# Patient Record
Sex: MALE | Race: WHITE | Employment: OTHER | URBAN - METROPOLITAN AREA
[De-identification: names, ages, dates, MRNs, and addresses within clinical notes are randomized per-mention and may not be internally consistent; named-entity substitution may affect disease eponyms.]

---

## 2016-05-04 PROBLEM — E11.3391: Noted: 2018-05-24

## 2017-03-14 RX ORDER — TAMSULOSIN HYDROCHLORIDE 0.4 MG/1
CAPSULE ORAL
Qty: 30 CAP | Refills: 3 | Status: SHIPPED | OUTPATIENT
Start: 2017-03-14 | End: 2017-07-15 | Stop reason: SDUPTHER

## 2017-04-26 ENCOUNTER — HOSPITAL ENCOUNTER (OUTPATIENT)
Dept: LAB | Age: 67
Discharge: HOME | End: 2017-04-26
Payer: COMMERCIAL

## 2017-04-26 ENCOUNTER — OFFICE VISIT (OUTPATIENT)
Dept: UROLOGY | Age: 67
End: 2017-04-26

## 2017-04-26 VITALS — BODY MASS INDEX: 29.2 KG/M2 | HEIGHT: 70 IN | WEIGHT: 204 LBS

## 2017-04-26 DIAGNOSIS — N40.1 BPH ASSOCIATED WITH NOCTURIA: ICD-10-CM

## 2017-04-26 DIAGNOSIS — R35.1 BPH ASSOCIATED WITH NOCTURIA: ICD-10-CM

## 2017-04-26 DIAGNOSIS — N40.0 BENIGN PROSTATIC HYPERPLASIA, PRESENCE OF LOWER URINARY TRACT SYMPTOMS UNSPECIFIED, UNSPECIFIED MORPHOLOGY: Primary | ICD-10-CM

## 2017-04-26 LAB
BILIRUB UR QL STRIP: NEGATIVE
GLUCOSE UR-MCNC: NEGATIVE MG/DL
KETONES P FAST UR STRIP-MCNC: NEGATIVE MG/DL
PH UR STRIP: 5.5 [PH] (ref 4.6–8)
PROT UR QL STRIP: NEGATIVE MG/DL
PSA SERPL-MCNC: 0.8 NG/ML (ref 0–4)
SP GR UR STRIP: 1.02 (ref 1–1.03)
UA UROBILINOGEN AMB POC: NORMAL (ref 0.2–1)
URINALYSIS CLARITY POC: CLEAR
URINALYSIS COLOR POC: YELLOW
URINE BLOOD POC: NEGATIVE
URINE LEUKOCYTES POC: NORMAL
URINE NITRITES POC: NEGATIVE

## 2017-04-26 PROCEDURE — 84153 ASSAY OF PSA TOTAL: CPT | Performed by: UROLOGY

## 2017-04-26 RX ORDER — LORATADINE 10 MG/1
10 TABLET ORAL
COMMUNITY

## 2017-04-26 RX ORDER — NAPROXEN SODIUM 220 MG
220 TABLET ORAL 2 TIMES DAILY WITH MEALS
COMMUNITY
End: 2017-05-22

## 2017-04-26 NOTE — PROGRESS NOTES
RBV. Per Dr. Frances Loera lab drawn in office today for PSA for BPH. Mr. Reginal Sacks has a reminder for a \"due or due soon\" health maintenance. I have asked that he contact his primary care provider for follow-up on this health maintenance.

## 2017-04-26 NOTE — PROGRESS NOTES
Mr. Daysi Samano has a reminder for a \"due or due soon\" health maintenance. I have asked that he contact his primary care provider for follow-up on this health maintenance.

## 2017-04-26 NOTE — MR AVS SNAPSHOT
Visit Information Date & Time Provider Department Dept. Phone Encounter #  
 4/26/2017 10:45 AM Narciso Bartholomew, [address] [facility] [id number] Upcoming Health Maintenance Date Due Hepatitis C Screening 1950 DTaP/Tdap/Td series (1 - Tdap) 6/3/1972 ZOSTER VACCINE AGE 60> 6/3/2011 MEDICARE YEARLY EXAM 6/3/2016 EYE EXAM RETINAL OR DILATED Q1 5/4/2017 HEMOGLOBIN A1C Q6M 5/16/2017 MICROALBUMIN Q1 7/6/2017 Pneumococcal 65+ Low/Medium Risk (2 of 2 - PPSV23) 11/16/2017 FOOT EXAM Q1 11/16/2017 LIPID PANEL Q1 11/16/2017 GLAUCOMA SCREENING Q2Y 5/4/2018 COLONOSCOPY 7/1/2023 Allergies as of 4/26/2017  Review Complete On: 4/26/2017 By: William Jacob LPN Severity Noted Reaction Type Reactions Neurontin [Gabapentin]  08/18/2015    Other (comments)  
 confusion Topamax [Topiramate]  08/18/2015    Other (comments)  
 confusion Current Immunizations  Reviewed on 11/17/2015 Name Date Influenza High Dose Vaccine PF 10/30/2016 Influenza Vaccine PF 11/17/2015 Pneumococcal Conjugate (PCV-13) 11/16/2016 Not reviewed this visit You Were Diagnosed With   
  
 Codes Comments Benign prostatic hyperplasia, presence of lower urinary tract symptoms unspecified, unspecified morphology    -  Primary ICD-10-CM: N40.0 ICD-9-CM: 600.00   
 BPH associated with nocturia     ICD-10-CM: N40.1, R35.1 ICD-9-CM: 600.01, 788.43 Vitals Height(growth percentile) Weight(growth percentile) BMI Smoking Status 5' 10\" (1.778 m) 204 lb (92.5 kg) 29.27 kg/m2 Former Smoker Vitals History BMI and BSA Data Body Mass Index Body Surface Area  
 29.27 kg/m 2 2.14 m 2 Preferred Pharmacy Pharmacy Name Phone [id number], [address] [facility] [phone] Your Updated Medication List  
  
   
 This list is accurate as of: 4/26/17 11:52 AM.  Always use your most recent med list. ALLEGRA 180 mg tablet Generic drug:  fexofenadine Take  by mouth daily. atorvastatin 40 mg tablet Commonly known as:  LIPITOR Take 1 Tab by mouth daily. CENTRUM SILVER PO Take  by mouth.  
  
 citalopram 20 mg tablet Commonly known as:  Sol Jayden Take 1 Tab by mouth daily. esomeprazole 40 mg capsule Commonly known as:  Lorrie Cull Take 1 Cap by mouth daily. fenofibrate nanocrystallized 145 mg tablet Commonly known as:  TRICOR  
1 qd  
  
 finasteride 5 mg tablet Commonly known as:  PROSCAR  
take 1 tablet by mouth once daily FLEXERIL 10 mg tablet Generic drug:  cyclobenzaprine Take  by mouth three (3) times daily as needed for Muscle Spasm(s). gabapentin 600 mg tablet Commonly known as:  NEURONTIN Take 1 Tab by mouth three (3) times daily. HYDROcodone-acetaminophen  mg tablet Commonly known as:  Mukesh Gallon Take 1 Tab by mouth.  
  
 loratadine 10 mg tablet Commonly known as:  Manual Me Take 10 mg by mouth. naproxen sodium 220 mg tablet Commonly known as:  NAPROSYN Take 220 mg by mouth two (2) times daily (with meals). pioglitazone 30 mg tablet Commonly known as:  ACTOS  
1 qd  
  
 quinapril 20 mg tablet Commonly known as:  ACCUPRIL Take 1 Tab by mouth nightly. tamsulosin 0.4 mg capsule Commonly known as:  FLOMAX  
take 1 capsule by mouth once daily  
  
 zolpidem CR 12.5 mg tablet Commonly known as:  AMBIEN CR Take 12.5 mg by mouth nightly as needed for Sleep. We Performed the Following AMB POC URINALYSIS DIP STICK AUTO W/O MICRO [81560 CPT(R)] [place] COLLECTION VENOUS BLOOD,VENIPUNCTURE B7032574 CPT(R)] To-Do List   
 04/26/2017 Lab:  PROSTATE SPECIFIC AG (PSA) Patient Instructions Benign Prostatic Hyperplasia: Care Instructions Your Care Instructions Benign prostatic hyperplasia, or BPH, is an enlarged prostate gland. The prostate is a small gland that makes some of the fluid in semen. Prostate enlargement happens to almost all men as they age. It is usually not serious. BPH does not cause prostate cancer. As the prostate gets bigger, it may partly block the flow of urine. You may have a hard time getting a urine stream started or completely stopped. BPH can cause dribbling. You may have a weak urine stream, or you may have to urinate more often than you used to, especially at night. Most men find these problems easy to manage. You do not need treatment unless your symptoms bother you a lot or you have other problems, such as bladder infections or stones. In these cases, medicines may help. Surgery is not needed unless the urine flow is blocked or the symptoms do not get better with medicine. Follow-up care is a key part of your treatment and safety. Be sure to make and go to all appointments, and call your doctor if you are having problems. It's also a good idea to know your test results and keep a list of the medicines you take. How can you care for yourself at home? · Take plenty of time to urinate. Try to relax. · Try \"double voiding. \" Urinate as much you can, relax for a few moments, and then try to urinate again. · Sit on the toilet to urinate. · Read or think of other things while you are waiting. · Turn on a faucet, or try to picture running water. Some men find that this helps get their urine flowing. · If dribbling is a problem, wash your penis daily to avoid skin irritation and infection. · Avoid caffeine and alcohol. These drinks will increase how often you need to urinate. Spread your fluid intake throughout the day. If the urge to urinate often wakes you at night, limit your fluid intake in the evening. Urinate right before you go to bed.  
· Many over-the-counter cold and allergy medicines can make the symptoms of BPH worse. Avoid antihistamines, decongestants, and allergy pills, if you can. Read the warnings on the package. · If you take any prescription medicines, especially tranquilizers or antidepressants, ask your doctor or pharmacist whether they can cause urination problems. There may be other medicines you can use that do not cause urinary problems. · Be safe with medicines. Take your medicines exactly as prescribed. Call your doctor if you think you are having a problem with your medicine. When should you call for help? Call your doctor now or seek immediate medical care if: 
· You cannot urinate at all. · You have symptoms of a urinary infection. For example: ¨ You have blood or pus in your urine. ¨ You have pain in your back just below your rib cage. This is called flank pain. ¨ You have a fever, chills, or body aches. ¨ It hurts to urinate. ¨ You have groin or belly pain. Watch closely for changes in your health, and be sure to contact your doctor if: 
· It hurts when you ejaculate. · Your urinary problems get a lot worse or bother you a lot. Where can you learn more? Go to http://jhonatan-mayda.info/. Enter S135 in the search box to learn more about \"Benign Prostatic Hyperplasia: Care Instructions. \" Current as of: May 24, 2016 Content Version: 11.2 © 9430-9009 [employer]. Care instructions adapted under license by [employer] (which disclaims liability or warranty for this information). If you have questions about a medical condition or this instruction, always ask your healthcare professional. [place] [address] any warranty or liability for your use of this information. Introducing [facility]! Rianna Sheehan introduces [employer] patient portal. Now you can access parts of your medical record, email your doctor's office, and request medication refills online.    
 
1. In your internet browser, go to [email or web address] 2. Click on the First Time User? Click Here link in the Sign In box. You will see the New Member Sign Up page. 3. Enter your [facility] Access Code exactly as it appears below. You will not need to use this code after youve completed the sign-up process. If you do not sign up before the expiration date, you must request a new code. · [facility] Access Code: [email or web address] Expires: 7/25/2017 10:48 AM 
 
4. Enter the last four digits of your Social Security Number (xxxx) and Date of Birth (mm/dd/yyyy) as indicated and click Submit. You will be taken to the next sign-up page. 5. Create a [facility] ID. This will be your [facility] login ID and cannot be changed, so think of one that is secure and easy to remember. 6. Create a [facility] password. You can change your password at any time. 7. Enter your Password Reset Question and Answer. This can be used at a later time if you forget your password. 8. Enter your e-mail address. You will receive e-mail notification when new information is available in [address] 9. Click Sign Up. You can now view and download portions of your medical record. 10. Click the Download Summary menu link to download a portable copy of your medical information. If you have questions, please visit the Frequently Asked Questions section of the [facility] website. Remember, [facility] is NOT to be used for urgent needs. For medical emergencies, dial 911. Now available from your iPhone and Android! Please provide this summary of care documentation to your next provider. Your primary care clinician is listed as Raymond Alexis. If you have any questions after today's visit, please call [phone].

## 2017-04-26 NOTE — PATIENT INSTRUCTIONS

## 2017-04-27 NOTE — PROGRESS NOTES
Ro Zelaya  73 y.o. male     Mr. Ann-Marie Simons seen today for follow-up symptomatic BPH  Patient is responding favorably to treatment with alpha-blocker Flomax and 5 alpha reductase inhibitor Finasteride  Patient is voiding with a forceful stream good control no daytime urgency frequency and no episodes of nocturia since beginning treatment with these medications      PSA 1.1 and August 2013  PSA 1.1 in August 2014  PSA 0.9 in September 2015  PSA 0.8 in October 2016      Testosterone 245    PVR 59 cc in October 2016   cc in April 2017      Review of Systems:    CNS: No headaches seizures syncope or visual changes  Respiratory: No lesion or shortness of breath  Cardiovascular:Hypertension  Intestinal:GERD no change in bowel habits  Urinary: Symptomatic BPH  Skeletal: no bone or joint pain  Endocrine:Large joint arthritisNo diabetes  Other:       Allergies: Allergies   Allergen Reactions    Neurontin [Gabapentin] Other (comments)     confusion    Topamax [Topiramate] Other (comments)     confusion      Medications:    Current Outpatient Prescriptions   Medication Sig Dispense Refill    naproxen sodium (NAPROSYN) 220 mg tablet Take 220 mg by mouth two (2) times daily (with meals).  loratadine (CLARITIN) 10 mg tablet Take 10 mg by mouth.  tamsulosin (FLOMAX) 0.4 mg capsule take 1 capsule by mouth once daily 30 Cap 3    finasteride (PROSCAR) 5 mg tablet take 1 tablet by mouth once daily 90 Tab 3    quinapril (ACCUPRIL) 20 mg tablet Take 1 Tab by mouth nightly. 90 Tab 4    fenofibrate nanocrystallized (TRICOR) 145 mg tablet 1 qd 90 Tab 4    citalopram (CELEXA) 20 mg tablet Take 1 Tab by mouth daily. 90 Tab 3    esomeprazole (NEXIUM) 40 mg capsule Take 1 Cap by mouth daily. 90 Cap 3    FOLIC ACID/MULTIVIT-MIN/LUTEIN (CENTRUM SILVER PO) Take  by mouth.  cyclobenzaprine (FLEXERIL) 10 mg tablet Take  by mouth three (3) times daily as needed for Muscle Spasm(s).       atorvastatin (LIPITOR) 40 mg tablet Take 1 Tab by mouth daily. 90 Tab 3    zolpidem CR (AMBIEN CR) 12.5 mg tablet Take 12.5 mg by mouth nightly as needed for Sleep.  hydrocodone-acetaminophen (LORTAB)  mg per tablet Take 1 Tab by mouth.  gabapentin (NEURONTIN) 600 mg tablet Take 1 Tab by mouth three (3) times daily. 90 Tab 1    pioglitazone (ACTOS) 30 mg tablet 1 qd 90 Tab 3    fexofenadine (ALLEGRA) 180 mg tablet Take  by mouth daily.          Past Medical History:   Diagnosis Date    Bilateral shoulder pain     Chronic airway obstruction, not elsewhere classified     Depressive disorder, not elsewhere classified     Esophagitis, unspecified     Generalized osteoarthrosis, involving multiple sites     GERD (gastroesophageal reflux disease)     Headache     Hematuria     neg cysto    Hypercholesterolemia     Hypercholesterolemia     Hypertension     Mixed hyperlipidemia     Neck pain     Pancreatitis 6/2014    Rheumatic fever     w/o sequelae    Type II or unspecified type diabetes mellitus without mention of complication, not stated as uncontrolled     Unspecified hyperplasia of prostate with urinary obstruction and other lower urinary tract symptoms (LUTS)       Past Surgical History:   Procedure Laterality Date    HX APPENDECTOMY      HX COLONOSCOPY  7/26/2013    diverticulosis    HX OPEN REDUCTION INTERNAL FIXATION Right     clavicle fx     Family History   Problem Relation Age of Onset    Cancer Father     Cancer Other     Heart Disease Maternal Grandmother     Heart Disease Paternal Grandfather     Other Sister      brain hemorrhage        Physical Examination: Well-nourished mature male in no apparent distress neck:    Prostate by HERMAN is large rounded smooth benign consistency and nontender-no induration no nodularity  No rectal masses induration or tenderness      Negative dipstick/nitrite negative urinalysis:     PVR today 109 cc    Impression: Symptomatic BPH responding favorably to alpha-blocker and 5 alpha reductase inhibitor                       -Low-level urinary retention        Plan: Flomax 0.4 mg daily #90 refill ×3           Finasteride 5 mg daily #90 refill ×3    PSA today    Discussed indications for TURP today increasing PVR raises concern for obstructive prostatism not appreciated symptomatically      rtc 6 mo PVR    More than 1/2 of this 25 minute visit was spent in counselling and coordination of care, as described above. Cameron Maddox MD  -electronically signed-    PLEASE NOTE:  This document has been produced using voice recognition software. Unrecognized errors in transcription may be present.

## 2017-05-22 ENCOUNTER — HOSPITAL ENCOUNTER (OUTPATIENT)
Dept: LAB | Age: 67
Discharge: HOME | End: 2017-05-22
Payer: COMMERCIAL

## 2017-05-22 ENCOUNTER — OFFICE VISIT (OUTPATIENT)
Dept: INTERNAL MEDICINE CLINIC | Age: 67
End: 2017-05-22

## 2017-05-22 VITALS
WEIGHT: 203 LBS | BODY MASS INDEX: 29.06 KG/M2 | RESPIRATION RATE: 16 BRPM | HEART RATE: 66 BPM | SYSTOLIC BLOOD PRESSURE: 128 MMHG | OXYGEN SATURATION: 99 % | DIASTOLIC BLOOD PRESSURE: 80 MMHG | TEMPERATURE: 97.7 F | HEIGHT: 70 IN

## 2017-05-22 DIAGNOSIS — E11.9 TYPE 2 DIABETES MELLITUS WITHOUT COMPLICATION, WITHOUT LONG-TERM CURRENT USE OF INSULIN (HCC): ICD-10-CM

## 2017-05-22 DIAGNOSIS — E11.9 TYPE 2 DIABETES MELLITUS WITHOUT COMPLICATION, WITHOUT LONG-TERM CURRENT USE OF INSULIN (HCC): Primary | ICD-10-CM

## 2017-05-22 DIAGNOSIS — I10 ESSENTIAL HYPERTENSION, BENIGN: ICD-10-CM

## 2017-05-22 DIAGNOSIS — E78.2 MIXED HYPERLIPIDEMIA: ICD-10-CM

## 2017-05-22 LAB
ALT SERPL-CCNC: 41 U/L (ref 16–61)
ANION GAP BLD CALC-SCNC: 6 MMOL/L (ref 3–18)
AST SERPL W P-5'-P-CCNC: 34 U/L (ref 15–37)
BUN SERPL-MCNC: 24 MG/DL (ref 7–18)
BUN/CREAT SERPL: 16 (ref 12–20)
CALCIUM SERPL-MCNC: 9.4 MG/DL (ref 8.5–10.1)
CHLORIDE SERPL-SCNC: 108 MMOL/L (ref 100–108)
CHOLEST SERPL-MCNC: 149 MG/DL
CO2 SERPL-SCNC: 29 MMOL/L (ref 21–32)
CREAT SERPL-MCNC: 1.47 MG/DL (ref 0.6–1.3)
EST. AVERAGE GLUCOSE BLD GHB EST-MCNC: 126 MG/DL
GLUCOSE SERPL-MCNC: 106 MG/DL (ref 74–99)
HBA1C MFR BLD: 6 % (ref 4.2–5.6)
HDLC SERPL-MCNC: 27 MG/DL (ref 40–60)
HDLC SERPL: 5.5 {RATIO} (ref 0–5)
LDLC SERPL CALC-MCNC: 79.6 MG/DL (ref 0–100)
LIPID PROFILE,FLP: ABNORMAL
POTASSIUM SERPL-SCNC: 5.1 MMOL/L (ref 3.5–5.5)
SODIUM SERPL-SCNC: 143 MMOL/L (ref 136–145)
TRIGL SERPL-MCNC: 212 MG/DL (ref ?–150)
VLDLC SERPL CALC-MCNC: 42.4 MG/DL

## 2017-05-22 PROCEDURE — 84450 TRANSFERASE (AST) (SGOT): CPT | Performed by: INTERNAL MEDICINE

## 2017-05-22 PROCEDURE — 83036 HEMOGLOBIN GLYCOSYLATED A1C: CPT | Performed by: INTERNAL MEDICINE

## 2017-05-22 PROCEDURE — 80061 LIPID PANEL: CPT | Performed by: INTERNAL MEDICINE

## 2017-05-22 PROCEDURE — 84460 ALANINE AMINO (ALT) (SGPT): CPT | Performed by: INTERNAL MEDICINE

## 2017-05-22 PROCEDURE — 80048 BASIC METABOLIC PNL TOTAL CA: CPT | Performed by: INTERNAL MEDICINE

## 2017-05-22 RX ORDER — NAPROXEN SODIUM 220 MG
220 TABLET ORAL
COMMUNITY

## 2017-05-22 NOTE — MR AVS SNAPSHOT
Visit Information Date & Time Provider Department Dept. Phone Encounter #  
 5/22/2017 11:30 AM Rai Camara MD [facility] OF Whitney Lozano [phone] [id number] Follow-up Instructions Return if symptoms worsen or fail to improve. Follow-up and Disposition History Your Appointments 10/26/2017 10:45 AM  
ULTRASOUND with Perry Dempsey MD  
[facility]: P[facility]  
 [address] Rajesh A [address] [address]  
[phone] [address] [facility] [address] Upcoming Health Maintenance Date Due Hepatitis C Screening 1950 DTaP/Tdap/Td series (1 - Tdap) 6/3/1972 ZOSTER VACCINE AGE 60> 6/3/2011 MEDICARE YEARLY EXAM 6/3/2016 EYE EXAM RETINAL OR DILATED Q1 5/4/2017 HEMOGLOBIN A1C Q6M 5/16/2017 MICROALBUMIN Q1 7/6/2017 INFLUENZA AGE 9 TO ADULT 8/1/2017 Pneumococcal 65+ Low/Medium Risk (2 of 2 - PPSV23) 11/16/2017 FOOT EXAM Q1 11/16/2017 LIPID PANEL Q1 11/16/2017 GLAUCOMA SCREENING Q2Y 5/4/2018 COLONOSCOPY 7/1/2023 Allergies as of 5/22/2017  Review Complete On: 5/22/2017 By: Rai Camara MD  
  
 Severity Noted Reaction Type Reactions Neurontin [Gabapentin]  08/18/2015    Other (comments)  
 confusion Topamax [Topiramate]  08/18/2015    Other (comments)  
 confusion Current Immunizations  Reviewed on 11/17/2015 Name Date Influenza High Dose Vaccine PF 10/30/2016 Influenza Vaccine PF 11/17/2015 Pneumococcal Conjugate (PCV-13) 11/16/2016 Not reviewed this visit You Were Diagnosed With   
  
 Codes Comments Type 2 diabetes mellitus without complication, without long-term current use of insulin (HCC)    -  Primary ICD-10-CM: E11.9 ICD-9-CM: 250.00 Essential hypertension, benign     ICD-10-CM: I10 
ICD-9-CM: 401.1 Mixed hyperlipidemia     ICD-10-CM: E78.2 ICD-9-CM: 272.2 Vitals BP Pulse Temp Resp Height(growth percentile) 128/80 (BP 1 Location: Left arm, BP Patient Position: Sitting) 66 97.7 °F (36.5 °C) (Tympanic) 16 5' 10\" (1.778 m) Weight(growth percentile) SpO2 BMI Smoking Status 203 lb (92.1 kg) 99% 29.13 kg/m2 Former Smoker BMI and BSA Data Body Mass Index Body Surface Area  
 29.13 kg/m 2 2.13 m 2 Preferred Pharmacy Pharmacy Name Phone [id number], [address] [facility] [phone] Your Updated Medication List  
  
   
This list is accurate as of: 5/22/17 11:44 AM.  Always use your most recent med list.  
  
  
  
  
 ALEVE 220 mg tablet Generic drug:  naproxen sodium Take 220 mg by mouth two (2) times daily as needed. atorvastatin 40 mg tablet Commonly known as:  LIPITOR Take 1 Tab by mouth daily. CENTRUM SILVER PO Take  by mouth.  
  
 citalopram 20 mg tablet Commonly known as:  Rozena Loud Take 1 Tab by mouth daily. esomeprazole 40 mg capsule Commonly known as:  Larey Meo Take 1 Cap by mouth daily. fenofibrate nanocrystallized 145 mg tablet Commonly known as:  TRICOR  
1 qd  
  
 finasteride 5 mg tablet Commonly known as:  PROSCAR  
take 1 tablet by mouth once daily FLEXERIL 10 mg tablet Generic drug:  cyclobenzaprine Take  by mouth three (3) times daily as needed for Muscle Spasm(s). HYDROcodone-acetaminophen  mg tablet Commonly known as:  Ceil Heap Take 1 Tab by mouth.  
  
 loratadine 10 mg tablet Commonly known as:  Silverio Susan Take 10 mg by mouth daily as needed. pioglitazone 30 mg tablet Commonly known as:  ACTOS  
1 qd  
  
 quinapril 20 mg tablet Commonly known as:  ACCUPRIL Take 1 Tab by mouth nightly. tamsulosin 0.4 mg capsule Commonly known as:  FLOMAX  
take 1 capsule by mouth once daily  
  
 zolpidem CR 12.5 mg tablet Commonly known as:  AMBIEN CR  
 Take 12.5 mg by mouth nightly as needed for Sleep. Follow-up Instructions Return if symptoms worsen or fail to improve. Patient Instructions Learning About Meal Planning for Diabetes Why plan your meals? Meal planning can be a key part of managing diabetes. Planning meals and snacks with the right balance of carbohydrate, protein, and fat can help you keep your blood sugar at the target level you set with your doctor. You don't have to eat special foods. You can eat what your family eats, including sweets once in a while. But you do have to pay attention to how often you eat and how much you eat of certain foods. You may want to work with a dietitian or a certified diabetes educator. He or she can give you tips and meal ideas and can answer your questions about meal planning. This health professional can also help you reach a healthy weight if that is one of your goals. What plan is right for you? Your dietitian or diabetes educator may suggest that you start with the plate format or carbohydrate counting. The plate format The plate format is a simple way to help you manage how you eat. You plan meals by learning how much space each food should take on a plate. Using the plate format helps you spread carbohydrate throughout the day. It can make it easier to keep your blood sugar level within your target range. It also helps you see if you're eating healthy portion sizes. To use the plate format, you put non-starchy vegetables on half your plate. Add meat or meat substitutes on one-quarter of the plate. Put a grain or starchy vegetable (such as brown rice or a potato) on the final quarter of the plate. You can add a small piece of fruit and some low-fat or fat-free milk or yogurt, depending on your carbohydrate goal for each meal. 
Here are some tips for using the plate format: · Make sure that you are not using an oversized plate.  A 9-inch plate is best. Many restaurants use larger plates. · Get used to using the plate format at home. Then you can use it when you eat out. · Write down your questions about using the plate format. Talk to your doctor, a dietitian, or a diabetes educator about your concerns. Carbohydrate counting With carbohydrate counting, you plan meals based on the amount of carbohydrate in each food. Carbohydrate raises blood sugar higher and more quickly than any other nutrient. It is found in desserts, breads and cereals, and fruit. It's also found in starchy vegetables such as potatoes and corn, grains such as rice and pasta, and milk and yogurt. Spreading carbohydrate throughout the day helps keep your blood sugar levels within your target range. Your daily amount depends on several things, including your weight, how active you are, which diabetes medicines you take, and what your goals are for your blood sugar levels. A registered dietitian or diabetes educator can help you plan how much carbohydrate to include in each meal and snack. A guideline for your daily amount of carbohydrate is: · 45 to 60 grams at each meal. That's about the same as 3 to 4 carbohydrate servings. · 15 to 20 grams at each snack. That's about the same as 1 carbohydrate serving. The Nutrition Facts label on packaged foods tells you how much carbohydrate is in a serving of the food. First, look at the serving size on the food label. Is that the amount you eat in a serving? All of the nutrition information on a food label is based on that serving size. So if you eat more or less than that, you'll need to adjust the other numbers. Total carbohydrate is the next thing you need to look for on the label. If you count carbohydrate servings, one serving of carbohydrate is 15 grams. For foods that don't come with labels, such as fresh fruits and vegetables, you'll need a guide that lists carbohydrate in these foods. Ask your doctor, dietitian, or diabetes educator about books or other nutrition guides you can use. If you take insulin, you need to know how many grams of carbohydrate are in a meal. This lets you know how much rapid-acting insulin to take before you eat. If you use an insulin pump, you get a constant rate of insulin during the day. So the pump must be programmed at meals to give you extra insulin to cover the rise in blood sugar after meals. When you know how much carbohydrate you will eat, you can take the right amount of insulin. Or, if you always use the same amount of insulin, you need to make sure that you eat the same amount of carbohydrate at meals. If you need more help to understand carbohydrate counting and food labels, ask your doctor, dietitian, or diabetes educator. How do you get started with meal planning? Here are some tips to get started: 
· Plan your meals a week at a time. Don't forget to include snacks too. · Use cookbooks or online recipes to plan several main meals. Plan some quick meals for busy nights. You also can double some recipes that freeze well. Then you can save half for other busy nights when you don't have time to cook. · Make sure you have the ingredients you need for your recipes. If you're running low on basic items, put these items on your shopping list too. · List foods that you use to make breakfasts, lunches, and snacks. List plenty of fruits and vegetables. · Post this list on the refrigerator. Add to it as you think of more things you need. · Take the list to the store to do your weekly shopping. Follow-up care is a key part of your treatment and safety. Be sure to make and go to all appointments, and call your doctor if you are having problems. It's also a good idea to know your test results and keep a list of the medicines you take. Where can you learn more? Go to http://jhonatan-mayda.info/. Manolo Mayen in the search box to learn more about \"Learning About Meal Planning for Diabetes. \" Current as of: October 26, 2016 Content Version: 11.2 © 5336-5568 [employer]. Care instructions adapted under license by [employer] (which disclaims liability or warranty for this information). If you have questions about a medical condition or this instruction, always ask your healthcare professional. [address] any warranty or liability for your use of this information. Introducing [facility]! Martínez Monsalve introduces [employer] patient portal. Now you can access parts of your medical record, email your doctor's office, and request medication refills online. 1. In your internet browser, go to [email or web address] 2. Click on the First Time User? Click Here link in the Sign In box. You will see the New Member Sign Up page. 3. Enter your [employer] Access Code exactly as it appears below. You will not need to use this code after youve completed the sign-up process. If you do not sign up before the expiration date, you must request a new code. · [employer] Access Code: [email or web address] Expires: 7/25/2017 10:48 AM 
 
4. Enter the last four digits of your Social Security Number (xxxx) and Date of Birth (mm/dd/yyyy) as indicated and click Submit. You will be taken to the next sign-up page. 5. Create a [employer] ID. This will be your [employer] login ID and cannot be changed, so think of one that is secure and easy to remember. 6. Create a [employer] password. You can change your password at any time. 7. Enter your Password Reset Question and Answer. This can be used at a later time if you forget your password. 8. Enter your e-mail address. You will receive e-mail notification when new information is available in [address] 9. Click Sign Up. You can now view and download portions of your medical record. 10. Click the Download Summary menu link to download a portable copy of your medical information. If you have questions, please visit the Frequently Asked Questions section of the [employer] website. Remember, [employer] is NOT to be used for urgent needs. For medical emergencies, dial 911. Now available from your iPhone and Android! Please provide this summary of care documentation to your next provider. Your primary care clinician is listed as Teddy Rinne. If you have any questions after today's visit, please call [phone].

## 2017-05-22 NOTE — PROGRESS NOTES
HPI:   F/u visit for routine evaluation of HTN, T2DM, hyperlipidemia  A1C/chol 6/147 in Nov 2016  Pt's depressive sxs mild on rx  w/o chest pain/abd. discomfort; no dyspnea, cough or pedal edema; denies constitutional complaints of fever, night sweats or wt loss; no evidence of GI/ hemorrhage; no polyuria/polydipsia. Activity is age appropriate despite chronic pain    ROS is otherwise negative. Past Medical History:   Diagnosis Date    Bilateral shoulder pain     Chronic airway obstruction, not elsewhere classified     Depressive disorder, not elsewhere classified     Esophagitis, unspecified     Generalized osteoarthrosis, involving multiple sites     GERD (gastroesophageal reflux disease)     Headache     Hematuria     neg cysto    Hypercholesterolemia     Hypercholesterolemia     Hypertension     Mixed hyperlipidemia     Neck pain     Pancreatitis 6/2014    Rheumatic fever     w/o sequelae    Type II or unspecified type diabetes mellitus without mention of complication, not stated as uncontrolled     Unspecified hyperplasia of prostate with urinary obstruction and other lower urinary tract symptoms (LUTS)        Past Surgical History:   Procedure Laterality Date    HX APPENDECTOMY      HX COLONOSCOPY  7/26/2013    diverticulosis    HX OPEN REDUCTION INTERNAL FIXATION Right     clavicle fx       Social History     Social History    Marital status:      Spouse name: N/A    Number of children: N/A    Years of education: N/A     Occupational History    Not on file.      Social History Main Topics    Smoking status: Former Smoker     Packs/day: 1.00     Quit date: 4/10/2011    Smokeless tobacco: Former User    Alcohol use No      Comment: a few beers    Drug use: No    Sexual activity: Not on file     Other Topics Concern    Not on file     Social History Narrative       Allergies   Allergen Reactions    Neurontin [Gabapentin] Other (comments)     confusion    Topamax [Topiramate] Other (comments)     confusion       Family History   Problem Relation Age of Onset    Cancer Father     Cancer Other     Heart Disease Maternal Grandmother     Heart Disease Paternal Grandfather     Other Sister      brain hemorrhage       Current Outpatient Prescriptions   Medication Sig Dispense Refill    naproxen sodium (ALEVE) 220 mg tablet Take 220 mg by mouth two (2) times daily as needed.  loratadine (CLARITIN) 10 mg tablet Take 10 mg by mouth daily as needed.  tamsulosin (FLOMAX) 0.4 mg capsule take 1 capsule by mouth once daily 30 Cap 3    finasteride (PROSCAR) 5 mg tablet take 1 tablet by mouth once daily 90 Tab 3    quinapril (ACCUPRIL) 20 mg tablet Take 1 Tab by mouth nightly. 90 Tab 4    fenofibrate nanocrystallized (TRICOR) 145 mg tablet 1 qd 90 Tab 4    citalopram (CELEXA) 20 mg tablet Take 1 Tab by mouth daily. 90 Tab 3    esomeprazole (NEXIUM) 40 mg capsule Take 1 Cap by mouth daily. 90 Cap 3    FOLIC ACID/MULTIVIT-MIN/LUTEIN (CENTRUM SILVER PO) Take  by mouth.  cyclobenzaprine (FLEXERIL) 10 mg tablet Take  by mouth three (3) times daily as needed for Muscle Spasm(s).  pioglitazone (ACTOS) 30 mg tablet 1 qd 90 Tab 3    atorvastatin (LIPITOR) 40 mg tablet Take 1 Tab by mouth daily. 90 Tab 3    zolpidem CR (AMBIEN CR) 12.5 mg tablet Take 12.5 mg by mouth nightly as needed for Sleep.  hydrocodone-acetaminophen (LORTAB)  mg per tablet Take 1 Tab by mouth. Visit Vitals    /80 (BP 1 Location: Left arm, BP Patient Position: Sitting)    Pulse 66    Temp 97.7 °F (36.5 °C) (Tympanic)    Resp 16    Ht 5' 10\" (1.778 m)    Wt 203 lb (92.1 kg)    SpO2 99%    BMI 29.13 kg/m2       PE  Well nourished in NAD  HEENT:  OP: clear. Neck: supple w/o mass or bruits. Chest: clear. CV: RRR w/o m,r,g; pulses intact. Abd: soft, NT, w/o HSM or mass. Ext: w/o edema. Neuro: NF. Assessment and Plan    Encounter Diagnoses   Name Primary?  Type 2 diabetes mellitus without complication, without long-term current use of insulin (HCC) Yes    Essential hypertension, benign     Mixed hyperlipidemia      BP @ goal  Labs to assess metabolic parameters (C2KM, hyperlipidemia)  Up to date with eye exams  I have reviewed/discussed the above normal BMI with the patient. I have recommended the following interventions: dietary management education, guidance, and counseling . Anna Ramirez     Depression - mild/stable on rx  No change in rx  OV 4-6 mos or prn  I have explained plan to patient and the patient verbalizes understanding

## 2017-05-22 NOTE — PROGRESS NOTES
1. Have you been to the ER, urgent care clinic since your last visit? Hospitalized since your last visit? No    2. Have you seen or consulted any other health care providers outside of the [address] [place] [address] since your last visit? Include any pap smears or colon screening.  No

## 2017-05-22 NOTE — PATIENT INSTRUCTIONS

## 2017-07-17 RX ORDER — TAMSULOSIN HYDROCHLORIDE 0.4 MG/1
CAPSULE ORAL
Qty: 30 CAP | Refills: 3 | Status: SHIPPED | OUTPATIENT
Start: 2017-07-17 | End: 2017-11-18 | Stop reason: SDUPTHER

## 2017-08-01 RX ORDER — FENOFIBRATE 145 MG/1
TABLET, COATED ORAL
Qty: 90 TAB | Refills: 4 | Status: SHIPPED | OUTPATIENT
Start: 2017-08-01 | End: 2018-08-12 | Stop reason: SDUPTHER

## 2017-08-01 RX ORDER — QUINAPRIL 20 MG/1
20 TABLET ORAL
Qty: 90 TAB | Refills: 4 | Status: SHIPPED | OUTPATIENT
Start: 2017-08-01 | End: 2018-07-16 | Stop reason: SDUPTHER

## 2017-08-01 NOTE — TELEPHONE ENCOUNTER
Requested Prescriptions     Pending Prescriptions Disp Refills    quinapril (ACCUPRIL) 20 mg tablet 90 Tab 4     Sig: Take 1 Tab by mouth nightly.     fenofibrate nanocrystallized (TRICOR) 145 mg tablet 90 Tab 4     Si qd

## 2017-10-03 RX ORDER — FINASTERIDE 5 MG/1
TABLET, FILM COATED ORAL
Qty: 90 TAB | Refills: 3 | Status: SHIPPED | OUTPATIENT
Start: 2017-10-03 | End: 2018-09-30 | Stop reason: SDUPTHER

## 2017-10-19 ENCOUNTER — TELEPHONE (OUTPATIENT)
Dept: INTERNAL MEDICINE CLINIC | Age: 67
End: 2017-10-19

## 2017-10-26 ENCOUNTER — OFFICE VISIT (OUTPATIENT)
Dept: UROLOGY | Age: 67
End: 2017-10-26

## 2017-10-26 VITALS
HEIGHT: 70 IN | TEMPERATURE: 97.4 F | WEIGHT: 205 LBS | DIASTOLIC BLOOD PRESSURE: 75 MMHG | HEART RATE: 65 BPM | OXYGEN SATURATION: 98 % | BODY MASS INDEX: 29.35 KG/M2 | SYSTOLIC BLOOD PRESSURE: 138 MMHG

## 2017-10-26 DIAGNOSIS — N40.1 BPH WITH OBSTRUCTION/LOWER URINARY TRACT SYMPTOMS: Primary | ICD-10-CM

## 2017-10-26 DIAGNOSIS — N13.8 BPH WITH OBSTRUCTION/LOWER URINARY TRACT SYMPTOMS: Primary | ICD-10-CM

## 2017-10-26 LAB
BILIRUB UR QL STRIP: NEGATIVE
GLUCOSE UR-MCNC: NEGATIVE MG/DL
KETONES P FAST UR STRIP-MCNC: NEGATIVE MG/DL
PH UR STRIP: 6 [PH] (ref 4.6–8)
PROT UR QL STRIP: NEGATIVE MG/DL
SP GR UR STRIP: 1.02 (ref 1–1.03)
UA UROBILINOGEN AMB POC: NORMAL (ref 0.2–1)
URINALYSIS CLARITY POC: CLEAR
URINALYSIS COLOR POC: YELLOW
URINE BLOOD POC: NORMAL
URINE LEUKOCYTES POC: NORMAL
URINE NITRITES POC: NEGATIVE

## 2017-10-26 RX ORDER — ZOLPIDEM TARTRATE 10 MG/1
TABLET ORAL
Refills: 0 | COMMUNITY
Start: 2017-10-19

## 2017-10-26 NOTE — MR AVS SNAPSHOT
Visit Information Date & Time Provider Department Dept. Phone Encounter #  
 10/26/2017 10:45 AM Lou Novoa Mariela [facility] [phone] [id number] Your Appointments 10/31/2017 11:30 AM  
COMPLETE PHYSICAL with Khari Ramirez MD  
[facility] [place] [address]) Appt Note: [address], [address] [facility] [address]  
  
   
 [address], [address] [facility] [address]  
  
    
 4/26/2018 10:45 AM  
ULTRASOUND with Lou Novoa MD  
[facility] [address]) Appt Note: PVR/PSA  
 [address] Rajesh A [address] [address]  
[phone] [address] [facility] [address] Upcoming Health Maintenance Date Due Hepatitis C Screening 1950 DTaP/Tdap/Td series (1 - Tdap) 6/3/1972 ZOSTER VACCINE AGE 60> 4/3/2011 MEDICARE YEARLY EXAM 6/3/2016 EYE EXAM RETINAL OR DILATED Q1 5/4/2017 MICROALBUMIN Q1 7/6/2017 INFLUENZA AGE 9 TO ADULT 8/1/2017 FOOT EXAM Q1 11/16/2017 HEMOGLOBIN A1C Q6M 11/22/2017 Pneumococcal 65+ Low/Medium Risk (2 of 2 - PPSV23) 11/16/2017 GLAUCOMA SCREENING Q2Y 5/4/2018 LIPID PANEL Q1 5/22/2018 COLONOSCOPY 7/1/2023 Allergies as of 10/26/2017  Review Complete On: 10/26/2017 By: Angelina Peraza LPN Severity Noted Reaction Type Reactions Neurontin [Gabapentin]  08/18/2015    Other (comments)  
 confusion Topamax [Topiramate]  08/18/2015    Other (comments)  
 confusion Current Immunizations  Reviewed on 11/17/2015 Name Date Influenza High Dose Vaccine PF 10/30/2016 Influenza Vaccine PF 11/17/2015 Pneumococcal Conjugate (PCV-13) 11/16/2016 Not reviewed this visit You Were Diagnosed With   
  
 Codes Comments BPH with obstruction/lower urinary tract symptoms    -  Primary ICD-10-CM: N40.1, N13.8 ICD-9-CM: 600.01, 599.69 Vitals BP Pulse Temp Height(growth percentile) Weight(growth percentile) SpO2  
 138/75 (BP 1 Location: Left arm, BP Patient Position: Sitting) 65 97.4 °F (36.3 °C) 5' 10\" (1.778 m) 205 lb (93 kg) 98% BMI Smoking Status 29.41 kg/m2 Former Smoker Vitals History BMI and BSA Data Body Mass Index Body Surface Area  
 29.41 kg/m 2 2.14 m 2 Preferred Pharmacy Pharmacy Name Phone [id number] [phone] Your Updated Medication List  
  
   
This list is accurate as of: 10/26/17 11:21 AM.  Always use your most recent med list.  
  
  
  
  
 ALEVE 220 mg tablet Generic drug:  naproxen sodium Take 220 mg by mouth two (2) times daily as needed. atorvastatin 40 mg tablet Commonly known as:  LIPITOR Take 1 Tab by mouth daily. CENTRUM SILVER PO Take  by mouth.  
  
 citalopram 20 mg tablet Commonly known as:  Tarlton Barrio Take 1 Tab by mouth daily. esomeprazole 40 mg capsule Commonly known as:  Mario Calkin Take 1 Cap by mouth daily. fenofibrate nanocrystallized 145 mg tablet Commonly known as:  TRICOR  
1 qd  
  
 finasteride 5 mg tablet Commonly known as:  PROSCAR  
take 1 tablet by mouth once daily FLEXERIL 10 mg tablet Generic drug:  cyclobenzaprine Take  by mouth three (3) times daily as needed for Muscle Spasm(s). HYDROcodone-acetaminophen  mg tablet Commonly known as:  Nancy Arts Take 1 Tab by mouth.  
  
 loratadine 10 mg tablet Commonly known as:  Emmie Elicia Take 10 mg by mouth daily as needed. pioglitazone 30 mg tablet Commonly known as:  ACTOS  
1 qd  
  
 quinapril 20 mg tablet Commonly known as:  ACCUPRIL Take 1 Tab by mouth nightly. tamsulosin 0.4 mg capsule Commonly known as:  FLOMAX  
take 1 capsule by mouth once daily * zolpidem CR 12.5 mg tablet Commonly known as:  AMBIEN CR  
 Take 12.5 mg by mouth nightly as needed for Sleep. * zolpidem 10 mg tablet Commonly known as:  AMBIEN  
  
 * Notice: This list has 2 medication(s) that are the same as other medications prescribed for you. Read the directions carefully, and ask your doctor or other care provider to review them with you. We Performed the Following AMB POC URINALYSIS DIP STICK AUTO W/O MICRO [17516 CPT(R)] [place] JENNIFER,POST-VOID RES,US,NON-IMAGING [id number] CPT(R)] Patient Instructions Learning About Transurethral Resection of the Prostate (TURP) What is transurethral resection of the prostate (TURP)? Transurethral resection of the prostate (TURP) is surgery to remove some prostate tissue. It is done when an overgrown prostate gland is pressing on the urethra and making it hard for a man to urinate. The prostate gland is a small organ just below a man's bladder. It makes most of the fluid in semen. The urethra is the tube that carries urine from the bladder out of the body through the penis. It passes through the prostate. When the prostate gets too large, it can press on the urethra. TURP is done to take pressure off of the urethra. It can help you have better control over starting and stopping your urine stream. You may feel like you get more relief when you urinate. How is the surgery done? Your doctor will give you medicine to make you sleep or feel relaxed. You will be kept comfortable. If you are awake during the surgery, you will get medicine to numb you from the chest down. The doctor will put a thin, lighted tube, which is called a scope, into your urethra through the opening in your penis. Then the doctor will put small surgical tools or a tiny laser through the scope. He or she will then cut or burn away the section of the prostate that is blocking urine flow. When the surgery is finished, the doctor will take out the scope. What can you expect after the surgery? You may stay in the hospital for 1 to 2 days after the surgery. You may be able to go back to work and do many of your usual activities in 1 to 3 weeks. But it is important to avoid heavy lifting or strenuous activities for about 6 weeks. If your surgery was done with a laser, you may feel better faster. Most men go home on the day of laser surgery, then see their doctor soon after. You may be able to go back to work and your usual activities after a few days. And you may be able to return to strenuous activity and heavy lifting after about 2 weeks. But talk to your doctor first. 
Castaneda Graft may need a urinary catheter for a short time. This is a flexible plastic tube used to drain urine from your bladder when you can't urinate on your own. If it is still in place when you go home, your doctor will give you instructions for how to care for your catheter. You may still feel like you need to urinate often in the weeks after your surgery. It often takes up to 6 weeks for this to get better. After they recover from surgery, most men still can have erections (if they were able to have them before surgery). But they may not ejaculate when they have an orgasm. Semen may go into the bladder instead of out through the penis. This is called retrograde ejaculation. It does not hurt and is not harmful to your health. But it may mean that you will not be able to father a child. If this is a concern, talk to your doctor. You may be able to save your sperm before the surgery. Follow-up care is a key part of your treatment and safety. Be sure to make and go to all appointments, and call your doctor if you are having problems. It's also a good idea to know your test results and keep a list of the medicines you take. Where can you learn more? Go to http://jhonatan-mayda.info/. Enter Z462 in the search box to learn more about \"Learning About Transurethral Resection of the Prostate (TURP). \" Current as of: March 14, 2017 Content Version: 11.4 © 6438-4880 [employer]. Care instructions adapted under license by [employer] (which disclaims liability or warranty for this information). If you have questions about a medical condition or this instruction, always ask your healthcare professional. [address] any warranty or liability for your use of this information. Introducing hospitals & HEALTH SERVICES! Dennis Zhang introduces Togethera patient portal. Now you can access parts of your medical record, email your doctor's office, and request medication refills online. 1. In your internet browser, go to [email or web address] 2. Click on the First Time User? Click Here link in the Sign In box. You will see the New Member Sign Up page. 3. Enter your Togethera Access Code exactly as it appears below. You will not need to use this code after youve completed the sign-up process. If you do not sign up before the expiration date, you must request a new code. · Togethera Access Code: [email or web address] Expires: 1/24/2018 10:31 AM 
 
4. Enter the last four digits of your Social Security Number (xxxx) and Date of Birth (mm/dd/yyyy) as indicated and click Submit. You will be taken to the next sign-up page. 5. Create a Togethera ID. This will be your Togethera login ID and cannot be changed, so think of one that is secure and easy to remember. 6. Create a Togethera password. You can change your password at any time. 7. Enter your Password Reset Question and Answer. This can be used at a later time if you forget your password. 8. Enter your e-mail address. You will receive e-mail notification when new information is available in [address] 9. Click Sign Up. You can now view and download portions of your medical record. 10. Click the Download Summary menu link to download a portable copy of your medical information. If you have questions, please visit the Frequently Asked Questions section of the [employer]t website. Remember, ManyWho is NOT to be used for urgent needs. For medical emergencies, dial 911. Now available from your iPhone and Android! Please provide this summary of care documentation to your next provider. Your primary care clinician is listed as Eligio Kilgore. If you have any questions after today's visit, please call [phone].

## 2017-10-26 NOTE — PATIENT INSTRUCTIONS
Learning About Transurethral Resection of the Prostate (TURP)  What is transurethral resection of the prostate (TURP)? Transurethral resection of the prostate (TURP) is surgery to remove some prostate tissue. It is done when an overgrown prostate gland is pressing on the urethra and making it hard for a man to urinate. The prostate gland is a small organ just below a man's bladder. It makes most of the fluid in semen. The urethra is the tube that carries urine from the bladder out of the body through the penis. It passes through the prostate. When the prostate gets too large, it can press on the urethra. TURP is done to take pressure off of the urethra. It can help you have better control over starting and stopping your urine stream. You may feel like you get more relief when you urinate. How is the surgery done? Your doctor will give you medicine to make you sleep or feel relaxed. You will be kept comfortable. If you are awake during the surgery, you will get medicine to numb you from the chest down. The doctor will put a thin, lighted tube, which is called a scope, into your urethra through the opening in your penis. Then the doctor will put small surgical tools or a tiny laser through the scope. He or she will then cut or burn away the section of the prostate that is blocking urine flow. When the surgery is finished, the doctor will take out the scope. What can you expect after the surgery? You may stay in the hospital for 1 to 2 days after the surgery. You may be able to go back to work and do many of your usual activities in 1 to 3 weeks. But it is important to avoid heavy lifting or strenuous activities for about 6 weeks. If your surgery was done with a laser, you may feel better faster. Most men go home on the day of laser surgery, then see their doctor soon after. You may be able to go back to work and your usual activities after a few days.  And you may be able to return to strenuous activity and heavy lifting after about 2 weeks. But talk to your doctor first.  Ludmila Cho may need a urinary catheter for a short time. This is a flexible plastic tube used to drain urine from your bladder when you can't urinate on your own. If it is still in place when you go home, your doctor will give you instructions for how to care for your catheter. You may still feel like you need to urinate often in the weeks after your surgery. It often takes up to 6 weeks for this to get better. After they recover from surgery, most men still can have erections (if they were able to have them before surgery). But they may not ejaculate when they have an orgasm. Semen may go into the bladder instead of out through the penis. This is called retrograde ejaculation. It does not hurt and is not harmful to your health. But it may mean that you will not be able to father a child. If this is a concern, talk to your doctor. You may be able to save your sperm before the surgery. Follow-up care is a key part of your treatment and safety. Be sure to make and go to all appointments, and call your doctor if you are having problems. It's also a good idea to know your test results and keep a list of the medicines you take. Where can you learn more? Go to http://jhonatan-mayda.info/. Enter Y409 in the search box to learn more about \"Learning About Transurethral Resection of the Prostate (TURP). \"  Current as of: March 14, 2017  Content Version: 11.4  © 8185-8320 [employer]. Care instructions adapted under license by [employer] (which disclaims liability or warranty for this information). If you have questions about a medical condition or this instruction, always ask your healthcare professional. [address] any warranty or liability for your use of this information.

## 2017-10-26 NOTE — PROGRESS NOTES
Pietro Johnnie Dorsey has a reminder for a \"due or due soon\" health maintenance. I have asked that he contact his primary care provider for follow-up on this health maintenance.

## 2017-10-26 NOTE — PROGRESS NOTES
Radha Singleton  78 y.o. male     Mr. Trinity Lowe seen today for symptomatic BPH on alpha-blocker therapy with Flomax and 5 alpha reductase inhibitor Proscar patient is here today for PVR assessment of voiding efficiency watching for signs of silent prostatism with incipient urinary retention-no new voiding complaints at this time    Patient is voiding with a forceful stream good control no daytime urgency frequency and no episodes of nocturia since beginning treatment with these medications        PSA 1.1 and August 2013  PSA 1.1 in August 2014  PSA 0.9 in September 2015  PSA 0.8 in October 2016  PSA 0.8 in April 2017    Testosterone 245     PVR 59 cc in October 2016   cc in April 2017  PVR 74 cc in October 2017      Review of Systems:    CNS: No headaches seizures syncope or visual changes  Respiratory: No lesion or shortness of breath  Cardiovascular:Hypertension  Intestinal:GERD no change in bowel habits  Urinary: Symptomatic BPH  Skeletal: no bone or joint pain  Endocrine:Large joint arthritisNo diabetes  Other:      Allergies: Allergies   Allergen Reactions    Neurontin [Gabapentin] Other (comments)     confusion    Topamax [Topiramate] Other (comments)     confusion      Medications:    Current Outpatient Prescriptions   Medication Sig Dispense Refill    zolpidem (AMBIEN) 10 mg tablet   0    finasteride (PROSCAR) 5 mg tablet take 1 tablet by mouth once daily 90 Tab 3    quinapril (ACCUPRIL) 20 mg tablet Take 1 Tab by mouth nightly. 90 Tab 4    fenofibrate nanocrystallized (TRICOR) 145 mg tablet 1 qd 90 Tab 4    tamsulosin (FLOMAX) 0.4 mg capsule take 1 capsule by mouth once daily 30 Cap 3    naproxen sodium (ALEVE) 220 mg tablet Take 220 mg by mouth two (2) times daily as needed.  loratadine (CLARITIN) 10 mg tablet Take 10 mg by mouth daily as needed.  citalopram (CELEXA) 20 mg tablet Take 1 Tab by mouth daily. 90 Tab 3    esomeprazole (NEXIUM) 40 mg capsule Take 1 Cap by mouth daily. 90 Cap 3    FOLIC ACID/MULTIVIT-MIN/LUTEIN (CENTRUM SILVER PO) Take  by mouth.  cyclobenzaprine (FLEXERIL) 10 mg tablet Take  by mouth three (3) times daily as needed for Muscle Spasm(s).  pioglitazone (ACTOS) 30 mg tablet 1 qd 90 Tab 3    atorvastatin (LIPITOR) 40 mg tablet Take 1 Tab by mouth daily. 90 Tab 3    hydrocodone-acetaminophen (LORTAB)  mg per tablet Take 1 Tab by mouth.  zolpidem CR (AMBIEN CR) 12.5 mg tablet Take 12.5 mg by mouth nightly as needed for Sleep.          Past Medical History:   Diagnosis Date    Bilateral shoulder pain     Chronic airway obstruction, not elsewhere classified     Depressive disorder, not elsewhere classified     Esophagitis, unspecified     Generalized osteoarthrosis, involving multiple sites     GERD (gastroesophageal reflux disease)     Headache(784.0)     Hematuria     neg cysto    Hypercholesterolemia     Hypercholesterolemia     Hypertension     Mixed hyperlipidemia     Neck pain     Pancreatitis 6/2014    Rheumatic fever     w/o sequelae    Type II or unspecified type diabetes mellitus without mention of complication, not stated as uncontrolled     Unspecified hyperplasia of prostate with urinary obstruction and other lower urinary tract symptoms (LUTS)       Past Surgical History:   Procedure Laterality Date    HX APPENDECTOMY      HX COLONOSCOPY  7/26/2013    diverticulosis    HX OPEN REDUCTION INTERNAL FIXATION Right     clavicle fx     Family History   Problem Relation Age of Onset    Cancer Father     Cancer Other     Heart Disease Maternal Grandmother     Heart Disease Paternal Grandfather     Other Sister      brain hemorrhage        Physical Examination: Well-nourished mature male in no apparent distress  Normal prostate by HERMAN in April 2017    Urinalysis: Negative dipstick/nitrite negative/heme-negative    PVR today 74 cc    Impression: Symptomatic BPH responding favorably to alpha-blocker and 5 alpha reductase inhibitor therapy        Plan: Flomax 0.4 mg daily            Finasteride 5 mg daily    rtc 6 mo PSA HERMAN PVR      More than 1/2 of this 15 minute visit was spent in counselling and coordination of care, as described above. Alannah Lilly MD  -electronically signed-    PLEASE NOTE:  This document has been produced using voice recognition software. Unrecognized errors in transcription may be present.

## 2017-10-31 ENCOUNTER — OFFICE VISIT (OUTPATIENT)
Dept: INTERNAL MEDICINE CLINIC | Age: 67
End: 2017-10-31

## 2017-10-31 ENCOUNTER — HOSPITAL ENCOUNTER (OUTPATIENT)
Dept: LAB | Age: 67
Discharge: HOME | End: 2017-10-31
Payer: COMMERCIAL

## 2017-10-31 VITALS
DIASTOLIC BLOOD PRESSURE: 70 MMHG | SYSTOLIC BLOOD PRESSURE: 110 MMHG | HEART RATE: 63 BPM | RESPIRATION RATE: 16 BRPM | HEIGHT: 70 IN | OXYGEN SATURATION: 98 % | TEMPERATURE: 97.7 F | BODY MASS INDEX: 29.49 KG/M2 | WEIGHT: 206 LBS

## 2017-10-31 DIAGNOSIS — E11.9 TYPE 2 DIABETES MELLITUS WITHOUT COMPLICATION, WITHOUT LONG-TERM CURRENT USE OF INSULIN (HCC): ICD-10-CM

## 2017-10-31 DIAGNOSIS — Z23 ENCOUNTER FOR IMMUNIZATION: ICD-10-CM

## 2017-10-31 DIAGNOSIS — I10 ESSENTIAL HYPERTENSION, BENIGN: ICD-10-CM

## 2017-10-31 DIAGNOSIS — E78.2 MIXED HYPERLIPIDEMIA: ICD-10-CM

## 2017-10-31 DIAGNOSIS — J61 ASBESTOSIS (HCC): ICD-10-CM

## 2017-10-31 DIAGNOSIS — Z00.00 ROUTINE GENERAL MEDICAL EXAMINATION AT A HEALTH CARE FACILITY: Primary | ICD-10-CM

## 2017-10-31 LAB
ALBUMIN SERPL-MCNC: 4.2 G/DL (ref 3.4–5)
ALBUMIN/GLOB SERPL: 1.6 {RATIO} (ref 0.8–1.7)
ALP SERPL-CCNC: 54 U/L (ref 45–117)
ALT SERPL-CCNC: 54 U/L (ref 16–61)
ANION GAP SERPL CALC-SCNC: 8 MMOL/L (ref 3–18)
AST SERPL-CCNC: 38 U/L (ref 15–37)
BASOPHILS # BLD: 0 K/UL (ref 0–0.06)
BASOPHILS NFR BLD: 0 % (ref 0–2)
BILIRUB SERPL-MCNC: 0.4 MG/DL (ref 0.2–1)
BUN SERPL-MCNC: 28 MG/DL (ref 7–18)
BUN/CREAT SERPL: 21 (ref 12–20)
CALCIUM SERPL-MCNC: 9.4 MG/DL (ref 8.5–10.1)
CHLORIDE SERPL-SCNC: 105 MMOL/L (ref 100–108)
CHOLEST SERPL-MCNC: 151 MG/DL
CO2 SERPL-SCNC: 28 MMOL/L (ref 21–32)
CREAT SERPL-MCNC: 1.36 MG/DL (ref 0.6–1.3)
DIFFERENTIAL METHOD BLD: ABNORMAL
EOSINOPHIL # BLD: 0.2 K/UL (ref 0–0.4)
EOSINOPHIL NFR BLD: 4 % (ref 0–5)
ERYTHROCYTE [DISTWIDTH] IN BLOOD BY AUTOMATED COUNT: 13.1 % (ref 11.6–14.5)
EST. AVERAGE GLUCOSE BLD GHB EST-MCNC: 120 MG/DL
GLOBULIN SER CALC-MCNC: 2.7 G/DL (ref 2–4)
GLUCOSE SERPL-MCNC: 88 MG/DL (ref 74–99)
HBA1C MFR BLD: 5.8 % (ref 4.2–5.6)
HCT VFR BLD AUTO: 42.7 % (ref 36–48)
HDLC SERPL-MCNC: 26 MG/DL (ref 40–60)
HDLC SERPL: 5.8 {RATIO} (ref 0–5)
HGB BLD-MCNC: 14.1 G/DL (ref 13–16)
LDLC SERPL CALC-MCNC: 78.4 MG/DL (ref 0–100)
LIPID PROFILE,FLP: ABNORMAL
LYMPHOCYTES # BLD: 1.8 K/UL (ref 0.9–3.6)
LYMPHOCYTES NFR BLD: 33 % (ref 21–52)
MCH RBC QN AUTO: 30.1 PG (ref 24–34)
MCHC RBC AUTO-ENTMCNC: 33 G/DL (ref 31–37)
MCV RBC AUTO: 91.2 FL (ref 74–97)
MONOCYTES # BLD: 0.6 K/UL (ref 0.05–1.2)
MONOCYTES NFR BLD: 12 % (ref 3–10)
NEUTS SEG # BLD: 2.8 K/UL (ref 1.8–8)
NEUTS SEG NFR BLD: 51 % (ref 40–73)
PLATELET # BLD AUTO: 217 K/UL (ref 135–420)
PMV BLD AUTO: 11.3 FL (ref 9.2–11.8)
POTASSIUM SERPL-SCNC: 4.7 MMOL/L (ref 3.5–5.5)
PROT SERPL-MCNC: 6.9 G/DL (ref 6.4–8.2)
RBC # BLD AUTO: 4.68 M/UL (ref 4.7–5.5)
SODIUM SERPL-SCNC: 141 MMOL/L (ref 136–145)
TRIGL SERPL-MCNC: 233 MG/DL (ref ?–150)
VLDLC SERPL CALC-MCNC: 46.6 MG/DL
WBC # BLD AUTO: 5.5 K/UL (ref 4.6–13.2)

## 2017-10-31 PROCEDURE — 80061 LIPID PANEL: CPT | Performed by: INTERNAL MEDICINE

## 2017-10-31 PROCEDURE — 82043 UR ALBUMIN QUANTITATIVE: CPT | Performed by: INTERNAL MEDICINE

## 2017-10-31 PROCEDURE — 85025 COMPLETE CBC W/AUTO DIFF WBC: CPT | Performed by: INTERNAL MEDICINE

## 2017-10-31 PROCEDURE — 80053 COMPREHEN METABOLIC PANEL: CPT | Performed by: INTERNAL MEDICINE

## 2017-10-31 PROCEDURE — 83036 HEMOGLOBIN GLYCOSYLATED A1C: CPT | Performed by: INTERNAL MEDICINE

## 2017-10-31 NOTE — MR AVS SNAPSHOT
Visit Information Date & Time Provider Department Dept. Phone Encounter #  
 10/31/2017 11:30 AM Emil Gillette MD [facility]IC[facility] OF [place] [phone] [id number] Follow-up Instructions Return if symptoms worsen or fail to improve. Follow-up and Disposition History Your Appointments 4/26/2018 10:45 AM  
ULTRASOUND with Casey Dixon MD  
[facility]: PVR/PSA  
 [address] Rajesh A [address] [address]  
[phone] [address] [address] Upcoming Health Maintenance Date Due Hepatitis C Screening 1950 DTaP/Tdap/Td series (1 - Tdap) 6/3/1972 ZOSTER VACCINE AGE 60> 4/3/2011 MEDICARE YEARLY EXAM 6/3/2016 EYE EXAM RETINAL OR DILATED Q1 5/4/2017 MICROALBUMIN Q1 7/6/2017 INFLUENZA AGE 9 TO ADULT 8/1/2017 FOOT EXAM Q1 11/16/2017 HEMOGLOBIN A1C Q6M 11/22/2017 Pneumococcal 65+ Low/Medium Risk (2 of 2 - PPSV23) 11/16/2017 GLAUCOMA SCREENING Q2Y 5/4/2018 LIPID PANEL Q1 5/22/2018 COLONOSCOPY 7/1/2023 Allergies as of 10/31/2017  Review Complete On: 10/31/2017 By: Emil Gillette MD  
  
 Severity Noted Reaction Type Reactions Neurontin [Gabapentin]  08/18/2015    Other (comments)  
 confusion Topamax [Topiramate]  08/18/2015    Other (comments)  
 confusion Current Immunizations  Reviewed on 11/17/2015 Name Date Influenza High Dose Vaccine PF 10/31/2017, 10/30/2016 Influenza Vaccine PF 11/17/2015 Pneumococcal Conjugate (PCV-13) 11/16/2016 Pneumococcal Polysaccharide (PPSV-23) 10/31/2017 Not reviewed this visit You Were Diagnosed With   
  
 Codes Comments Routine general medical examination at a health care facility    -  Primary ICD-10-CM: Z00.00 ICD-9-CM: V70.0  Type 2 diabetes mellitus without complication, without long-term current use of insulin ([facility]     ICD-10-CM: E11.9 ICD-9-CM: 250.00 Essential hypertension, benign     ICD-10-CM: I10 
ICD-9-CM: 401.1 Mixed hyperlipidemia     ICD-10-CM: E78.2 ICD-9-CM: 272.2 Encounter for immunization     ICD-10-CM: W32 ICD-9-CM: V03.89 Asbestosis ([facility].)     ICD-10-CM: D41 ICD-9-CM: 701 Vitals BP Pulse Temp Resp Height(growth percentile) 110/70 (BP 1 Location: Left arm, BP Patient Position: Sitting) 63 97.7 °F (36.5 °C) (Tympanic) 16 5' 10\" (1.778 m) Weight(growth percentile) SpO2 BMI Smoking Status 206 lb (93.4 kg) 98% 29.56 kg/m2 Former Smoker Vitals History BMI and BSA Data Body Mass Index Body Surface Area  
 29.56 kg/m 2 2.15 m 2 Preferred Pharmacy Pharmacy Name Phone [id number] [phone] Your Updated Medication List  
  
   
This list is accurate as of: 10/31/17 12:37 PM.  Always use your most recent med list.  
  
  
  
  
 ALEVE 220 mg tablet Generic drug:  naproxen sodium Take 220 mg by mouth two (2) times daily as needed. atorvastatin 40 mg tablet Commonly known as:  LIPITOR Take 1 Tab by mouth daily. CENTRUM SILVER PO Take  by mouth.  
  
 citalopram 20 mg tablet Commonly known as:  Marccindya Nation Take 1 Tab by mouth daily. esomeprazole 40 mg capsule Commonly known as:  Muller Hiss Take 1 Cap by mouth daily. fenofibrate nanocrystallized 145 mg tablet Commonly known as:  TRICOR  
1 qd  
  
 finasteride 5 mg tablet Commonly known as:  PROSCAR  
take 1 tablet by mouth once daily FLEXERIL 10 mg tablet Generic drug:  cyclobenzaprine Take  by mouth three (3) times daily as needed for Muscle Spasm(s). HYDROcodone-acetaminophen  mg tablet Commonly known as:  Julaine Kava Take 1 Tab by mouth.  
  
 loratadine 10 mg tablet Commonly known as:  Irma Reed Take 10 mg by mouth daily as needed. pioglitazone 30 mg tablet Commonly known as:  ACTOS  
1 qd  
  
 quinapril 20 mg tablet Commonly known as:  ACCUPRIL Take 1 Tab by mouth nightly. tamsulosin 0.4 mg capsule Commonly known as:  FLOMAX  
take 1 capsule by mouth once daily * zolpidem CR 12.5 mg tablet Commonly known as:  AMBIEN CR Take 12.5 mg by mouth nightly as needed for Sleep. * zolpidem 10 mg tablet Commonly known as:  AMBIEN  
  
 * Notice: This list has 2 medication(s) that are the same as other medications prescribed for you. Read the directions carefully, and ask your doctor or other care provider to review them with you. We Performed the Following AMB POC EKG ROUTINE W/ 12 LEADS, INTER & REP [27348 CPT(R)] INFLUENZA VIRUS VACCINE, HIGH DOSE SEASONAL, PRESERVATIVE FREE [16156 CPT(R)] PNEUMOCOCCAL POLYSACCHARIDE VACCINE, 23-VALENT, ADULT OR IMMUNOSUPPRESSED PT DOSE, [33719 CPT(R)] REFERRAL TO PULMONARY DISEASE [ZDN26 Custom] Comments:  
 Please evaluate patient for  asbestosis Follow-up Instructions Return if symptoms worsen or fail to improve. To-Do List   
 11/03/2017 1:00 PM  
  Appointment with [facility] RM 1 at [facility] ([phone]) DIET RESTRICTIONS  Nothing to eat or drink 4 hours prior to study May have water to take meds  GENERAL INSTRUCTIONS  If you were given medications to take for a contrast allergy prior to having this study, please arrange to have someone drive you to your appointment. If you have had a creatinine level drawn within the past 30 days, please bring most recent results to your appt. This study does not require you to drink contrast prior to your study. MEDICATIONS  Bring a complete list of all medications you are currently taking to include prescriptions, over-the-counter meds, herbals, vitamins & any dietary supplements.   OUTSIDE FILMS  Bring outside films, CDs, and reports related to the study with you on the day of your exam. QUESTIONS  Notify the CT Department if you have any questions concerning your study. Elisa Slade - [phone] [facility] 109 - [phone]  
  
 11/10/2017 Imaging:  CT CHEST W WO CONT Referral Information Referral ID Referred By Referred To  
  
 [id number] Valerio Mejía MD   
   Formerly [facility] [place], [address] [address] Phone: [phone] Fax: [phone] Visits Status Start Date End Date 1 New Request 10/31/17 10/31/18 If your referral has a status of pending review or denied, additional information will be sent to support the outcome of this decision. Patient Instructions Learning About Meal Planning for Diabetes Why plan your meals? Meal planning can be a key part of managing diabetes. Planning meals and snacks with the right balance of carbohydrate, protein, and fat can help you keep your blood sugar at the target level you set with your doctor. You don't have to eat special foods. You can eat what your family eats, including sweets once in a while. But you do have to pay attention to how often you eat and how much you eat of certain foods. You may want to work with a dietitian or a certified diabetes educator. He or she can give you tips and meal ideas and can answer your questions about meal planning. This health professional can also help you reach a healthy weight if that is one of your goals. What plan is right for you? Your dietitian or diabetes educator may suggest that you start with the plate format or carbohydrate counting. The plate format The plate format is a simple way to help you manage how you eat. You plan meals by learning how much space each food should take on a plate. Using the plate format helps you spread carbohydrate throughout the day. It can make it easier to keep your blood sugar level within your target range.  It also helps you see if you're eating healthy portion sizes. To use the plate format, you put non-starchy vegetables on half your plate. Add meat or meat substitutes on one-quarter of the plate. Put a grain or starchy vegetable (such as brown rice or a potato) on the final quarter of the plate. You can add a small piece of fruit and some low-fat or fat-free milk or yogurt, depending on your carbohydrate goal for each meal. 
Here are some tips for using the plate format: · Make sure that you are not using an oversized plate. A 9-inch plate is best. Many restaurants use larger plates. · Get used to using the plate format at home. Then you can use it when you eat out. · Write down your questions about using the plate format. Talk to your doctor, a dietitian, or a diabetes educator about your concerns. Carbohydrate counting With carbohydrate counting, you plan meals based on the amount of carbohydrate in each food. Carbohydrate raises blood sugar higher and more quickly than any other nutrient. It is found in desserts, breads and cereals, and fruit. It's also found in starchy vegetables such as potatoes and corn, grains such as rice and pasta, and milk and yogurt. Spreading carbohydrate throughout the day helps keep your blood sugar levels within your target range. Your daily amount depends on several things, including your weight, how active you are, which diabetes medicines you take, and what your goals are for your blood sugar levels. A registered dietitian or diabetes educator can help you plan how much carbohydrate to include in each meal and snack. A guideline for your daily amount of carbohydrate is: · 45 to 60 grams at each meal. That's about the same as 3 to 4 carbohydrate servings. · 15 to 20 grams at each snack. That's about the same as 1 carbohydrate serving. The Nutrition Facts label on packaged foods tells you how much carbohydrate is in a serving of the food.  First, look at the serving size on the food label. Is that the amount you eat in a serving? All of the nutrition information on a food label is based on that serving size. So if you eat more or less than that, you'll need to adjust the other numbers. Total carbohydrate is the next thing you need to look for on the label. If you count carbohydrate servings, one serving of carbohydrate is 15 grams. For foods that don't come with labels, such as fresh fruits and vegetables, you'll need a guide that lists carbohydrate in these foods. Ask your doctor, dietitian, or diabetes educator about books or other nutrition guides you can use. If you take insulin, you need to know how many grams of carbohydrate are in a meal. This lets you know how much rapid-acting insulin to take before you eat. If you use an insulin pump, you get a constant rate of insulin during the day. So the pump must be programmed at meals to give you extra insulin to cover the rise in blood sugar after meals. When you know how much carbohydrate you will eat, you can take the right amount of insulin. Or, if you always use the same amount of insulin, you need to make sure that you eat the same amount of carbohydrate at meals. If you need more help to understand carbohydrate counting and food labels, ask your doctor, dietitian, or diabetes educator. How do you get started with meal planning? Here are some tips to get started: 
· Plan your meals a week at a time. Don't forget to include snacks too. · Use cookbooks or online recipes to plan several main meals. Plan some quick meals for busy nights. You also can double some recipes that freeze well. Then you can save half for other busy nights when you don't have time to cook. · Make sure you have the ingredients you need for your recipes. If you're running low on basic items, put these items on your shopping list too. · List foods that you use to make breakfasts, lunches, and snacks. List plenty of fruits and vegetables. · Post this list on the refrigerator. Add to it as you think of more things you need. · Take the list to the store to do your weekly shopping. Follow-up care is a key part of your treatment and safety. Be sure to make and go to all appointments, and call your doctor if you are having problems. It's also a good idea to know your test results and keep a list of the medicines you take. Where can you learn more? Go to http://jhonatan-mayda.info/. Socorro Mayberry in the search box to learn more about \"Learning About Meal Planning for Diabetes. \" Current as of: March 13, 2017 Content Version: 11.4 © 4454-6429 [employer] Care instructions adapted under license by [employer] (which disclaims liability or warranty for this information). If you have questions about a medical condition or this instruction, always ask your healthcare professional. [address] any warranty or liability for your use of this information. Introducing [facility]! Fito Holloway introduces [employer] patient portal. Now you can access parts of your medical record, email your doctor's office, and request medication refills online. 1. In your internet browser, go to [email or web address] 2. Click on the First Time User? Click Here link in the Sign In box. You will see the New Member Sign Up page. 3. Enter your [employer] Access Code exactly as it appears below. You will not need to use this code after youve completed the sign-up process. If you do not sign up before the expiration date, you must request a new code. · [employer] Access Code: [email or web address] Expires: 1/24/2018 10:31 AM 
 
4. Enter the last four digits of your Social Security Number (xxxx) and Date of Birth (mm/dd/yyyy) as indicated and click Submit. You will be taken to the next sign-up page. 5. Create a [employer] ID.  This will be your [employer] login ID and cannot be changed, so think of one that is secure and easy to remember. 6. Create a [employer] password. You can change your password at any time. 7. Enter your Password Reset Question and Answer. This can be used at a later time if you forget your password. 8. Enter your e-mail address. You will receive e-mail notification when new information is available in [address] 9. Click Sign Up. You can now view and download portions of your medical record. 10. Click the Download Summary menu link to download a portable copy of your medical information. If you have questions, please visit the Frequently Asked Questions section of the [employer] website. Remember, [employer] is NOT to be used for urgent needs. For medical emergencies, dial 911. Now available from your iPhone and Android! Please provide this summary of care documentation to your next provider. Your primary care clinician is listed as Josh Hidalgo. If you have any questions after today's visit, please call [phone].

## 2017-10-31 NOTE — PROGRESS NOTES
HPI:   Check up  F/u visit for routine evaluation of HTN, T2DM, hyperlipidemia  A1C/chol May 2017 - 6/149  2 weeks of resolving nonexertional (R) sided CP post sawing wood  Recently underwent asbestos eval and CXR showed asbestosis as well as prominent articulation of (R) first rib  w/o abd. discomfort; no dyspnea, cough or pedal edema; denies constitutional complaints of fever, night sweats or wt loss; no evidence of GI/ hemorrhage; chronic BPH sxs. Activity is age appropriate despite chronic pain    ROS is otherwise negative. Past Medical History:   Diagnosis Date    Bilateral shoulder pain     Chronic airway obstruction, not elsewhere classified     Depressive disorder, not elsewhere classified     Esophagitis, unspecified     Generalized osteoarthrosis, involving multiple sites     GERD (gastroesophageal reflux disease)     Headache(784.0)     Hematuria     neg cysto    Hypercholesterolemia     Hypercholesterolemia     Hypertension     Mixed hyperlipidemia     Neck pain     Pancreatitis 6/2014    Rheumatic fever     w/o sequelae    Type II or unspecified type diabetes mellitus without mention of complication, not stated as uncontrolled     Unspecified hyperplasia of prostate with urinary obstruction and other lower urinary tract symptoms (LUTS)        Past Surgical History:   Procedure Laterality Date    HX APPENDECTOMY      HX COLONOSCOPY  7/26/2013    diverticulosis    HX OPEN REDUCTION INTERNAL FIXATION Right     clavicle fx       Social History     Social History    Marital status:      Spouse name: N/A    Number of children: N/A    Years of education: N/A     Occupational History    Not on file.      Social History Main Topics    Smoking status: Former Smoker     Packs/day: 1.00     Quit date: 4/10/2011    Smokeless tobacco: Former User    Alcohol use No      Comment: a few beers    Drug use: No    Sexual activity: Not on file     Other Topics Concern    Not on file Social History Narrative       Allergies   Allergen Reactions    Neurontin [Gabapentin] Other (comments)     confusion    Topamax [Topiramate] Other (comments)     confusion       Family History   Problem Relation Age of Onset    Cancer Father     Cancer Other     Heart Disease Maternal Grandmother     Heart Disease Paternal Grandfather     Other Sister      brain hemorrhage       Current Outpatient Prescriptions   Medication Sig Dispense Refill    zolpidem (AMBIEN) 10 mg tablet   0    finasteride (PROSCAR) 5 mg tablet take 1 tablet by mouth once daily 90 Tab 3    quinapril (ACCUPRIL) 20 mg tablet Take 1 Tab by mouth nightly. 90 Tab 4    fenofibrate nanocrystallized (TRICOR) 145 mg tablet 1 qd 90 Tab 4    tamsulosin (FLOMAX) 0.4 mg capsule take 1 capsule by mouth once daily 30 Cap 3    naproxen sodium (ALEVE) 220 mg tablet Take 220 mg by mouth two (2) times daily as needed.  loratadine (CLARITIN) 10 mg tablet Take 10 mg by mouth daily as needed.  citalopram (CELEXA) 20 mg tablet Take 1 Tab by mouth daily. 90 Tab 3    esomeprazole (NEXIUM) 40 mg capsule Take 1 Cap by mouth daily. 90 Cap 3    FOLIC ACID/MULTIVIT-MIN/LUTEIN (CENTRUM SILVER PO) Take  by mouth.  cyclobenzaprine (FLEXERIL) 10 mg tablet Take  by mouth three (3) times daily as needed for Muscle Spasm(s).  pioglitazone (ACTOS) 30 mg tablet 1 qd 90 Tab 3    atorvastatin (LIPITOR) 40 mg tablet Take 1 Tab by mouth daily. 90 Tab 3    zolpidem CR (AMBIEN CR) 12.5 mg tablet Take 12.5 mg by mouth nightly as needed for Sleep.  hydrocodone-acetaminophen (LORTAB)  mg per tablet Take 1 Tab by mouth. Visit Vitals    /70 (BP 1 Location: Left arm, BP Patient Position: Sitting)    Pulse 63    Temp 97.7 °F (36.5 °C) (Tympanic)    Resp 16    Ht 5' 10\" (1.778 m)    Wt 206 lb (93.4 kg)    SpO2 98%    BMI 29.56 kg/m2       PE  Well nourished in NAD  HEENT: PERRLA, EOMI;  OP: clear.   Neck: supple w/o mass or bruits. Chest: clear. CV: RRR w/o m,r,g; pulses intact. Abd: soft, NT, w/o HSM or mass. Rectal: heme neg; prostate 3 FBS and smooth  Ext: w/o edema. Neuro: NF.  ECG: WNL    Assessment and Plan    Encounter Diagnoses   Name Primary?  Routine general medical examination at a health care facility Yes    Type 2 diabetes mellitus without complication, without long-term current use of insulin (HCC)     Essential hypertension, benign     Mixed hyperlipidemia    BP @ goal  Labs to assess metabolic parameters ([id number]/[id number])  Up to date with eye exams  Asbestosis - CT/pulmonary consult arranged  [facility] CP - call if worsens or unimproved 10 days  Flu/pneumovax given  [place] done 7/2013 (diverticula)  Continue dietary/exercise efforts  I have reviewed/discussed the above normal BMI with the patient. I have recommended the following interventions: dietary management education, guidance, and counseling . Katie Villeda     No change in rx  OV 6 mos or prn  I have explained plan to patient and the patient verbalizes understanding

## 2017-10-31 NOTE — PATIENT INSTRUCTIONS

## 2017-10-31 NOTE — PROGRESS NOTES
1. Have you been to the ER, urgent care clinic since your last visit? Hospitalized since your last visit? No    2. Have you seen or consulted any other health care providers outside of the [address] [place] [address] since your last visit? Include any pap smears or colon screening.  No

## 2017-11-01 LAB
CREAT UR-MCNC: 168.51 MG/DL (ref 30–125)
MICROALBUMIN UR-MCNC: 0.7 MG/DL (ref 0–3)
MICROALBUMIN/CREAT UR-RTO: 4 MG/G (ref 0–30)

## 2017-11-03 ENCOUNTER — HOSPITAL ENCOUNTER (OUTPATIENT)
Dept: CT IMAGING | Age: 67
Discharge: HOME | End: 2017-11-03
Attending: INTERNAL MEDICINE
Payer: COMMERCIAL

## 2017-11-03 DIAGNOSIS — J61 ASBESTOSIS (HCC): ICD-10-CM

## 2017-11-03 PROCEDURE — 71260 CT THORAX DX C+: CPT

## 2017-11-03 PROCEDURE — 74011636320 HC RX REV CODE- 636/320

## 2017-11-03 RX ADMIN — IOPAMIDOL 80 ML: 612 INJECTION, SOLUTION INTRAVENOUS at 13:00

## 2017-11-14 DIAGNOSIS — J61 ASBESTOSIS (HCC): Primary | ICD-10-CM

## 2017-11-14 NOTE — PROGRESS NOTES
Verbal Order with read back per Dr. Hosea Ashby MD  For PFT smart panel. AMB POC PFT complete w/ bronchodilator  AMB POC PFT complete w/o bronchodilator  Gas Dilute/ wash out lung vol w/wo distrib vet & vol  Diffusing capacity    Dr. Hosea Ashby MD will co-sign the orders.

## 2017-11-20 RX ORDER — TAMSULOSIN HYDROCHLORIDE 0.4 MG/1
CAPSULE ORAL
Qty: 30 CAP | Refills: 3 | Status: SHIPPED | OUTPATIENT
Start: 2017-11-20 | End: 2018-03-19 | Stop reason: SDUPTHER

## 2017-11-22 ENCOUNTER — OFFICE VISIT (OUTPATIENT)
Dept: PULMONOLOGY | Age: 67
End: 2017-11-22

## 2017-11-22 VITALS
OXYGEN SATURATION: 98 % | RESPIRATION RATE: 21 BRPM | TEMPERATURE: 98.2 F | BODY MASS INDEX: 28.98 KG/M2 | SYSTOLIC BLOOD PRESSURE: 130 MMHG | WEIGHT: 207 LBS | HEART RATE: 63 BPM | HEIGHT: 71 IN | DIASTOLIC BLOOD PRESSURE: 80 MMHG

## 2017-11-22 DIAGNOSIS — J61 ASBESTOSIS (HCC): ICD-10-CM

## 2017-11-22 DIAGNOSIS — R91.8 MULTIPLE PULMONARY NODULES: Primary | ICD-10-CM

## 2017-11-22 NOTE — PATIENT INSTRUCTIONS
Consider regular exercise in the form of walking. Walking should be without stopping and you should do this at least 4 times a week.   Begin walking about 5-10 minutes without stopping and gradually increase the time you walk over several weeks to months until you are walking approximately half an hour without stopping    Always call if there is any concern about worsening shortness of breath

## 2017-11-22 NOTE — PROGRESS NOTES
[facility]  Pulmonary, Critical Care, and Sleep Medicine    Dear Idalmis Samano,    Chief complaint:  Evaluation for pulmonary nodules    HPI:  Franklin Hernandez. is 78years old and comes to the office today accompanied by his wife at your request concerning an abnormal CT of the chest.  The patient relates he participated in a screening for asbestosis and was noted to have a chest x-ray abnormality and a CT was obtained. The patient relates she does have shortness of breath with exertion but he has to be activity like rushing and basically can do most of the daily activities of living without shortness of breath including activities like carrying groceries. He also reports chest pain which is substernal nonradiating it can last for days or even a few hours and resolved spontaneously and is not related to activity can occur with heavy activity or even sitting around watching TV. He also reports a chronic cough productive of clear and discolored mucus but no blood and denies any leg swelling or PND. He also does not seem to have any symptoms of sleep apnea with no witnessed apnea no startling reactions were daytime sleepiness. This  Allergies   Allergen Reactions    Neurontin [Gabapentin] Other (comments)     confusion    Topamax [Topiramate] Other (comments)     confusion     Current Outpatient Prescriptions   Medication Sig    tamsulosin (FLOMAX) 0.4 mg capsule take 1 capsule by mouth once daily    zolpidem (AMBIEN) 10 mg tablet     finasteride (PROSCAR) 5 mg tablet take 1 tablet by mouth once daily    quinapril (ACCUPRIL) 20 mg tablet Take 1 Tab by mouth nightly.  fenofibrate nanocrystallized (TRICOR) 145 mg tablet 1 qd    naproxen sodium (ALEVE) 220 mg tablet Take 220 mg by mouth two (2) times daily as needed.  loratadine (CLARITIN) 10 mg tablet Take 10 mg by mouth daily as needed.  citalopram (CELEXA) 20 mg tablet Take 1 Tab by mouth daily.     esomeprazole (NEXIUM) 40 mg capsule Take 1 Cap by mouth daily.  FOLIC ACID/MULTIVIT-MIN/LUTEIN (CENTRUM SILVER PO) Take  by mouth.  cyclobenzaprine (FLEXERIL) 10 mg tablet Take  by mouth three (3) times daily as needed for Muscle Spasm(s).  pioglitazone (ACTOS) 30 mg tablet 1 qd    atorvastatin (LIPITOR) 40 mg tablet Take 1 Tab by mouth daily.  hydrocodone-acetaminophen (LORTAB)  mg per tablet Take 1 Tab by mouth. No current facility-administered medications for this visit. Past Medical History:   Diagnosis Date    [facility]bestosis ([facility] [address]     Bilateral shoulder pain     Chronic airway obstruction, not elsewhere classified     Depressive disorder, not elsewhere classified     Diabetes mellitus ([facility] [address]     Esophagitis, unspecified     Generalized osteoarthrosis, involving multiple sites     GERD (gastroesophageal reflux disease)     Headache(784.0)     Hematuria     neg cysto    Hypercholesterolemia     Hypercholesterolemia     Hypertension     Mixed hyperlipidemia     Neck pain     Pancreatitis 6/2014    Rheumatic fever     w/o sequelae    Type II or unspecified type diabetes mellitus without mention of complication, not stated as uncontrolled     Unspecified hyperplasia of prostate with urinary obstruction and other lower urinary tract symptoms (LUTS)    He denies a history of coronary artery disease liver disease ulcers tuberculosis cancer asthma or emphysema  Past Surgical History:   Procedure Laterality Date    HX APPENDECTOMY      HX COLONOSCOPY  7/26/2013    diverticulosis    HX OPEN REDUCTION INTERNAL FIXATION Right     clavicle fx       Family history: Cancer       Social History: Smokes cigarettes off and on for 35 years and no smoking for 15 years    Occupational history: Has been employed in [employer] since 1972 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 After the first 6 years his asbestos exposure was much less but was noted at least 3 or 4 times    Review of systems:  He denies syncope focal muscle weakness or numbness trouble seeing chronic abdominal pain melena or blood in his stools dysuria rashes fever chills poor appetite or weight loss.   He reports chronic hearing disability chronic hematuria which is followed by urology chronic arthritis symptoms including back pain and difficulty with swallowing where he will choke on food or even saliva occasionally    Physical Exam:  Visit Vitals    /80 (BP 1 Location: Left arm, BP Patient Position: At rest)    Pulse 63    Temp 98.2 °F (36.8 °C) (Oral)    Resp 21    Ht 5' 11\" (1.803 m)    Wt 93.9 kg (207 lb)    SpO2 98%    BMI 28.87 kg/m2     Well-developed well-nourished  HEENT: pupils equal, reactive, sclera, non-icteric   Oropharynx tongue: normal markedly decreased gag reflex  Neck: Supple   Lymph Nodes: Supra clavicular and cervical nodes, negative   Chest: Equal symmetrical expansion, no dullness, no wheezes, rales or rubs   Heart: Regular, rhythm without linda or murmur no carotid bruits  Abdomen: soft, non-tender no masses or organomegaly   Extremities: no cyanosis, clubbing, no edema no calf tenderness  Musculoskeletal: No acute joint inflammation or muscle wasting  Skin: No rash   Neurological: alert, oriented, moves all extremities      LABS:  O2 sat room air at rest 98% and with walking over 800 feet O2 sat remained at 97%  CT of the chest 11/3/17 personally reviewed, normal lung parenchyma no pleural abnormalities noted and multiple calcified pulmonary nodules as well as hilar and mediastinal calcifications  Pulmonary function tests 11/22/17 mild restrictive lung defect with a diffusion capacity of 71%     Impression:   CT abnormality: Characteristic for inactive granulomatous disease with no evidence of activity at this time and no findings for malignancy. Shortness of breath and mildly reduced vital capacity and diffusion of unknown origin and not apparently related to pulmonary fibrosis from evaluation by CT imaging . Incidental findings of decreased gag reflex  Bronchitis by history of chronic purulent mucus production  Plan:  The patient was reassured concerning his CT findings  He was given a modest strategy to exercise follow-up in 1 year with chest x-ray instead of CT for surveillance concerning granulomatous disease and consideration for repeat lung volumes and diffusion if clinically indicated  Refer back to Dr. Luo Colon concerning bronchitis    Thanks for allowing me to share in this patient's evaluation    Sincerely,    Charissa Ness MD , [facility]    CC: April Lama MD    [address] [facility]  [place], [address] [facility] [address]     P: [phone]     F: [phone]

## 2017-11-22 NOTE — PROGRESS NOTES
Patient walked in hallway for over 770 ft. Lowest sat of 93% after 330 ft and increases to 98% while still walking.

## 2017-11-28 NOTE — TELEPHONE ENCOUNTER
Late Entry 10/19/2017: Prior Auth request received from Pharmacy for Nexium. PA initiated on [email or web address]. Approval received from insurance. Pharmacy faxed insurance decision.

## 2018-01-08 ENCOUNTER — TELEPHONE (OUTPATIENT)
Dept: INTERNAL MEDICINE CLINIC | Age: 68
End: 2018-01-08

## 2018-01-08 NOTE — TELEPHONE ENCOUNTER
Called and spoke with patient's wife and told her that as along as patient blood sugar stays between 140-180. Dr Toño García doesn't want to increase blood sugar medication.

## 2018-01-16 ENCOUNTER — APPOINTMENT (OUTPATIENT)
Dept: GENERAL RADIOLOGY | Age: 68
End: 2018-01-16
Attending: UROLOGY
Payer: COMMERCIAL

## 2018-01-16 ENCOUNTER — OFFICE VISIT (OUTPATIENT)
Dept: UROLOGY | Age: 68
End: 2018-01-16

## 2018-01-16 ENCOUNTER — ANESTHESIA (OUTPATIENT)
Dept: SURGERY | Age: 68
End: 2018-01-16
Payer: COMMERCIAL

## 2018-01-16 ENCOUNTER — HOSPITAL ENCOUNTER (OUTPATIENT)
Age: 68
Setting detail: OUTPATIENT SURGERY
Discharge: HOME | End: 2018-01-16
Attending: UROLOGY | Admitting: UROLOGY
Payer: COMMERCIAL

## 2018-01-16 ENCOUNTER — ANESTHESIA EVENT (OUTPATIENT)
Dept: SURGERY | Age: 68
End: 2018-01-16
Payer: COMMERCIAL

## 2018-01-16 VITALS
OXYGEN SATURATION: 96 % | WEIGHT: 202 LBS | SYSTOLIC BLOOD PRESSURE: 130 MMHG | TEMPERATURE: 98.4 F | HEIGHT: 70 IN | HEART RATE: 87 BPM | BODY MASS INDEX: 28.92 KG/M2 | DIASTOLIC BLOOD PRESSURE: 90 MMHG

## 2018-01-16 VITALS
SYSTOLIC BLOOD PRESSURE: 120 MMHG | TEMPERATURE: 98.1 F | HEART RATE: 100 BPM | BODY MASS INDEX: 29.08 KG/M2 | WEIGHT: 203.13 LBS | RESPIRATION RATE: 14 BRPM | DIASTOLIC BLOOD PRESSURE: 62 MMHG | HEIGHT: 70 IN | OXYGEN SATURATION: 95 %

## 2018-01-16 DIAGNOSIS — N20.0 KIDNEY STONE ON RIGHT SIDE: Primary | ICD-10-CM

## 2018-01-16 DIAGNOSIS — N20.0 KIDNEY STONE: Primary | ICD-10-CM

## 2018-01-16 DIAGNOSIS — N40.1 BPH WITH OBSTRUCTION/LOWER URINARY TRACT SYMPTOMS: ICD-10-CM

## 2018-01-16 DIAGNOSIS — N13.8 BPH WITH OBSTRUCTION/LOWER URINARY TRACT SYMPTOMS: ICD-10-CM

## 2018-01-16 PROBLEM — E11.21 TYPE 2 DIABETES MELLITUS WITH NEPHROPATHY (HCC): Status: ACTIVE | Noted: 2018-01-16

## 2018-01-16 PROBLEM — F33.9 RECURRENT DEPRESSION (HCC): Status: ACTIVE | Noted: 2018-01-16

## 2018-01-16 LAB
BILIRUB UR QL STRIP: NEGATIVE
GLUCOSE BLD STRIP.AUTO-MCNC: 102 MG/DL (ref 70–110)
GLUCOSE UR-MCNC: NEGATIVE MG/DL
KETONES P FAST UR STRIP-MCNC: NEGATIVE MG/DL
PH UR STRIP: 5.5 [PH] (ref 4.6–8)
PROT UR QL STRIP: NEGATIVE
SP GR UR STRIP: 1.01 (ref 1–1.03)
UA UROBILINOGEN AMB POC: NORMAL (ref 0.2–1)
URINALYSIS CLARITY POC: CLEAR
URINALYSIS COLOR POC: YELLOW
URINE BLOOD POC: NEGATIVE
URINE LEUKOCYTES POC: NORMAL
URINE NITRITES POC: NEGATIVE

## 2018-01-16 PROCEDURE — C1894 INTRO/SHEATH, NON-LASER: HCPCS | Performed by: UROLOGY

## 2018-01-16 PROCEDURE — C1769 GUIDE WIRE: HCPCS | Performed by: UROLOGY

## 2018-01-16 PROCEDURE — 74420 UROGRAPHY RTRGR +-KUB: CPT

## 2018-01-16 PROCEDURE — 74011250636 HC RX REV CODE- 250/636: Performed by: ANESTHESIOLOGY

## 2018-01-16 PROCEDURE — 74011250636 HC RX REV CODE- 250/636

## 2018-01-16 PROCEDURE — 74011250637 HC RX REV CODE- 250/637: Performed by: UROLOGY

## 2018-01-16 PROCEDURE — 74011000250 HC RX REV CODE- 250

## 2018-01-16 PROCEDURE — 74011000258 HC RX REV CODE- 258

## 2018-01-16 PROCEDURE — 77030005520 HC CATH URETH FOL38 BARD -A: Performed by: UROLOGY

## 2018-01-16 PROCEDURE — 77030020782 HC GWN BAIR PAWS FLX 3M -B: Performed by: UROLOGY

## 2018-01-16 PROCEDURE — 76060000033 HC ANESTHESIA 1 TO 1.5 HR: Performed by: UROLOGY

## 2018-01-16 PROCEDURE — 77030012961 HC IRR KT CYSTO/TUR ICUM -A: Performed by: UROLOGY

## 2018-01-16 PROCEDURE — 77030006974 HC BSKT URET RTVR BSC -C: Performed by: UROLOGY

## 2018-01-16 PROCEDURE — 74011250636 HC RX REV CODE- 250/636: Performed by: NURSE ANESTHETIST, CERTIFIED REGISTERED

## 2018-01-16 PROCEDURE — 77030018836 HC SOL IRR NACL ICUM -A: Performed by: UROLOGY

## 2018-01-16 PROCEDURE — 76010000149 HC OR TIME 1 TO 1.5 HR: Performed by: UROLOGY

## 2018-01-16 PROCEDURE — C2617 STENT, NON-COR, TEM W/O DEL: HCPCS | Performed by: UROLOGY

## 2018-01-16 PROCEDURE — 76210000017 HC OR PH I REC 1.5 TO 2 HR: Performed by: UROLOGY

## 2018-01-16 PROCEDURE — 74011000250 HC RX REV CODE- 250: Performed by: ANESTHESIOLOGY

## 2018-01-16 PROCEDURE — C1726 CATH, BAL DIL, NON-VASCULAR: HCPCS | Performed by: UROLOGY

## 2018-01-16 PROCEDURE — 76210000021 HC REC RM PH II 0.5 TO 1 HR: Performed by: UROLOGY

## 2018-01-16 PROCEDURE — 82962 GLUCOSE BLOOD TEST: CPT

## 2018-01-16 PROCEDURE — 77030012863 HC BG URIN LEG HOLL -A: Performed by: UROLOGY

## 2018-01-16 PROCEDURE — 93005 ELECTROCARDIOGRAM TRACING: CPT

## 2018-01-16 DEVICE — URETERAL STENT
Type: IMPLANTABLE DEVICE | Status: FUNCTIONAL
Brand: POLARIS™ ULTRA

## 2018-01-16 RX ORDER — SODIUM CHLORIDE, SODIUM LACTATE, POTASSIUM CHLORIDE, CALCIUM CHLORIDE 600; 310; 30; 20 MG/100ML; MG/100ML; MG/100ML; MG/100ML
125 INJECTION, SOLUTION INTRAVENOUS CONTINUOUS
Status: DISCONTINUED | OUTPATIENT
Start: 2018-01-16 | End: 2018-01-16 | Stop reason: HOSPADM

## 2018-01-16 RX ORDER — HYDROMORPHONE HYDROCHLORIDE 2 MG/ML
0.5 INJECTION, SOLUTION INTRAMUSCULAR; INTRAVENOUS; SUBCUTANEOUS ONCE
Status: DISCONTINUED | OUTPATIENT
Start: 2018-01-16 | End: 2018-01-16 | Stop reason: HOSPADM

## 2018-01-16 RX ORDER — HYDROMORPHONE HYDROCHLORIDE 1 MG/ML
0.5 INJECTION, SOLUTION INTRAMUSCULAR; INTRAVENOUS; SUBCUTANEOUS
Status: COMPLETED | OUTPATIENT
Start: 2018-01-16 | End: 2018-01-16

## 2018-01-16 RX ORDER — OXYCODONE HYDROCHLORIDE 5 MG/1
5 TABLET ORAL
Qty: 15 TAB | Refills: 0 | Status: SHIPPED | OUTPATIENT
Start: 2018-01-16 | End: 2018-05-16

## 2018-01-16 RX ORDER — ONDANSETRON 2 MG/ML
INJECTION INTRAMUSCULAR; INTRAVENOUS AS NEEDED
Status: DISCONTINUED | OUTPATIENT
Start: 2018-01-16 | End: 2018-01-16 | Stop reason: HOSPADM

## 2018-01-16 RX ORDER — DEXTROSE 50 % IN WATER (D50W) INTRAVENOUS SYRINGE
25-50 AS NEEDED
Status: DISCONTINUED | OUTPATIENT
Start: 2018-01-16 | End: 2018-01-17 | Stop reason: HOSPADM

## 2018-01-16 RX ORDER — CIPROFLOXACIN 250 MG/1
250 TABLET, FILM COATED ORAL EVERY 12 HOURS
Qty: 10 TAB | Refills: 0 | Status: SHIPPED | OUTPATIENT
Start: 2018-01-16 | End: 2018-01-21

## 2018-01-16 RX ORDER — MIDAZOLAM HYDROCHLORIDE 1 MG/ML
INJECTION, SOLUTION INTRAMUSCULAR; INTRAVENOUS AS NEEDED
Status: DISCONTINUED | OUTPATIENT
Start: 2018-01-16 | End: 2018-01-16 | Stop reason: HOSPADM

## 2018-01-16 RX ORDER — PROPOFOL 10 MG/ML
INJECTION, EMULSION INTRAVENOUS AS NEEDED
Status: DISCONTINUED | OUTPATIENT
Start: 2018-01-16 | End: 2018-01-16 | Stop reason: HOSPADM

## 2018-01-16 RX ORDER — DEXTROSE MONOHYDRATE AND SODIUM CHLORIDE 5; .225 G/100ML; G/100ML
50 INJECTION, SOLUTION INTRAVENOUS CONTINUOUS
Status: DISCONTINUED | OUTPATIENT
Start: 2018-01-16 | End: 2018-01-16

## 2018-01-16 RX ORDER — METOPROLOL TARTRATE 5 MG/5ML
INJECTION INTRAVENOUS
Status: COMPLETED
Start: 2018-01-16 | End: 2018-01-16

## 2018-01-16 RX ORDER — SODIUM CHLORIDE 0.9 % (FLUSH) 0.9 %
5-10 SYRINGE (ML) INJECTION AS NEEDED
Status: DISCONTINUED | OUTPATIENT
Start: 2018-01-16 | End: 2018-01-17 | Stop reason: HOSPADM

## 2018-01-16 RX ORDER — FENTANYL CITRATE 50 UG/ML
INJECTION, SOLUTION INTRAMUSCULAR; INTRAVENOUS AS NEEDED
Status: DISCONTINUED | OUTPATIENT
Start: 2018-01-16 | End: 2018-01-16 | Stop reason: HOSPADM

## 2018-01-16 RX ORDER — EPHEDRINE SULFATE 50 MG/ML
INJECTION, SOLUTION INTRAVENOUS AS NEEDED
Status: DISCONTINUED | OUTPATIENT
Start: 2018-01-16 | End: 2018-01-16 | Stop reason: HOSPADM

## 2018-01-16 RX ORDER — FENTANYL CITRATE 50 UG/ML
INJECTION, SOLUTION INTRAMUSCULAR; INTRAVENOUS AS NEEDED
Status: DISCONTINUED | OUTPATIENT
Start: 2018-01-16 | End: 2018-01-16

## 2018-01-16 RX ORDER — LIDOCAINE HYDROCHLORIDE 20 MG/ML
INJECTION, SOLUTION EPIDURAL; INFILTRATION; INTRACAUDAL; PERINEURAL AS NEEDED
Status: DISCONTINUED | OUTPATIENT
Start: 2018-01-16 | End: 2018-01-16 | Stop reason: HOSPADM

## 2018-01-16 RX ORDER — MAGNESIUM SULFATE 100 %
4 CRYSTALS MISCELLANEOUS AS NEEDED
Status: DISCONTINUED | OUTPATIENT
Start: 2018-01-16 | End: 2018-01-17 | Stop reason: HOSPADM

## 2018-01-16 RX ORDER — METOPROLOL TARTRATE 5 MG/5ML
2.5 INJECTION INTRAVENOUS ONCE
Status: COMPLETED | OUTPATIENT
Start: 2018-01-16 | End: 2018-01-16

## 2018-01-16 RX ORDER — CEFAZOLIN SODIUM 2 G/50ML
2 SOLUTION INTRAVENOUS
Status: DISCONTINUED | OUTPATIENT
Start: 2018-01-16 | End: 2018-01-17 | Stop reason: HOSPADM

## 2018-01-16 RX ORDER — HYDROMORPHONE HYDROCHLORIDE 2 MG/ML
INJECTION, SOLUTION INTRAMUSCULAR; INTRAVENOUS; SUBCUTANEOUS
Status: DISCONTINUED
Start: 2018-01-16 | End: 2018-01-17 | Stop reason: HOSPADM

## 2018-01-16 RX ORDER — PHENAZOPYRIDINE HYDROCHLORIDE 100 MG/1
200 TABLET, FILM COATED ORAL ONCE
Status: COMPLETED | OUTPATIENT
Start: 2018-01-16 | End: 2018-01-16

## 2018-01-16 RX ORDER — PHENAZOPYRIDINE HYDROCHLORIDE 200 MG/1
200 TABLET, FILM COATED ORAL
Qty: 30 TAB | Refills: 3 | Status: SHIPPED | OUTPATIENT
Start: 2018-01-16 | End: 2018-01-19

## 2018-01-16 RX ORDER — CEFAZOLIN SODIUM IN 0.9 % NACL 2 G/100 ML
PLASTIC BAG, INJECTION (ML) INTRAVENOUS AS NEEDED
Status: DISCONTINUED | OUTPATIENT
Start: 2018-01-16 | End: 2018-01-16 | Stop reason: HOSPADM

## 2018-01-16 RX ORDER — FENTANYL CITRATE 50 UG/ML
25 INJECTION, SOLUTION INTRAMUSCULAR; INTRAVENOUS AS NEEDED
Status: DISCONTINUED | OUTPATIENT
Start: 2018-01-16 | End: 2018-01-17 | Stop reason: HOSPADM

## 2018-01-16 RX ORDER — INSULIN LISPRO 100 [IU]/ML
INJECTION, SOLUTION INTRAVENOUS; SUBCUTANEOUS ONCE
Status: DISCONTINUED | OUTPATIENT
Start: 2018-01-16 | End: 2018-01-17 | Stop reason: HOSPADM

## 2018-01-16 RX ORDER — DOCUSATE SODIUM 100 MG/1
100 CAPSULE, LIQUID FILLED ORAL 2 TIMES DAILY
Qty: 10 CAP | Refills: 0 | Status: SHIPPED | OUTPATIENT
Start: 2018-01-16 | End: 2018-01-21

## 2018-01-16 RX ORDER — SODIUM CHLORIDE, SODIUM LACTATE, POTASSIUM CHLORIDE, CALCIUM CHLORIDE 600; 310; 30; 20 MG/100ML; MG/100ML; MG/100ML; MG/100ML
INJECTION, SOLUTION INTRAVENOUS
Status: DISCONTINUED | OUTPATIENT
Start: 2018-01-16 | End: 2018-01-16 | Stop reason: HOSPADM

## 2018-01-16 RX ORDER — SODIUM CHLORIDE 9 MG/ML
100 INJECTION, SOLUTION INTRAVENOUS CONTINUOUS
Status: CANCELLED | OUTPATIENT
Start: 2018-01-16

## 2018-01-16 RX ORDER — PHENAZOPYRIDINE HYDROCHLORIDE 100 MG/1
200 TABLET, FILM COATED ORAL ONCE
Status: DISCONTINUED | OUTPATIENT
Start: 2018-01-16 | End: 2018-01-16

## 2018-01-16 RX ADMIN — HYDROMORPHONE HYDROCHLORIDE 0.5 MG: 1 INJECTION, SOLUTION INTRAMUSCULAR; INTRAVENOUS; SUBCUTANEOUS at 20:00

## 2018-01-16 RX ADMIN — METOPROLOL TARTRATE 2.5 MG: 5 INJECTION INTRAVENOUS at 20:55

## 2018-01-16 RX ADMIN — LIDOCAINE HYDROCHLORIDE 40 MG: 20 INJECTION, SOLUTION EPIDURAL; INFILTRATION; INTRACAUDAL; PERINEURAL at 18:19

## 2018-01-16 RX ADMIN — FENTANYL CITRATE 50 MCG: 50 INJECTION, SOLUTION INTRAMUSCULAR; INTRAVENOUS at 19:19

## 2018-01-16 RX ADMIN — FENTANYL CITRATE 50 MCG: 50 INJECTION, SOLUTION INTRAMUSCULAR; INTRAVENOUS at 19:08

## 2018-01-16 RX ADMIN — Medication 2 G: at 18:20

## 2018-01-16 RX ADMIN — FENTANYL CITRATE 50 MCG: 50 INJECTION, SOLUTION INTRAMUSCULAR; INTRAVENOUS at 18:38

## 2018-01-16 RX ADMIN — EPHEDRINE SULFATE 10 MG: 50 INJECTION, SOLUTION INTRAVENOUS at 18:33

## 2018-01-16 RX ADMIN — MIDAZOLAM HYDROCHLORIDE 2 MG: 1 INJECTION, SOLUTION INTRAMUSCULAR; INTRAVENOUS at 18:14

## 2018-01-16 RX ADMIN — FENTANYL CITRATE 50 MCG: 50 INJECTION, SOLUTION INTRAMUSCULAR; INTRAVENOUS at 18:14

## 2018-01-16 RX ADMIN — PHENAZOPYRIDINE HYDROCHLORIDE 200 MG: 100 TABLET ORAL at 20:20

## 2018-01-16 RX ADMIN — FENTANYL CITRATE 25 MCG: 50 INJECTION INTRAMUSCULAR; INTRAVENOUS at 19:55

## 2018-01-16 RX ADMIN — METOPROLOL TARTRATE 2.5 MG: 5 INJECTION, SOLUTION INTRAVENOUS at 20:55

## 2018-01-16 RX ADMIN — FENTANYL CITRATE 25 MCG: 50 INJECTION INTRAMUSCULAR; INTRAVENOUS at 19:40

## 2018-01-16 RX ADMIN — FAMOTIDINE 20 MG: 10 INJECTION, SOLUTION INTRAVENOUS at 17:45

## 2018-01-16 RX ADMIN — SODIUM CHLORIDE, SODIUM LACTATE, POTASSIUM CHLORIDE, AND CALCIUM CHLORIDE 125 ML/HR: 600; 310; 30; 20 INJECTION, SOLUTION INTRAVENOUS at 17:45

## 2018-01-16 RX ADMIN — FENTANYL CITRATE 25 MCG: 50 INJECTION INTRAMUSCULAR; INTRAVENOUS at 19:45

## 2018-01-16 RX ADMIN — PROPOFOL 200 MG: 10 INJECTION, EMULSION INTRAVENOUS at 18:19

## 2018-01-16 RX ADMIN — HYDROMORPHONE HYDROCHLORIDE 0.5 MG: 1 INJECTION, SOLUTION INTRAMUSCULAR; INTRAVENOUS; SUBCUTANEOUS at 20:10

## 2018-01-16 RX ADMIN — ONDANSETRON 4 MG: 2 INJECTION INTRAMUSCULAR; INTRAVENOUS at 18:42

## 2018-01-16 RX ADMIN — SODIUM CHLORIDE, SODIUM LACTATE, POTASSIUM CHLORIDE, CALCIUM CHLORIDE: 600; 310; 30; 20 INJECTION, SOLUTION INTRAVENOUS at 18:14

## 2018-01-16 NOTE — MR AVS SNAPSHOT
[address] [facility] [address] [address] 
[phone] Patient: Anjel Lozada. MRN: [id number]:[id number] Visit Information Date & Time Provider Department Dept. Phone Encounter #  
 1/16/2018 10:30 AM [facility]iela [facility] [id number] Your Appointments 4/26/2018 10:45 AM  
ULTRASOUND with Manolo Castro MD  
[facility]: PVR/PSA  
 [address] [address]  
[phone] [address] [facility] [address] Upcoming Health Maintenance Date Due Hepatitis C Screening 1950 DTaP/Tdap/Td series (1 - Tdap) 6/3/1972 ZOSTER VACCINE AGE 60> 4/3/2011 MEDICARE YEARLY EXAM 6/3/2016 EYE EXAM RETINAL OR DILATED Q1 5/4/2017 FOOT EXAM Q1 11/16/2017 HEMOGLOBIN A1C Q6M 4/30/2018 GLAUCOMA SCREENING Q2Y 5/4/2018 MICROALBUMIN Q1 10/31/2018 LIPID PANEL Q1 10/31/2018 COLONOSCOPY 7/1/2023 Allergies as of 1/16/2018  Review Complete On: 1/16/2018 By: Odilia Quach Severity Noted Reaction Type Reactions Neurontin [Gabapentin]  08/18/2015    Other (comments)  
 confusion Topamax [Topiramate]  08/18/2015    Other (comments)  
 confusion Current Immunizations  Reviewed on 11/17/2015 Name Date Influenza High Dose Vaccine PF 10/31/2017, 10/30/2016 Influenza Vaccine PF 11/17/2015 Pneumococcal Conjugate (PCV-13) 11/16/2016 Pneumococcal Polysaccharide (PPSV-23) 10/31/2017 Not reviewed this visit You Were Diagnosed With   
  
 Codes Comments Kidney stone    -  Primary ICD-10-CM: N20.0 ICD-9-CM: 592.0 Vitals BP Pulse Temp Height(growth percentile) Weight(growth percentile) SpO2  
 130/90 (BP 1 Location: Left arm, BP Patient Position: Sitting) 87 98.4 °F (36.9 °C) (Oral) 5' 10\" (1.778 m) 202 lb (91.6 kg) 96% BMI Smoking Status 28.98 kg/m2 Former Smoker Vitals History BMI and BSA Data Body Mass Index Body Surface Area  
 28.98 kg/m 2 2.13 m 2 Preferred Pharmacy Pharmacy Name Phone [id number] Julia Rushing [phone] Your Updated Medication List  
  
   
This list is accurate as of: 1/16/18 10:39 AM.  Always use your most recent med list.  
  
  
  
  
 ALEVE 220 mg tablet Generic drug:  naproxen sodium Take 220 mg by mouth two (2) times daily as needed. atorvastatin 40 mg tablet Commonly known as:  LIPITOR Take 1 Tab by mouth daily. CENTRUM SILVER PO Take  by mouth.  
  
 citalopram 20 mg tablet Commonly known as:  Garrick Bon Take 1 Tab by mouth daily. esomeprazole 40 mg capsule Commonly known as:  Garth Earnest Take 1 Cap by mouth daily. fenofibrate nanocrystallized 145 mg tablet Commonly known as:  TRICOR  
1 qd  
  
 finasteride 5 mg tablet Commonly known as:  PROSCAR  
take 1 tablet by mouth once daily FLEXERIL 10 mg tablet Generic drug:  cyclobenzaprine Take  by mouth three (3) times daily as needed for Muscle Spasm(s). HYDROcodone-acetaminophen  mg tablet Commonly known as:  Guadlupe Elms Take 1 Tab by mouth.  
  
 loratadine 10 mg tablet Commonly known as:  [employer] Take 10 mg by mouth daily as needed. pioglitazone 30 mg tablet Commonly known as:  ACTOS  
1 qd  
  
 quinapril 20 mg tablet Commonly known as:  ACCUPRIL Take 1 Tab by mouth nightly. tamsulosin 0.4 mg capsule Commonly known as:  FLOMAX  
take 1 capsule by mouth once daily  
  
 zolpidem 10 mg tablet Commonly known as:  AMBIEN We Performed the Following AMB POC URINALYSIS DIP STICK AUTO W/O MICRO [82455 CPT(R)] Patient Instructions Learning About Diet for Kidney Stone Prevention What are kidney stones? Kidney stones are made of salts and minerals in the urine that form small \"lalo. \" Stones can form in the kidneys and the ureters (the tubes that lead from the kidneys to the bladder). They can also form in the bladder. Stones may not cause a problem as long as they stay in the kidneys. But they can cause sudden, severe pain. Pain is most likely when the stones travel from the kidneys to the bladder. Kidney stones can cause bloody urine. Kidney stones often run in families. You are more likely to get them if you don't drink enough fluids, mainly water. Certain foods and drinks and some dietary supplements may also increase your risk for kidney stones if you consume too much of them. What can you do to prevent kidney stones? Changing what you eat may not prevent all types of kidney stones. But for people who have a history of certain kinds of kidney stones, some changes in diet may help. A dietitian can help you set up a meal plan that includes healthy, low-oxalate choices. Here are some general guidelines to get you started. Plan your meals and snacks around foods that are low in oxalate. These foods include: · Corn, kale, parsnips, and squash,. · Beef, chicken, pork, turkey, and fish. · Milk, butter, cheese, and yogurt. You can eat certain foods that are medium-high in oxalate, but eat them only once in a while. These foods include: · Bread. · Brown rice. · English muffins. · Figs. · Popcorn. · String beans. · Tomatoes. Limit very high-oxalate foods, including: · Black tea. · Coffee. · Chocolate. · Dark green vegetables. · Nuts. Here are some other things you can do to help prevent kidney stones. · Drink plenty of fluids. If you have kidney, heart, or liver disease and have to limit fluids, talk with your doctor before you increase the amount of fluids you drink. · Do not take more than the recommended daily dose of vitamins C and D. 
· Limit the salt in your diet. · Eat a balanced diet that is not too high in protein. Follow-up care is a key part of your treatment and safety. Be sure to make and go to all appointments, and call your doctor if you are having problems. It's also a good idea to know your test results and keep a list of the medicines you take. Where can you learn more? Go to http://jhonatan-mayda.info/. Enter C138 in the search box to learn more about \"Learning About Diet for Kidney Stone Prevention. \" Current as of: May 12, 2017 Content Version: 11.4 © 0912-3938 [employer]. Care instructions adapted under license by [employer] (which disclaims liability or warranty for this information). If you have questions about a medical condition or this instruction, always ask your healthcare professional. [address] any warranty or liability for your use of this information. Introducing [facility]! Lex Gutierrez introduces [employer] patient portal. Now you can access parts of your medical record, email your doctor's office, and request medication refills online. 1. In your internet browser, go to [email or web address] 2. Click on the First Time User? Click Here link in the Sign In box. You will see the New Member Sign Up page. 3. Enter your [employer] Access Code exactly as it appears below. You will not need to use this code after youve completed the sign-up process. If you do not sign up before the expiration date, you must request a new code. · [employer] Access Code: [email or web address] Expires: 1/24/2018  9:31 AM 
 
4. Enter the last four digits of your Social Security Number (xxxx) and Date of Birth (mm/dd/yyyy) as indicated and click Submit. You will be taken to the next sign-up page. 5. Create a [employer] ID. This will be your [employer] login ID and cannot be changed, so think of one that is secure and easy to remember. 6. Create a Websand password. You can change your password at any time. 7. Enter your Password Reset Question and Answer. This can be used at a later time if you forget your password. 8. Enter your e-mail address. You will receive e-mail notification when new information is available in [address] 9. Click Sign Up. You can now view and download portions of your medical record. 10. Click the Download Summary menu link to download a portable copy of your medical information. If you have questions, please visit the Frequently Asked Questions section of the Websand website. Remember, Websand is NOT to be used for urgent needs. For medical emergencies, dial 911. Now available from your iPhone and Android! Please provide this summary of care documentation to your next provider. Your primary care clinician is listed as Sherin Dumont. If you have any questions after today's visit, please call [phone].

## 2018-01-16 NOTE — ANESTHESIA PREPROCEDURE EVALUATION
Anesthetic History   No history of anesthetic complications            Review of Systems / Medical History  Patient summary reviewed, nursing notes reviewed and pertinent labs reviewed    Pulmonary    COPD: moderate               Neuro/Psych   Within defined limits           Cardiovascular    Hypertension: well controlled              Exercise tolerance: >4 METS     GI/Hepatic/Renal     GERD: well controlled           Endo/Other    Diabetes: well controlled, type 2    Arthritis     Other Findings   Comments: Risk Factors for Postoperative nausea/vomiting:       History of postoperative nausea/vomiting? NO       Female? NO       Motion sickness? NO       Intended opioid administration for postoperative analgesia? NO      Smoking Abstinence  Current Smoker? NO  Elective Surgery? NO  Seen preoperatively by anesthesiologist or proxy prior to day of surgery? YES  Pt abstained from smoking 24 hours prior to anesthesia?  N/A           Physical Exam    Airway  Mallampati: III  TM Distance: 4 - 6 cm  Neck ROM: decreased range of motion   Mouth opening: Diminished (comment)     Cardiovascular  Regular rate and rhythm,  S1 and S2 normal,  no murmur, click, rub, or gallop             Dental    Dentition: Poor dentition     Pulmonary  Breath sounds clear to auscultation               Abdominal  GI exam deferred       Other Findings            Anesthetic Plan    ASA: 3, emergent  Anesthesia type: general          Induction: Intravenous  Anesthetic plan and risks discussed with: Patient and Family

## 2018-01-16 NOTE — PATIENT INSTRUCTIONS
Learning About Diet for Kidney Stone Prevention  What are kidney stones? Kidney stones are made of salts and minerals in the urine that form small \"lalo. \" Stones can form in the kidneys and the ureters (the tubes that lead from the kidneys to the bladder). They can also form in the bladder. Stones may not cause a problem as long as they stay in the kidneys. But they can cause sudden, severe pain. Pain is most likely when the stones travel from the kidneys to the bladder. Kidney stones can cause bloody urine. Kidney stones often run in families. You are more likely to get them if you don't drink enough fluids, mainly water. Certain foods and drinks and some dietary supplements may also increase your risk for kidney stones if you consume too much of them. What can you do to prevent kidney stones? Changing what you eat may not prevent all types of kidney stones. But for people who have a history of certain kinds of kidney stones, some changes in diet may help. A dietitian can help you set up a meal plan that includes healthy, low-oxalate choices. Here are some general guidelines to get you started. Plan your meals and snacks around foods that are low in oxalate. These foods include:  · Corn, kale, parsnips, and squash,. · Beef, chicken, pork, turkey, and fish. · Milk, butter, cheese, and yogurt. You can eat certain foods that are medium-high in oxalate, but eat them only once in a while. These foods include:  · Bread. · Brown rice. · English muffins. · Figs. · Popcorn. · String beans. · Tomatoes. Limit very high-oxalate foods, including:  · Black tea. · Coffee. · Chocolate. · Dark green vegetables. · Nuts. Here are some other things you can do to help prevent kidney stones. · Drink plenty of fluids. If you have kidney, heart, or liver disease and have to limit fluids, talk with your doctor before you increase the amount of fluids you drink.   · Do not take more than the recommended daily dose of vitamins C and D.  · Limit the salt in your diet. · Eat a balanced diet that is not too high in protein. Follow-up care is a key part of your treatment and safety. Be sure to make and go to all appointments, and call your doctor if you are having problems. It's also a good idea to know your test results and keep a list of the medicines you take. Where can you learn more? Go to http://jhonatan-mayda.info/. Enter C138 in the search box to learn more about \"Learning About Diet for Kidney Stone Prevention. \"  Current as of: May 12, 2017  Content Version: 11.4  © 6935-7986 [employer]. Care instructions adapted under license by [employer] (which disclaims liability or warranty for this information). If you have questions about a medical condition or this instruction, always ask your healthcare professional. [address] any warranty or liability for your use of this information.

## 2018-01-16 NOTE — IP AVS SNAPSHOT
[address] [address] 
[phone] Patient: Yumiko Ivan. MRN: [id number] About your hospitalization You were admitted on:  January 16, 2018 You last received care in the:  [facility] PACU You were discharged on:  January 16, 2018 Why you were hospitalized Your primary diagnosis was:  Not on File Follow-up Information Follow up With Details Comments Contact Info Follow up in 10 day(s) MD Dougie Quiroga [facility] [address] [facility] [address] 
[phone] Leoncio Jones MD Follow up in 10 day(s)  [place] [address] [place] [address] 
[phone] Your Scheduled Appointments Thursday January 25, 2018 10:30 AM EST Office Visit with Leoncio Jones MD  
[facility] [address] [address]  
[phone] Discharge Orders None A check meron indicates which time of day the medication should be taken. My Medications START taking these medications Instructions Each Dose to Equal  
 Morning Noon Evening Bedtime  
 ciprofloxacin HCl 250 mg tablet Commonly known as:  CIPRO Your last dose was: Your next dose is: Take 1 Tab by mouth every twelve (12) hours for 5 days. 250 mg  
    
   
   
   
  
 docusate sodium 100 mg capsule Commonly known as:  Eleonore Suraj Your last dose was: Your next dose is: Take 1 Cap by mouth two (2) times a day for 5 days. 100 mg  
    
   
   
   
  
 oxyCODONE IR 5 mg immediate release tablet Commonly known as:  [place] Your last dose was: Your next dose is: Take 1 Tab by mouth every four (4) hours as needed for Pain. Max Daily Amount: 30 mg.  
 5 mg  
    
   
   
   
  
 phenazopyridine 200 mg tablet Commonly known as:  PYRIDIUM  
   
 Your last dose was: Your next dose is: Take 1 Tab by mouth three (3) times daily as needed for Pain for up to 3 days. 200 mg CHANGE how you take these medications Instructions Each Dose to Equal  
 Morning Noon Evening Bedtime  
 quinapril 20 mg tablet Commonly known as:  ACCUPRIL What changed:   
- how much to take - when to take this Your last dose was: Your next dose is: Take 1 Tab by mouth nightly. 20 mg CONTINUE taking these medications Instructions Each Dose to Equal  
 Morning Noon Evening Bedtime ALEVE 220 mg tablet Generic drug:  naproxen sodium Your last dose was: Your next dose is: Take 220 mg by mouth two (2) times daily as needed. 220 mg  
    
   
   
   
  
 atorvastatin 40 mg tablet Commonly known as:  LIPITOR Your last dose was: Your next dose is: Take 1 Tab by mouth daily. 40 mg CENTRUM SILVER PO Your last dose was: Your next dose is: Take  by mouth.  
     
   
   
   
  
 citalopram 20 mg tablet Commonly known as:  Fer Contreras Your last dose was: Your next dose is: Take 1 Tab by mouth daily. 20 mg  
    
   
   
   
  
 esomeprazole 40 mg capsule Commonly known as:  Caryn Sequin Your last dose was: Your next dose is: Take 1 Cap by mouth daily. 40 mg  
    
   
   
   
  
 fenofibrate nanocrystallized 145 mg tablet Commonly known as:  [employer] Your last dose was: Your next dose is:    
   
   
 1 qd  
     
   
   
   
  
 finasteride 5 mg tablet Commonly known as:  PROSCAR Your last dose was: Your next dose is:    
   
   
 take 1 tablet by mouth once daily FLEXERIL 10 mg tablet Generic drug:  cyclobenzaprine Your last dose was: Your next dose is: Take  by mouth three (3) times daily as needed for Muscle Spasm(s). HYDROcodone-acetaminophen  mg tablet Commonly known as:  Connie Jacobo Your last dose was: Your next dose is: Take 1 Tab by mouth. 1 Tab  
    
   
   
   
  
 loratadine 10 mg tablet Commonly known as:  [employer] Your last dose was: Your next dose is: Take 10 mg by mouth daily as needed. 10 mg  
    
   
   
   
  
 pioglitazone 30 mg tablet Commonly known as:  ACTOS Your last dose was: Your next dose is:    
   
   
 1 qd  
     
   
   
   
  
 tamsulosin 0.4 mg capsule Commonly known as:  FLOMAX Your last dose was: Your next dose is:    
   
   
 take 1 capsule by mouth once daily  
     
   
   
   
  
 zolpidem 10 mg tablet Commonly known as:  AMBIEN Your last dose was: Your next dose is:    
   
   
      
   
   
   
  
  
  
Where to Get Your Medications Information on where to get these meds will be given to you by the nurse or doctor. ! Ask your nurse or doctor about these medications  
  ciprofloxacin HCl 250 mg tablet  
 docusate sodium 100 mg capsule  
 oxyCODONE IR 5 mg immediate release tablet  
 phenazopyridine 200 mg tablet Discharge Instructions Light activity Reg diet Gregory to leg bag- d/c tomorrow Rx Oxycodone, Colace, Cipro  
 
rtc 10 days Zaid Paige MD 
 
DISCHARGE SUMMARY from Nurse PATIENT INSTRUCTIONS: 
 
After general anesthesia or intravenous sedation, for 24 hours or while taking prescription Narcotics: · Limit your activities · Do not drive and operate hazardous machinery · Do not make important personal or business decisions · Do  not drink alcoholic beverages · If you have not urinated within 8 hours after discharge, please contact your surgeon on call. Report the following to your surgeon: · Excessive pain, swelling, redness or odor of or around the surgical area · Temperature over 100.5 · Nausea and vomiting lasting longer than 4 hours or if unable to take medications · Any signs of decreased circulation or nerve impairment to extremity: change in color, persistent  numbness, tingling, coldness or increase pain · Any questions What to do at Home: *  Please give a list of your current medications to your Primary Care Provider. *  Please update this list whenever your medications are discontinued, doses are 
    changed, or new medications (including over-the-counter products) are added. *  Please carry medication information at all times in case of emergency situations. These are general instructions for a healthy lifestyle: No smoking/ No tobacco products/ Avoid exposure to second hand smoke Surgeon General's Warning:  Quitting smoking now greatly reduces serious risk to your health. Obesity, smoking, and sedentary lifestyle greatly increases your risk for illness A healthy diet, regular physical exercise & weight monitoring are important for maintaining a healthy lifestyle You may be retaining fluid if you have a history of heart failure or if you experience any of the following symptoms:  Weight gain of 3 pounds or more overnight or 5 pounds in a week, increased swelling in our hands or feet or shortness of breath while lying flat in bed. Please call your doctor as soon as you notice any of these symptoms; do not wait until your next office visit. Recognize signs and symptoms of STROKE: 
 
F-face looks uneven A-arms unable to move or move unevenly S-speech slurred or non-existent T-time-call 911 as soon as signs and symptoms begin-DO NOT go Back to bed or wait to see if you get better-TIME IS BRAIN. Warning Signs of HEART ATTACK Call 911 if you have these symptoms: 
? Chest discomfort.  Most heart attacks involve discomfort in the center of the chest that lasts more than a few minutes, or that goes away and comes back. It can feel like uncomfortable pressure, squeezing, fullness, or pain. ? Discomfort in other areas of the upper body. Symptoms can include pain or discomfort in one or both arms, the back, neck, jaw, or stomach. ? Shortness of breath with or without chest discomfort. ? Other signs may include breaking out in a cold sweat, nausea, or lightheadedness. Don't wait more than five minutes to call [address]! Fast action can save your life. Calling 911 is almost always the fastest way to get lifesaving treatment. Emergency Medical Services staff can begin treatment when they arrive  up to an hour sooner than if someone gets to the hospital by car. The discharge information has been reviewed with the patient and spouse. The patient and spouse verbalized understanding. Discharge medications reviewed with the patient and spouse and appropriate educational materials and side effects teaching were provided. ___________________________________________________________________________________________________________________________________ Ureteral Stent Placement: What to Expect at Home Your Recovery A ureteral (say \"you-REE-ter-\") stent is a thin, hollow tube that is placed in the ureter to help urine pass from the kidney into the bladder. Ureters are the tubes that connect the kidneys to the bladder. You may have a small amount of blood in your urine for 1 to 3 days after the procedure. While the stent is in place, you may have to urinate more often, feel a sudden need to urinate, or feel like you cannot completely empty your bladder. You may feel some pain when you urinate or do strenuous activity. You also may notice a small amount of blood in your urine after strenuous activities. These side effects usually do not prevent people from doing their normal daily activities. You may have a thin string coming out of your urethra. Your urethra is the tube that carries urine from your bladder to outside your body. This string is attached to the stent. Try not to pull on the string. The doctor will use the string to pull out the stent when you no longer need it. After the procedure, urine may flow better from your kidneys to your bladder. A ureteral stent may be left in place for several days or for as long as several months. Your doctor will take it out when you no longer need it. This care sheet gives you a general idea about how long it will take for you to recover. But each person recovers at a different pace. Follow the steps below to get better as quickly as possible. How can you care for yourself at home? Activity ? · Rest when you feel tired. Getting enough sleep will help you recover. ? · Avoid strenuous activities, such as bicycle riding, jogging, weight lifting, or aerobic exercise, until your doctor says it is okay. ? · Ask your doctor when you can drive again. ? · Most people are able to return to work the day after the procedure. If your work requires intense activity, you may feel pain in your kidney area or get tired easily. If this happens, you may need to do less strenuous activities while the stent is in. ? · Ask your doctor when it is okay for you to have sex. Diet ? · You can eat your normal diet. If your stomach is upset, try bland, low-fat foods like plain rice, broiled chicken, toast, and yogurt. ? · Drink plenty of fluids (unless your doctor tells you not to). Medicines ? · Your doctor will tell you if and when you can restart your medicines. He or she will also give you instructions about taking any new medicines. ? · If you take blood thinners, such as warfarin (Coumadin), clopidogrel (Plavix), or aspirin, be sure to talk to your doctor. He or she will tell you if and when to start taking those medicines again.  Make sure that you understand exactly what your doctor wants you to do. ? · Be safe with medicines. Take pain medicines exactly as directed. ¨ If the doctor gave you a prescription medicine for pain, take it as prescribed. ¨ If you are not taking a prescription pain medicine, ask your doctor if you can take an over-the-counter medicine. ? · If you think your pain medicine is making you sick to your stomach: 
¨ Take your medicine after meals (unless your doctor has told you not to). ¨ Ask your doctor for a different pain medicine. ? · If your doctor prescribed antibiotics, take them as directed. Do not stop taking them just because you feel better. You need to take the full course of antibiotics. Follow-up care is a key part of your treatment and safety. Be sure to make and go to all appointments, and call your doctor if you are having problems. It's also a good idea to know your test results and keep a list of the medicines you take. When should you call for help? Call 911 anytime you think you may need emergency care. For example, call if: 
? · You passed out (lost consciousness). ? · You have severe trouble breathing. ? · You have sudden chest pain and shortness of breath, or you cough up blood. ? · You have severe belly pain. ?Call your doctor now or seek immediate medical care if: 
? · Part or all of the stent comes out of your urethra. ? · You have pain that does not get better after you take pain medicine. ? · You have symptoms of a urinary infection. For example: ¨ You have blood or pus in your urine. ¨ You have pain in your back just below your rib cage. This is called flank pain. ¨ You have a fever, chills, or body aches. ¨ It hurts to urinate. ¨ You have groin or belly pain. ? · You cannot control when you urinate, or you leak urine. ? Watch closely for changes in your health, and be sure to contact your doctor if you have any problems. Where can you learn more? Go to http://jhonatan-mayda.info/. Enter Q045 in the search box to learn more about \"Ureteral Stent Placement: What to Expect at Home. \" Current as of: May 12, 2017 Content Version: 11.4 © 7597-8759 [employer]. Care instructions adapted under license by [employer] (which disclaims liability or warranty for this information). If you have questions about a medical condition or this instruction, always ask your healthcare professional. [address] any warranty or liability for your use of this information. Learning About Urinary Catheter Care to Prevent Infection What is a urinary catheter? A urinary catheter is a flexible plastic tube used to drain urine from your bladder when you can't urinate on your own. The catheter allows urine to drain from the bladder into a bag. Two types of drainage bags may be used with a urinary catheter. · A bedside bag is a large bag that you can hang on the side of your bed or on a chair. You can use it overnight or anytime you will be sitting or lying down for a long time. · A leg bag is a small bag that you can use during the day. It is usually attached to your thigh or calf and hidden under your clothes. Having a urinary catheter increases your risk of getting a urinary tract infection. Germs may get on the catheter and cause an infection in your bladder or kidneys. The longer you have a catheter, the more likely it is that you will get an infection. You can help prevent this problem with good hygiene and careful handling of your catheter and drainage bags. How can you help prevent infection? Take care to be clean · Always wash your hands well before and after you handle your catheter. · Clean the skin around the catheter twice a day using soap and water. Dry with a clean towel afterward. You can shower with your catheter and drainage bag in place unless your doctor told you not to. · When you clean around the catheter, check the surrounding skin for signs of infection. Look for things like pus or irritated, swollen, red, or tender skin around the catheter. Be careful with your drainage bag · Always keep the drainage bag below the level of your bladder. This will help keep urine from flowing back into your bladder. · Check often to see that urine is flowing through the catheter into the drainage bag. · Empty the drainage bag when it is half full. This will keep it from overflowing or backing up. · When you empty the drainage bag, do not let the tubing or drain spout touch anything. Be careful with your catheter · Do not unhook the catheter from the drain tube. That could let germs get into the tube. · Make sure that the catheter tubing does not get twisted or kinked. · Do not tug or pull on the catheter. And make sure that the drainage bag does not drag or pull on the catheter. · Do not put powder or lotion on the skin around the catheter. · Talk with your doctor about your options for sexual intercourse while wearing a catheter. How do you empty a urine drainage bag? If your doctor has asked you to keep a record, write down the amount of urine in the bag before you empty it. Wash your hands before and after you touch the bag. 1. Remove the drain spout from its sleeve at the bottom of the drainage bag. 
2. Open the valve on the drain spout. Let the urine flow out into the toilet or a container. Be careful not to let the tubing or drain spout touch anything. 3. After you empty the bag, wipe off any liquid on the end of the drain spout. Close the valve. Then put the drain spout back into its sleeve at the bottom of the collection bag. How do you add a bedside bag to a leg bag? Wash your hands before and after you handle the bags. 1. Empty the leg bag attached to the catheter. 2. Put a clean towel under the leg bag. 3. Use an alcohol wipe to clean the tip of the bedside bag. Then connect the bedside bag to the leg bag. How can you clean a bedside drainage bag? Many people clean their bedside bag in the morning if they switch to a leg bag. To clean a bedside drainage ba. Remove the bedside bag from the leg bag. 
2. Fill the bag with 2 parts vinegar and 3 parts water. Let it stand for 20 minutes. 3. Empty the bag, and let it air dry. When should you call for help? Call your doctor now or seek immediate medical care if: 
· You have symptoms of a urinary infection. These may include: 
¨ Pain or burning when you urinate. ¨ A frequent need to urinate without being able to pass much urine. ¨ Pain in the flank, which is just below the rib cage and above the waist on either side of the back. ¨ Blood in your urine. ¨ A fever. · Your urine smells bad. · You see large blood clots in your urine. · No urine or very little urine is flowing into the bag for 4 or more hours. Watch closely for changes in your health, and be sure to contact your doctor if: · The area around the catheter becomes irritated, swollen, red, or tender, or there is pus draining from it. · Urine is leaking from the place where the catheter enters your body. Follow-up care is a key part of your treatment and safety. Be sure to make and go to all appointments, and call your doctor if you are having problems. It's also a good idea to know your test results and keep a list of the medicines you take. Where can you learn more? Go to http://jhonatan-mayda.info/. Enter C910 in the search box to learn more about \"Learning About Urinary Catheter Care to Prevent Infection. \" Current as of: May 12, 2017 Content Version: 11.4 © 1420-1728 [employer]. Care instructions adapted under license by [employer] (which disclaims liability or warranty for this information).  If you have questions about a medical condition or this instruction, always ask your healthcare professional. [place] [address] any warranty or liability for your use of this information. Laxative, Stimulant Combination (By mouth) Treats constipation by helping you have a bowel movement. Brand Name(s): Colace, Doc-Q-Lax, Dok Plus, [facility] Stool Softener & Laxative, Good Sense Stimulant Laxative Plus, Laxacin, Medi-Laxx, Allyssa-Colace, [facility] Laxative and Stool Softener, [facility] P Col-Rite, Senexon-S, Senna-S, Senna-Time S, SennaLax-S, SennaPrompt There may be other brand names for this medicine. When This Medicine Should Not Be Used: You should not use this medicine if you have had an allergic reaction to senna, sennosides, docusate, casanthranol, or psyllium. Some brand names for these medicines are Correctol® stool softener, Ex-Lax®, Colace®, or Metamucil®. Make sure your doctor knows if you are allergic to any other laxative medicines. How to Use This Medicine:  
Tablet, Liquid Filled Capsule, Liquid, Granule, Capsule, Powder for Suspension · Your doctor will tell you how much medicine to use. Do not use more than directed. · If you have had a sudden change in your bowel movements in the past two weeks, ask your doctor before using this medicine. · Follow the instructions on the medicine label if you are using this medicine without a prescription. · Drink a full glass of water when you take this medicine. One full glass of water is about 8 ounces or 1 cup. Drinking 6 to 8 full glasses of water every day will help keep your bowel movements soft. · You might need to mix the granules or powder with water before you take each dose. Drink this mixture right away. Do not swallow the granules or powder dry unless the directions say you can. · Measure the oral liquid medicine with a marked measuring spoon, oral syringe, or medicine cup. · Use this medicine at bedtime, unless your doctor tells you otherwise. If a dose is missed: · Take a dose as soon as you remember. If it is almost time for your next dose, wait until then and take a regular dose. Do not take extra medicine to make up for a missed dose. How to Store and Dispose of This Medicine: · Store the medicine in a closed container at room temperature, away from heat, moisture, and direct light. · Ask your pharmacist, doctor, or health caregiver about the best way to dispose of any outdated medicine or medicine no longer needed. · Keep all medicine out of the reach of children. Never share your medicine with anyone. Drugs and Foods to Avoid: Ask your doctor or pharmacist before using any other medicine, including over-the-counter medicines, vitamins, and herbal products. · Make sure your doctor knows if you are also using mineral oil. · Some laxatives need to be taken 2 hours before or 2 hours after other medicines. If you need to take any other medicine, ask your health caregiver if you need to follow a special schedule. Warnings While Using This Medicine: · Make sure your doctor knows if you are pregnant or breast feeding. · Do not use this medicine if you have stomach pain, nausea, or vomiting, unless your health caregiver tells you to use it. · If you do not have a bowel movement after using this medicine, talk to your doctor. Most people will have a bowel movement within 6 to 12 hours after using this laxative. · You should not use this laxative for more than 1 week unless your doctor says it is okay. Laxatives may be habit-forming and can harm your bowels if you use them too long. Possible Side Effects While Using This Medicine:  
Call your doctor right away if you notice any of these side effects: · Bleeding from your rectum. · Skin rash. · Urine turns a different color. If you notice these less serious side effects, talk with your doctor: · Diarrhea, cramps, nausea, burping. If you notice other side effects that you think are caused by this medicine, tell your doctor. Call your doctor for medical advice about side effects. You may report side effects to FDA at 1-687-GKG-7300 © 2017 [address] Information is for End User's use only and may not be sold, redistributed or otherwise used for commercial purposes. The above information is an  only. It is not intended as medical advice for individual conditions or treatments. Talk to your doctor, nurse or pharmacist before following any medical regimen to see if it is safe and effective for you. Ciprofloxacin (By mouth) Ciprofloxacin (plu-[id number]-LZWP-a-sin) Treats infections and plague. This medicine is a quinolone antibiotic. Brand Name(s): Cipro There may be other brand names for this medicine. When This Medicine Should Not Be Used: This medicine is not right for everyone. Do not use it if you had an allergic reaction to ciprofloxacin or to similar medicines. How to Use This Medicine:  
Liquid, Tablet, Long Acting Tablet · Your doctor will tell you how much medicine to use. Do not use more than directed. Take this medicine at the same time each day. · You may take this medicine with or without food. Do not take this medicine with only a source of calcium, including milk, yogurt, or juice that contains added calcium. You may have foods or drinks that contain calcium as part of a larger meal. 
· Swallow the extended-release tablet whole. Do not crush, break, or chew it. · Oral liquid: Shake for at least 15 seconds just before each use. The liquid has small beads floating in it. Do not chew the beads when you drink the liquid. Measure the oral liquid medicine with a marked measuring spoon, oral syringe, or medicine cup. · Tablet: Swallow whole. Do not break, crush, or chew it. · Drink extra fluids so you will urinate more often and help prevent kidney problems. · Take all of the medicine in your prescription to clear up your infection, even if you feel better after the first few doses. · This medicine should come with a Medication Guide. Ask your pharmacist for a copy if you do not have one. · Missed dose: Take a dose as soon as you remember. If it is almost time for your next dose, wait until then and take a regular dose. Do not take extra medicine to make up for a missed dose. · Store the medicine in a closed container at room temperature, away from heat, moisture, and direct light. Throw away any leftover liquid medicine after 14 days. Drugs and Foods to Avoid: Ask your doctor or pharmacist before using any other medicine, including over-the-counter medicines, vitamins, and herbal products. · Do not use this medicine together with tizanidine. · Some foods and medicines can affect how ciprofloxacin works. Tell your doctor if you are using any of the following: ¨ Clozapine, cyclosporine, duloxetine, lidocaine, methotrexate, olanzapine, pentoxifylline, phenytoin, probenecid, ropinirole, sildenafil, theophylline, zolpidem ¨ Antibiotic (including azithromycin, clarithromycin, erythromycin) ¨ Blood thinner (including warfarin) ¨ Diabetes medicine (including glimepiride, glyburide) ¨ Medicine for depression or mental illness ¨ Medicine for heart rhythm problems (including amiodarone, procainamide, quinidine, sotalol) ¨ NSAID pain medicine (including aspirin, celecoxib, diclofenac, ibuprofen, naproxen) ¨ Steroid medicine (including hydrocortisone, methylprednisolone, prednisone) · Take ciprofloxacin at least 2 hours before or 6 hours after you take didanosine buffered tablets for oral suspension or the pediatric powder for oral suspension, sucralfate, or antacids, multivitamins, or other products containing aluminum, magnesium, lanthanum, sevelamer, iron, or zinc. 
· This medicine slows the digestion of caffeine, so it might affect you for longer than normal. 
 Warnings While Using This Medicine: · Tell your doctor if you are pregnant or breastfeeding, or if you have kidney disease, liver disease, diabetes, heart disease, myasthenia gravis, or a history of heart rhythm problems (including prolonged QT interval), nerve problems, or seizures. Tell your doctor if you have ever had tendon or joint problems, including rheumatoid arthritis, or if you have received a transplant. · This medicine may cause the following problems: 
¨ Tendinitis and tendon rupture (which may happen after treatment ends) ¨ Liver damage ¨ Nerve damage in the arms or legs ¨ Heart rhythm changes ¨ Changes in blood sugar levels · This medicine may make you dizzy, drowsy, or lightheaded. Do not drive or do anything else that could be dangerous until you know how this medicine affects you. · This medicine can cause diarrhea. Call your doctor if the diarrhea becomes severe, does not stop, or is bloody. Do not take any medicine to stop diarrhea until you have talked to your doctor. Diarrhea can occur 2 months or more after you stop taking this medicine. · This medicine may make your skin more sensitive to sunlight. Wear sunscreen. Do not use sunlamps or tanning beds. · Call your doctor if your symptoms do not improve or if they get worse. · Keep all medicine out of the reach of children. Never share your medicine with anyone. Possible Side Effects While Using This Medicine:  
Call your doctor right away if you notice any of these side effects: · Allergic reaction: Itching or hives, swelling in your face or hands, swelling or tingling in your mouth or throat, chest tightness, trouble breathing · Blistering, peeling, red skin rash · Dark urine, pale stools, nausea, vomiting, loss of appetite, stomach pain, yellow skin or eyes · Diarrhea which may contain blood · Fainting, dizziness, or lightheadedness · Fast, slow, or uneven heartbeat · Numbness, tingling, weakness, or burning pain in your hands, arms, legs, or feet · Pain, stiffness, swelling, or bruises around your ankle, leg, shoulder, or other joints · Seizures, severe headache, unusual thoughts or behaviors, trouble sleeping, feeling anxious, confused, or depressed, seeing, hearing, or feeling things that are not there · Unusual bleeding, bruising, or weakness If you notice other side effects that you think are caused by this medicine, tell your doctor. Call your doctor for medical advice about side effects. You may report side effects to FDA at 9-825-FDA-8102 © 2017 [address] Information is for End User's use only and may not be sold, redistributed or otherwise used for commercial purposes. The above information is an  only. It is not intended as medical advice for individual conditions or treatments. Talk to your doctor, nurse or pharmacist before following any medical regimen to see if it is safe and effective for you. Phenazopyridine (By mouth) Phenazopyridine (yvb-ls-ius-PIR-i-pamela) Relieves symptoms caused by urinary tract infections and other urinary problems. Brand Name(s): Azo Standard, Azo Urinary Pain Relief, Azo Urinary Tract Health, Baridium, Leader Urinary Pain Relief, Preferred Urinary Pain Relief, Pyridium, [employer] Urinary Pain Relief, Urinary Pain Relief, [facility] Wellbeing UTI Relief There may be other brand names for this medicine. When This Medicine Should Not Be Used: You should not use this medicine if you have had an allergic reaction to phenazopyridine. How to Use This Medicine:  
Tablet · Your doctor will tell you how much to take and how often. · Swallow the tablets whole, do not crush or chew. · You may take the medicine with food to avoid an upset stomach. If a dose is missed: · Take the missed dose as soon as possible. · Skip the missed dose if it is almost time for your next regular dose. · You should not use two doses at the same time. How to Store and Dispose of This Medicine: · Store at room temperature, away from moisture and direct light. · Ask your pharmacist, doctor, or health caregiver about the best way to dispose of any outdated medicine or medicine no longer needed. · Keep all medicine out of the reach of children. Drugs and Foods to Avoid: Ask your doctor or pharmacist before using any other medicine, including over-the-counter medicines, vitamins, and herbal products. Warnings While Using This Medicine: · Check with your doctor before taking phenazopyridine if you have kidney or liver problems or are pregnant or breastfeeding. · This medicine can turn urine a red or orange color. This is normal. 
· This medicine may stain soft contact lenses. You may not want to wear your contacts while taking this medicine. Possible Side Effects While Using This Medicine:  
Call your doctor right away if you notice any of these side effects: 
· Skin rash or hives, trouble breathing · Yellowing of the skin or eyes If you notice these less serious side effects, talk with your doctor: · Indigestion or stomach upset · Dizziness · Headache If you notice other side effects that you think are caused by this medicine, tell your doctor. Call your doctor for medical advice about side effects. You may report side effects to FDA at 3-691-FDA-1610 © 2017 [address] Information is for End User's use only and may not be sold, redistributed or otherwise used for commercial purposes. The above information is an  only. It is not intended as medical advice for individual conditions or treatments. Talk to your doctor, nurse or pharmacist before following any medical regimen to see if it is safe and effective for you. Oxycodone, Rapid Release (By mouth) Oxycodone Hydrochloride (br-s-COS-done ashley-droe-KLOR-abhishek) Treats moderate to severe pain. This medicine is a narcotic pain reliever. Brand Name(s): Oxaydo, Oxy IR, Roxicodone There may be other brand names for this medicine. When This Medicine Should Not Be Used: This medicine is not right for everyone. Do not use it if you had an allergic reaction to oxycodone, codeine, hydrocodone, dihydrocodeine, or morphine, or you have a stomach or bowel blockage. How to Use This Medicine:  
Capsule, Liquid, Tablet · Take your medicine as directed. Your dose may need to be changed several times to find what works best for you. · An overdose can be dangerous. Follow directions carefully so you do not get too much medicine at one time. · Oral liquid: Measure the oral liquid medicine with a marked measuring spoon, oral syringe, or medicine cup. · Oxaydo® tablet: Swallow it whole with enough water to swallow it completely. Do not break, crush, chew, or dissolve it. Do not wet the tablet before you put it in your mouth. · This medicine should come with a Medication Guide. Ask your pharmacist for a copy if you do not have one. · Missed dose: Take a dose as soon as you remember. If it is almost time for your next dose, wait until then and take a regular dose. Do not take extra medicine to make up for a missed dose. · Store the medicine in a closed container at room temperature, away from heat, moisture, and direct light. Store the medicine in a secure place to prevent others from getting it. Ask your pharmacist about the best way to dispose of medicine you do not use. Drugs and Foods to Avoid: Ask your doctor or pharmacist before using any other medicine, including over-the-counter medicines, vitamins, and herbal products. · Do not use this medicine if you are using or have used an MAO inhibitor within the past 14 days. · Some medicines can affect how oxycodone works. Tell your doctor if you are using any of the following: ¨ Amiodarone, carbamazepine, erythromycin, ketoconazole, phenytoin, quinidine, rifampin, ritonavir ¨ Diuretic (water pill) ¨ Medicine to treat depression or anxiety ¨ Medicine to treat migraine headaches ¨ Phenothiazine medicine · Tell your doctor if you use anything else that makes you sleepy. Some examples are allergy medicine, narcotic pain medicine, and alcohol. Tell your doctor if you are using buprenorphine, butorphanol, nalbuphine, pentazocine, or a muscle relaxer. · Do not drink alcohol while you are using this medicine. Warnings While Using This Medicine: · Tell your doctor if you are pregnant or breastfeeding, or if you have kidney disease, liver disease, heart disease, low blood pressure, lung disease or breathing problems (such as asthma, COPD), scoliosis, an enlarged prostate or trouble urinating, an underactive thyroid, Hessmer disease, gallbladder or pancreas problems, or digestion problems. Tell your doctor if you have a history of head injury, brain tumor, mental health problems, seizures, or alcohol or drug addiction. · This medicine may cause the following problems: 
¨ High risk of overdose, which can lead to death ¨ Respiratory depression (serious breathing problem that can be life-threatening) ¨ Serotonin syndrome, when used with certain medicines · This medicine may make you dizzy, drowsy, or faint. Do not drive or do anything else that could be dangerous until you know how this medicine affects you. Sit or lie down if you feel dizzy. Stand up carefully. · This medicine can be habit-forming. Do not use more than your prescribed dose. Call your doctor if you think your medicine is not working. · Do not stop using this medicine suddenly. Your doctor will need to slowly decrease your dose before you stop it completely. · This medicine may cause constipation, especially with long-term use.  Ask your doctor if you should use a laxative to prevent and treat constipation. Drink plenty of liquids to help avoid constipation. · This medicine could cause infertility. Talk with your doctor before using this medicine if you plan to have children. · Keep all medicine out of the reach of children. Never share your medicine with anyone. Possible Side Effects While Using This Medicine:  
Call your doctor right away if you notice any of these side effects: · Allergic reaction: Itching or hives, swelling in your face or hands, swelling or tingling in your mouth or throat, chest tightness, trouble breathing · Anxiety, restlessness, fast heartbeat, fever, sweating, muscle spasms, twitching, nausea, vomiting, diarrhea, seeing or hearing things that are not there · Blue lips, fingernails, or skin, trouble breathing · Extreme dizziness or weakness, shallow breathing, slow heartbeat, sweating, cold or clammy skin, seizures · Lightheadedness, dizziness, fainting · Severe constipation, stomach pain If you notice these less serious side effects, talk with your doctor: · Mild constipation · Sleepiness, tiredness If you notice other side effects that you think are caused by this medicine, tell your doctor. Call your doctor for medical advice about side effects. You may report side effects to FDA at 4-884-FDA-0820 © 2017 [address] Information is for End User's use only and may not be sold, redistributed or otherwise used for commercial purposes. The above information is an  only. It is not intended as medical advice for individual conditions or treatments. Talk to your doctor, nurse or pharmacist before following any medical regimen to see if it is safe and effective for you. The [employer] Announcement We are excited to announce that we are making your provider's discharge notes available to you in [employer].   You will see these notes when they are completed and signed by the physician that discharged you from your recent hospital stay. If you have any questions or concerns about any information you see in [employer], please call the Health Information Department where you were seen or reach out to your Primary Care Provider for more information about your plan of care. Introducing [facility]! [facility] introduces [employer] patient portal. Now you can access parts of your medical record, email your doctor's office, and request medication refills online. 1. In your internet browser, go to [email or web address] 2. Click on the First Time User? Click Here link in the Sign In box. You will see the New Member Sign Up page. 3. Enter your [employer] Access Code exactly as it appears below. You will not need to use this code after youve completed the sign-up process. If you do not sign up before the expiration date, you must request a new code. · [employer] Access Code: [email or web address] Expires: 1/24/2018  9:31 AM 
 
4. Enter the last four digits of your Social Security Number (xxxx) and Date of Birth (mm/dd/yyyy) as indicated and click Submit. You will be taken to the next sign-up page. 5. Create a [employer] ID. This will be your [employer] login ID and cannot be changed, so think of one that is secure and easy to remember. 6. Create a [employer] password. You can change your password at any time. 7. Enter your Password Reset Question and Answer. This can be used at a later time if you forget your password. 8. Enter your e-mail address. You will receive e-mail notification when new information is available in [address] 9. Click Sign Up. You can now view and download portions of your medical record. 10. Click the Download Summary menu link to download a portable copy of your medical information. If you have questions, please visit the Frequently Asked Questions section of the [employer] website. Remember, [employer] is NOT to be used for urgent needs. For medical emergencies, dial 911. Now available from your iPhone and Android! Unresulted Labs-Please follow up with your PCP about these lab tests Order Current Status XR RETROGRADE PYELOGRAM In process Providers Seen During Your Hospitalization Provider Specialty Primary office phone Leoncio Jones MD Urology [phone] Your Primary Care Physician (PCP) Primary Care Physician Office Phone Office Fax Miguelito Elena [phone] You are allergic to the following Allergen Reactions Neurontin (Gabapentin) Other (comments)  
 confusion Topamax (Topiramate) Other (comments)  
 confusion Recent Documentation Height Weight BMI Smoking Status 1.778 m 92.1 kg 29.15 kg/m2 Former Smoker Emergency Contacts Name Discharge Info Relation Home Work Mobile VDUN,[employer] DISCHARGE CAREGIVER [3] Spouse [3] [phone] Patient Belongings The following personal items are in your possession at time of discharge: 
  Dental Appliances: None         Home Medications: None   Jewelry: None  Clothing: Undergarments, Jacket/Coat, Socks, Footwear, Shirt, Pants    Other Valuables: Cell Phone Please provide this summary of care documentation to your next provider. Signatures-by signing, you are acknowledging that this After Visit Summary has been reviewed with you and you have received a copy. Patient Signature:  ____________________________________________________________ Date:  ____________________________________________________________  
  
Aliya Mealing Provider Signature:  ____________________________________________________________ Date:  ____________________________________________________________

## 2018-01-16 NOTE — PROGRESS NOTES
Mr. Veto Cotter has a reminder for a \"due or due soon\" health maintenance. I have asked that he contact his primary care provider for follow-up on this health maintenance.

## 2018-01-16 NOTE — H&P
Anayeli Engle 1 78 y.o. male      Mr. Baudilio Morales seen today for kidney stone disease follow-up-patient is a PICC is experiencing right-sided flank pain for the past 6 days presenting to the emergency room at [facility] on 10 January 2018 complaining of right flank pain finding a 1.3 mm stone obstructing the distal right ureter associated with right-sided hydronephrosis and perinephric stranding. There are no stone densities evident in the left kidney nor signs of obstruction of the left urinary tract. A 5 mm stone in the midpole region of the right kidney is noted incidentally. Patient has experienced ongoing colicky pain for the past week but has not experienced dysuria nor fever-no history of kidney stone disease prior to current episode     History of symptomatic BPH responding favorably to alpha-blocker and 5 alpha reductase inhibitor therapy     Patient is voiding with a forceful stream good control no daytime urgency frequency and no episodes of nocturia since beginning treatment with these medications          PSA 1.1 and August 2013  PSA 1.1 in August 2014  PSA 0.9 in September 2015  PSA 0.8 in October 2016  PSA 0.8 in April 2017      Testosterone 245      PVR 59 cc in October 2016   cc in April 2017  PVR 74 cc in October 2017      Review of Systems:    CNS: No headaches seizures syncope or visual changes  Respiratory: No lesion or shortness of breath  Cardiovascular:Hypertension  Intestinal:GERD no change in bowel habits  Urinary: Symptomatic BPH  Skeletal: no bone or joint pain  Endocrine:Large joint arthritisNo diabetes  Other:        Allergies:          Allergies   Allergen Reactions    Neurontin [Gabapentin] Other (comments)       confusion    Topamax [Topiramate] Other (comments)       confusion      Medications:           Current Outpatient Prescriptions   Medication Sig Dispense Refill    tamsulosin (FLOMAX) 0.4 mg capsule take 1 capsule by mouth once daily 30 Cap 3    zolpidem (AMBIEN) 10 mg tablet     0    finasteride (PROSCAR) 5 mg tablet take 1 tablet by mouth once daily 90 Tab 3    quinapril (ACCUPRIL) 20 mg tablet Take 1 Tab by mouth nightly. (Patient taking differently: Take 40 mg by mouth daily.) 90 Tab 4    fenofibrate nanocrystallized (TRICOR) 145 mg tablet 1 qd 90 Tab 4    naproxen sodium (ALEVE) 220 mg tablet Take 220 mg by mouth two (2) times daily as needed.        loratadine (CLARITIN) 10 mg tablet Take 10 mg by mouth daily as needed.        citalopram (CELEXA) 20 mg tablet Take 1 Tab by mouth daily. 90 Tab 3    esomeprazole (NEXIUM) 40 mg capsule Take 1 Cap by mouth daily. 90 Cap 3    FOLIC ACID/MULTIVIT-MIN/LUTEIN (CENTRUM SILVER PO) Take  by mouth.        cyclobenzaprine (FLEXERIL) 10 mg tablet Take  by mouth three (3) times daily as needed for Muscle Spasm(s).        pioglitazone (ACTOS) 30 mg tablet 1 qd 90 Tab 3    atorvastatin (LIPITOR) 40 mg tablet Take 1 Tab by mouth daily.  90 Tab 3    hydrocodone-acetaminophen (LORTAB)  mg per tablet Take 1 Tab by mouth.                  Past Medical History:   Diagnosis Date    Asbestosis ([address]      Bilateral shoulder pain      Chronic airway obstruction, not elsewhere classified      Depressive disorder, not elsewhere classified      Diabetes mellitus (HCC)      Esophagitis, unspecified      Generalized osteoarthrosis, involving multiple sites      GERD (gastroesophageal reflux disease)      Headache(784.0)      Hematuria       neg cysto    Hypercholesterolemia      Hypercholesterolemia      Hypertension      Mixed hyperlipidemia      Neck pain      Pancreatitis 6/2014    Rheumatic fever       w/o sequelae    Type II or unspecified type diabetes mellitus without mention of complication, not stated as uncontrolled      Unspecified hyperplasia of prostate with urinary obstruction and other lower urinary tract symptoms (LUTS)              Past Surgical History:   Procedure Laterality Date    HX APPENDECTOMY        HX COLONOSCOPY   7/26/2013     diverticulosis    HX OPEN REDUCTION INTERNAL FIXATION Right       clavicle fx             Family History   Problem Relation Age of Onset    Cancer Father      Cancer Other      Heart Disease Maternal Grandmother      Heart Disease Paternal Grandfather      Other Sister         brain hemorrhage         Physical Examination: Mature male distressed with colicky right flank pain  Neck: No masses no nodes no bruits  Lungs:  No wheezes no rales no rhonchi  Heart: No murmurs no gallops no rubs  Abdomen: Nontender no palpable masses no organomegaly no bruits  Back: Mild right percussion CVA tenderness  Skin: Warm and dry no rash no lesions  Neurologic: No motor or sensory deficits in arms or legs        Urinalysis: Heme/negative nitrite     CT scan imaging of the abdomen and pelvis on 10 January 2018-images reviewed on CD of CT scan performed at [facility]-1.3 mm distal right ureteral stone associated with right-sided hydronephrosis and perinephric stranding-5 mm midpole stone in the right kidney-no stone evident in the left urinary tract-no signs of left urinary tract obstruction-images have been downloaded and scanned into the EMR also laser printed for the patient today     Ultrasound imaging of the kidneys today shows marked right-sided hydronephrosis           Impression: Symptomatic 1.3 mm distal right ureteral stone with no signs of progress in spontaneous passage after 6 days           Plan: Ureteroscopic laser lithotripsy with stent placement-planned removal of both distal right ureteral stone and right kidney stone     Counseling Note:     Indications for an technique of ureteroscopic laser lithotripsy have been described discussed with patient and spouse today-risks of bleeding, infection, obstruction, perforation, and irritation from postoperative indwelling ureteral stent explained,  understood, and accepted.     Sonja Mccall Logan Munoz MD

## 2018-01-16 NOTE — PROGRESS NOTES
Laila Arizmendi. 78 y.o. male     Mr. Kiko De La Vega seen today for kidney stone disease follow-up-patient is a PICC is experiencing right-sided flank pain for the past 6 days presenting to the emergency room at [facility] on 10 January 2018 complaining of right flank pain finding a 1.3 mm stone obstructing the distal right ureter associated with right-sided hydronephrosis and perinephric stranding. There are no stone densities evident in the left kidney nor signs of obstruction of the left urinary tract. A 5 mm stone in the midpole region of the right kidney is noted incidentally. Patient has experienced ongoing colicky pain for the past week but has not experienced dysuria nor fever-no history of kidney stone disease prior to current episode    History of symptomatic BPH responding favorably to alpha-blocker and 5 alpha reductase inhibitor therapy    Patient is voiding with a forceful stream good control no daytime urgency frequency and no episodes of nocturia since beginning treatment with these medications          PSA 1.1 and August 2013  PSA 1.1 in August 2014  PSA 0.9 in September 2015  PSA 0.8 in October 2016  PSA 0.8 in April 2017     Testosterone 245      PVR 59 cc in October 2016   cc in April 2017  PVR 74 cc in October 2017      Review of Systems:    CNS: No headaches seizures syncope or visual changes  Respiratory: No lesion or shortness of breath  Cardiovascular:Hypertension  Intestinal:GERD no change in bowel habits  Urinary: Symptomatic BPH  Skeletal: no bone or joint pain  Endocrine:Large joint arthritisNo diabetes  Other:      Allergies:    Allergies   Allergen Reactions    Neurontin [Gabapentin] Other (comments)     confusion    Topamax [Topiramate] Other (comments)     confusion      Medications:    Current Outpatient Prescriptions   Medication Sig Dispense Refill    tamsulosin (FLOMAX) 0.4 mg capsule take 1 capsule by mouth once daily 30 Cap 3    zolpidem (AMBIEN) 10 mg tablet   0    finasteride (PROSCAR) 5 mg tablet take 1 tablet by mouth once daily 90 Tab 3    quinapril (ACCUPRIL) 20 mg tablet Take 1 Tab by mouth nightly. (Patient taking differently: Take 40 mg by mouth daily.) 90 Tab 4    fenofibrate nanocrystallized (TRICOR) 145 mg tablet 1 qd 90 Tab 4    naproxen sodium (ALEVE) 220 mg tablet Take 220 mg by mouth two (2) times daily as needed.  loratadine (CLARITIN) 10 mg tablet Take 10 mg by mouth daily as needed.  citalopram (CELEXA) 20 mg tablet Take 1 Tab by mouth daily. 90 Tab 3    esomeprazole (NEXIUM) 40 mg capsule Take 1 Cap by mouth daily. 90 Cap 3    FOLIC ACID/MULTIVIT-MIN/LUTEIN (CENTRUM SILVER PO) Take  by mouth.  cyclobenzaprine (FLEXERIL) 10 mg tablet Take  by mouth three (3) times daily as needed for Muscle Spasm(s).  pioglitazone (ACTOS) 30 mg tablet 1 qd 90 Tab 3    atorvastatin (LIPITOR) 40 mg tablet Take 1 Tab by mouth daily. 90 Tab 3    hydrocodone-acetaminophen (LORTAB)  mg per tablet Take 1 Tab by mouth.          Past Medical History:   Diagnosis Date    Asbestosis ([address]     Bilateral shoulder pain     Chronic airway obstruction, not elsewhere classified     Depressive disorder, not elsewhere classified     Diabetes mellitus ([facility] [address]     Esophagitis, unspecified     Generalized osteoarthrosis, involving multiple sites     GERD (gastroesophageal reflux disease)     Headache(784.0)     Hematuria     neg cysto    Hypercholesterolemia     Hypercholesterolemia     Hypertension     Mixed hyperlipidemia     Neck pain     Pancreatitis 6/2014    Rheumatic fever     w/o sequelae    Type II or unspecified type diabetes mellitus without mention of complication, not stated as uncontrolled     Unspecified hyperplasia of prostate with urinary obstruction and other lower urinary tract symptoms (LUTS)       Past Surgical History:   Procedure Laterality Date    HX APPENDECTOMY      HX COLONOSCOPY  7/26/2013 diverticulosis    HX OPEN REDUCTION INTERNAL FIXATION Right     clavicle fx     Family History   Problem Relation Age of Onset    Cancer Father     Cancer Other     Heart Disease Maternal Grandmother     Heart Disease Paternal Grandfather     Other Sister      brain hemorrhage        Physical Examination: Mature male distressed with colicky right flank pain  Neck: No masses no nodes no bruits  Lungs: No wheezes no rales no rhonchi  Heart: No murmurs no gallops no rubs  Abdomen: Nontender no palpable masses no organomegaly no bruits  Back: Mild right percussion CVA tenderness  Skin: Warm and dry no rash no lesions  Neurologic: No motor or sensory deficits in arms or legs      Urinalysis: Heme/negative nitrite    CT scan imaging of the abdomen and pelvis on 10 January 2018-images reviewed on CD of CT scan performed at [facility]-1.3 mm distal right ureteral stone associated with right-sided hydronephrosis and perinephric stranding-5 mm midpole stone in the right kidney-no stone evident in the left urinary tract-no signs of left urinary tract obstruction-images have been downloaded and scanned into the EMR also laser printed for the patient today    Ultrasound imaging of the kidneys today shows marked right-sided hydronephrosis        Impression: Symptomatic 1.3 mm distal right ureteral stone with no signs of progress in spontaneous passage after 6 days        Plan: Ureteroscopic laser lithotripsy with stent placement-planned removal of both distal right ureteral stone and right kidney stone    Counseling Note:    Indications for an technique of ureteroscopic laser lithotripsy have been described discussed with patient and spouse today-risks of bleeding, infection, obstruction, perforation, and irritation from postoperative indwelling ureteral stent explained,  understood, and accepted.     RTC 10 days for stent removal      More than 1/2 of this 40minute visit was spent in counselling and coordination of care, as described above. Deyanira De Oliveira MD  -electronically signed-    PLEASE NOTE:  This document has been produced using voice recognition software. Unrecognized errors in transcription may be present.

## 2018-01-17 LAB — GLUCOSE BLD STRIP.AUTO-MCNC: 99 MG/DL (ref 70–110)

## 2018-01-17 NOTE — DISCHARGE INSTRUCTIONS
Light activity  Reg diet  Gregory to leg bag- d/c tomorrow    Rx Oxycodone, Colace, Cipro     rtc 10 days    Yosef Granados MD    DISCHARGE SUMMARY from Nurse    PATIENT INSTRUCTIONS:    After general anesthesia or intravenous sedation, for 24 hours or while taking prescription Narcotics:  · Limit your activities  · Do not drive and operate hazardous machinery  · Do not make important personal or business decisions  · Do  not drink alcoholic beverages  · If you have not urinated within 8 hours after discharge, please contact your surgeon on call. Report the following to your surgeon:  · Excessive pain, swelling, redness or odor of or around the surgical area  · Temperature over 100.5  · Nausea and vomiting lasting longer than 4 hours or if unable to take medications  · Any signs of decreased circulation or nerve impairment to extremity: change in color, persistent  numbness, tingling, coldness or increase pain  · Any questions    What to do at Home:      *  Please give a list of your current medications to your Primary Care Provider. *  Please update this list whenever your medications are discontinued, doses are      changed, or new medications (including over-the-counter products) are added. *  Please carry medication information at all times in case of emergency situations. These are general instructions for a healthy lifestyle:    No smoking/ No tobacco products/ Avoid exposure to second hand smoke  Surgeon General's Warning:  Quitting smoking now greatly reduces serious risk to your health.     Obesity, smoking, and sedentary lifestyle greatly increases your risk for illness    A healthy diet, regular physical exercise & weight monitoring are important for maintaining a healthy lifestyle    You may be retaining fluid if you have a history of heart failure or if you experience any of the following symptoms:  Weight gain of 3 pounds or more overnight or 5 pounds in a week, increased swelling in our hands or feet or shortness of breath while lying flat in bed. Please call your doctor as soon as you notice any of these symptoms; do not wait until your next office visit. Recognize signs and symptoms of STROKE:    F-face looks uneven    A-arms unable to move or move unevenly    S-speech slurred or non-existent    T-time-call 911 as soon as signs and symptoms begin-DO NOT go       Back to bed or wait to see if you get better-TIME IS BRAIN. Warning Signs of HEART ATTACK     Call 911 if you have these symptoms:   Chest discomfort. Most heart attacks involve discomfort in the center of the chest that lasts more than a few minutes, or that goes away and comes back. It can feel like uncomfortable pressure, squeezing, fullness, or pain.  Discomfort in other areas of the upper body. Symptoms can include pain or discomfort in one or both arms, the back, neck, jaw, or stomach.  Shortness of breath with or without chest discomfort.  Other signs may include breaking out in a cold sweat, nausea, or lightheadedness. Don't wait more than five minutes to call 911 - MINUTES MATTER! Fast action can save your life. Calling 911 is almost always the fastest way to get lifesaving treatment. Emergency Medical Services staff can begin treatment when they arrive -- up to an hour sooner than if someone gets to the hospital by car. The discharge information has been reviewed with the patient and spouse. The patient and spouse verbalized understanding. Discharge medications reviewed with the patient and spouse and appropriate educational materials and side effects teaching were provided.   ___________________________________________________________________________________________________________________________________       Ureteral Stent Placement: What to Expect at [address] [place]  A ureteral (say \"you-REE-ter-ul\") stent is a thin, hollow tube that is placed in the ureter to help urine pass from the kidney into the bladder. Ureters are the tubes that connect the kidneys to the bladder. You may have a small amount of blood in your urine for 1 to 3 days after the procedure. While the stent is in place, you may have to urinate more often, feel a sudden need to urinate, or feel like you cannot completely empty your bladder. You may feel some pain when you urinate or do strenuous activity. You also may notice a small amount of blood in your urine after strenuous activities. These side effects usually do not prevent people from doing their normal daily activities. You may have a thin string coming out of your urethra. Your urethra is the tube that carries urine from your bladder to outside your body. This string is attached to the stent. Try not to pull on the string. The doctor will use the string to pull out the stent when you no longer need it. After the procedure, urine may flow better from your kidneys to your bladder. A ureteral stent may be left in place for several days or for as long as several months. Your doctor will take it out when you no longer need it. This care sheet gives you a general idea about how long it will take for you to recover. But each person recovers at a different pace. Follow the steps below to get better as quickly as possible. How can you care for yourself at home? Activity  ? · Rest when you feel tired. Getting enough sleep will help you recover. ? · Avoid strenuous activities, such as bicycle riding, jogging, weight lifting, or aerobic exercise, until your doctor says it is okay. ? · Ask your doctor when you can drive again. ? · Most people are able to return to work the day after the procedure. If your work requires intense activity, you may feel pain in your kidney area or get tired easily. If this happens, you may need to do less strenuous activities while the stent is in. ? · Ask your doctor when it is okay for you to have sex. Diet  ? · You can eat your normal diet.  If your stomach is upset, try bland, low-fat foods like plain rice, broiled chicken, toast, and yogurt. ? · Drink plenty of fluids (unless your doctor tells you not to). Medicines  ? · Your doctor will tell you if and when you can restart your medicines. He or she will also give you instructions about taking any new medicines. ? · If you take blood thinners, such as warfarin (Coumadin), clopidogrel (Plavix), or aspirin, be sure to talk to your doctor. He or she will tell you if and when to start taking those medicines again. Make sure that you understand exactly what your doctor wants you to do. ? · Be safe with medicines. Take pain medicines exactly as directed. ¨ If the doctor gave you a prescription medicine for pain, take it as prescribed. ¨ If you are not taking a prescription pain medicine, ask your doctor if you can take an over-the-counter medicine. ? · If you think your pain medicine is making you sick to your stomach:  ¨ Take your medicine after meals (unless your doctor has told you not to). ¨ Ask your doctor for a different pain medicine. ? · If your doctor prescribed antibiotics, take them as directed. Do not stop taking them just because you feel better. You need to take the full course of antibiotics. Follow-up care is a key part of your treatment and safety. Be sure to make and go to all appointments, and call your doctor if you are having problems. It's also a good idea to know your test results and keep a list of the medicines you take. When should you call for help? Call 911 anytime you think you may need emergency care. For example, call if:  ? · You passed out (lost consciousness). ? · You have severe trouble breathing. ? · You have sudden chest pain and shortness of breath, or you cough up blood. ? · You have severe belly pain. ?Call your doctor now or seek immediate medical care if:  ? · Part or all of the stent comes out of your urethra.    ? · You have pain that does not get better after you take pain medicine. ? · You have symptoms of a urinary infection. For example:  ¨ You have blood or pus in your urine. ¨ You have pain in your back just below your rib cage. This is called flank pain. ¨ You have a fever, chills, or body aches. ¨ It hurts to urinate. ¨ You have groin or belly pain. ? · You cannot control when you urinate, or you leak urine. ? Watch closely for changes in your health, and be sure to contact your doctor if you have any problems. Where can you learn more? Go to http://jhonatan-mayda.info/. Enter U511 in the search box to learn more about \"Ureteral Stent Placement: What to Expect at Home. \"  Current as of: May 12, 2017  Content Version: 11.4  © 0180-9946 [employer]. Care instructions adapted under license by [employer] (which disclaims liability or warranty for this information). If you have questions about a medical condition or this instruction, always ask your healthcare professional. [place] [address] any warranty or liability for your use of this information. Learning About Urinary Catheter Care to Prevent Infection  What is a urinary catheter? A urinary catheter is a flexible plastic tube used to drain urine from your bladder when you can't urinate on your own. The catheter allows urine to drain from the bladder into a bag. Two types of drainage bags may be used with a urinary catheter. · A bedside bag is a large bag that you can hang on the side of your bed or on a chair. You can use it overnight or anytime you will be sitting or lying down for a long time. · A leg bag is a small bag that you can use during the day. It is usually attached to your thigh or calf and hidden under your clothes. Having a urinary catheter increases your risk of getting a urinary tract infection. Germs may get on the catheter and cause an infection in your bladder or kidneys.  The longer you have a catheter, the more likely it is that you will get an infection. You can help prevent this problem with good hygiene and careful handling of your catheter and drainage bags. How can you help prevent infection? Take care to be clean  · Always wash your hands well before and after you handle your catheter. · Clean the skin around the catheter twice a day using soap and water. Dry with a clean towel afterward. You can shower with your catheter and drainage bag in place unless your doctor told you not to. · When you clean around the catheter, check the surrounding skin for signs of infection. Look for things like pus or irritated, swollen, red, or tender skin around the catheter. Be careful with your drainage bag  · Always keep the drainage bag below the level of your bladder. This will help keep urine from flowing back into your bladder. · Check often to see that urine is flowing through the catheter into the drainage bag. · Empty the drainage bag when it is half full. This will keep it from overflowing or backing up. · When you empty the drainage bag, do not let the tubing or drain spout touch anything. Be careful with your catheter  · Do not unhook the catheter from the drain tube. That could let germs get into the tube. · Make sure that the catheter tubing does not get twisted or kinked. · Do not tug or pull on the catheter. And make sure that the drainage bag does not drag or pull on the catheter. · Do not put powder or lotion on the skin around the catheter. · Talk with your doctor about your options for sexual intercourse while wearing a catheter. How do you empty a urine drainage bag? If your doctor has asked you to keep a record, write down the amount of urine in the bag before you empty it. Wash your hands before and after you touch the bag. 1. Remove the drain spout from its sleeve at the bottom of the drainage bag.  2. Open the valve on the drain spout.  Let the urine flow out into the toilet or a container. Be careful not to let the tubing or drain spout touch anything. 3. After you empty the bag, wipe off any liquid on the end of the drain spout. Close the valve. Then put the drain spout back into its sleeve at the bottom of the collection bag. How do you add a bedside bag to a leg bag? Wash your hands before and after you handle the bags. 1. Empty the leg bag attached to the catheter. 2. Put a clean towel under the leg bag.  3. Use an alcohol wipe to clean the tip of the bedside bag. Then connect the bedside bag to the leg bag. How can you clean a bedside drainage bag? Many people clean their bedside bag in the morning if they switch to a leg bag. To clean a bedside drainage ba. Remove the bedside bag from the leg bag.  2. Fill the bag with 2 parts vinegar and 3 parts water. Let it stand for 20 minutes. 3. Empty the bag, and let it air dry. When should you call for help? Call your doctor now or seek immediate medical care if:  · You have symptoms of a urinary infection. These may include:  ¨ Pain or burning when you urinate. ¨ A frequent need to urinate without being able to pass much urine. ¨ Pain in the flank, which is just below the rib cage and above the waist on either side of the back. ¨ Blood in your urine. ¨ A fever. · Your urine smells bad. · You see large blood clots in your urine. · No urine or very little urine is flowing into the bag for 4 or more hours. Watch closely for changes in your health, and be sure to contact your doctor if:  · The area around the catheter becomes irritated, swollen, red, or tender, or there is pus draining from it. · Urine is leaking from the place where the catheter enters your body. Follow-up care is a key part of your treatment and safety. Be sure to make and go to all appointments, and call your doctor if you are having problems. It's also a good idea to know your test results and keep a list of the medicines you take.   Where can you learn more? Go to http://jhonatan-mayda.info/. Enter C910 in the search box to learn more about \"Learning About Urinary Catheter Care to Prevent Infection. \"  Current as of: May 12, 2017  Content Version: 11.4  © 1289-6376 [employer]. Care instructions adapted under license by [employer] (which disclaims liability or warranty for this information). If you have questions about a medical condition or this instruction, always ask your healthcare professional. [address] any warranty or liability for your use of this information. Laxative, Stimulant Combination (By mouth)   Treats constipation by helping you have a bowel movement. Brand Name(s): Colace, Doc-Q-Lax, Dok Plus, [facility] Stool Softener & Laxative, Good Sense Stimulant Laxative Plus, Laxacin, Medi-Laxx, Allyssa-Colace, [facility] Laxative and Stool Softener, [facility] P Col-Rite, Senexon-S, Senna-S, Senna-Time S, SennaLax-S, SennaPrompt   There may be other brand names for this medicine. When This Medicine Should Not Be Used: You should not use this medicine if you have had an allergic reaction to senna, sennosides, docusate, casanthranol, or psyllium. Some brand names for these medicines are Correctol® stool softener, Ex-Lax®, Colace®, or Metamucil®. Make sure your doctor knows if you are allergic to any other laxative medicines. How to Use This Medicine:   Tablet, Liquid Filled Capsule, Liquid, Granule, Capsule, Powder for Suspension  · Your doctor will tell you how much medicine to use. Do not use more than directed. · If you have had a sudden change in your bowel movements in the past two weeks, ask your doctor before using this medicine. · Follow the instructions on the medicine label if you are using this medicine without a prescription. · Drink a full glass of water when you take this medicine. One full glass of water is about 8 ounces or 1 cup.  Drinking 6 to 8 full glasses of water every day will help keep your bowel movements soft. · You might need to mix the granules or powder with water before you take each dose. Drink this mixture right away. Do not swallow the granules or powder dry unless the directions say you can. · Measure the oral liquid medicine with a marked measuring spoon, oral syringe, or medicine cup. · Use this medicine at bedtime, unless your doctor tells you otherwise. If a dose is missed:   · Take a dose as soon as you remember. If it is almost time for your next dose, wait until then and take a regular dose. Do not take extra medicine to make up for a missed dose. How to Store and Dispose of This Medicine:   · Store the medicine in a closed container at room temperature, away from heat, moisture, and direct light. · Ask your pharmacist, doctor, or health caregiver about the best way to dispose of any outdated medicine or medicine no longer needed. · Keep all medicine out of the reach of children. Never share your medicine with anyone. Drugs and Foods to Avoid:   Ask your doctor or pharmacist before using any other medicine, including over-the-counter medicines, vitamins, and herbal products. · Make sure your doctor knows if you are also using mineral oil. · Some laxatives need to be taken 2 hours before or 2 hours after other medicines. If you need to take any other medicine, ask your health caregiver if you need to follow a special schedule. Warnings While Using This Medicine:   · Make sure your doctor knows if you are pregnant or breast feeding. · Do not use this medicine if you have stomach pain, nausea, or vomiting, unless your health caregiver tells you to use it. · If you do not have a bowel movement after using this medicine, talk to your doctor. Most people will have a bowel movement within 6 to 12 hours after using this laxative. · You should not use this laxative for more than 1 week unless your doctor says it is okay. Laxatives may be habit-forming and can harm your bowels if you use them too long. Possible Side Effects While Using This Medicine:   Call your doctor right away if you notice any of these side effects:  · Bleeding from your rectum. · Skin rash. · Urine turns a different color. If you notice these less serious side effects, talk with your doctor:   · Diarrhea, cramps, nausea, burping. If you notice other side effects that you think are caused by this medicine, tell your doctor. Call your doctor for medical advice about side effects. You may report side effects to FDA at 3-141-FDA-4177  © 2017 [address] Information is for End User's use only and may not be sold, redistributed or otherwise used for commercial purposes. The above information is an  only. It is not intended as medical advice for individual conditions or treatments. Talk to your doctor, nurse or pharmacist before following any medical regimen to see if it is safe and effective for you. Ciprofloxacin (By mouth)   Ciprofloxacin ([id number]-[id number]-a-sin)  Treats infections and plague. This medicine is a quinolone antibiotic. Brand Name(s): Cipro   There may be other brand names for this medicine. When This Medicine Should Not Be Used: This medicine is not right for everyone. Do not use it if you had an allergic reaction to ciprofloxacin or to similar medicines. How to Use This Medicine:   Liquid, Tablet, Long Acting Tablet  · Your doctor will tell you how much medicine to use. Do not use more than directed. Take this medicine at the same time each day. · You may take this medicine with or without food. Do not take this medicine with only a source of calcium, including milk, yogurt, or juice that contains added calcium. You may have foods or drinks that contain calcium as part of a larger meal.  · Swallow the extended-release tablet whole. Do not crush, break, or chew it.   · Oral liquid: Shake for at least 15 seconds just before each use. The liquid has small beads floating in it. Do not chew the beads when you drink the liquid. Measure the oral liquid medicine with a marked measuring spoon, oral syringe, or medicine cup. · Tablet: Swallow whole. Do not break, crush, or chew it. · Drink extra fluids so you will urinate more often and help prevent kidney problems. · Take all of the medicine in your prescription to clear up your infection, even if you feel better after the first few doses. · This medicine should come with a Medication Guide. Ask your pharmacist for a copy if you do not have one. · Missed dose: Take a dose as soon as you remember. If it is almost time for your next dose, wait until then and take a regular dose. Do not take extra medicine to make up for a missed dose. · Store the medicine in a closed container at room temperature, away from heat, moisture, and direct light. Throw away any leftover liquid medicine after 14 days. Drugs and Foods to Avoid:   Ask your doctor or pharmacist before using any other medicine, including over-the-counter medicines, vitamins, and herbal products. · Do not use this medicine together with tizanidine. · Some foods and medicines can affect how ciprofloxacin works.  Tell your doctor if you are using any of the following:  ¨ Clozapine, cyclosporine, duloxetine, lidocaine, methotrexate, olanzapine, pentoxifylline, phenytoin, probenecid, ropinirole, sildenafil, theophylline, zolpidem  ¨ Antibiotic (including azithromycin, clarithromycin, erythromycin)  ¨ Blood thinner (including warfarin)  ¨ Diabetes medicine (including glimepiride, glyburide)  ¨ Medicine for depression or mental illness  ¨ Medicine for heart rhythm problems (including amiodarone, procainamide, quinidine, sotalol)  ¨ NSAID pain medicine (including aspirin, celecoxib, diclofenac, ibuprofen, naproxen)  ¨ Steroid medicine (including hydrocortisone, methylprednisolone, prednisone)  · Take ciprofloxacin at least 2 hours before or 6 hours after you take didanosine buffered tablets for oral suspension or the pediatric powder for oral suspension, sucralfate, or antacids, multivitamins, or other products containing aluminum, magnesium, lanthanum, sevelamer, iron, or zinc.  · This medicine slows the digestion of caffeine, so it might affect you for longer than normal.  Warnings While Using This Medicine:   · Tell your doctor if you are pregnant or breastfeeding, or if you have kidney disease, liver disease, diabetes, heart disease, myasthenia gravis, or a history of heart rhythm problems (including prolonged QT interval), nerve problems, or seizures. Tell your doctor if you have ever had tendon or joint problems, including rheumatoid arthritis, or if you have received a transplant. · This medicine may cause the following problems:  ¨ Tendinitis and tendon rupture (which may happen after treatment ends)  ¨ Liver damage  ¨ Nerve damage in the arms or legs  ¨ Heart rhythm changes  ¨ Changes in blood sugar levels  · This medicine may make you dizzy, drowsy, or lightheaded. Do not drive or do anything else that could be dangerous until you know how this medicine affects you. · This medicine can cause diarrhea. Call your doctor if the diarrhea becomes severe, does not stop, or is bloody. Do not take any medicine to stop diarrhea until you have talked to your doctor. Diarrhea can occur 2 months or more after you stop taking this medicine. · This medicine may make your skin more sensitive to sunlight. Wear sunscreen. Do not use sunlamps or tanning beds. · Call your doctor if your symptoms do not improve or if they get worse. · Keep all medicine out of the reach of children. Never share your medicine with anyone.   Possible Side Effects While Using This Medicine:   Call your doctor right away if you notice any of these side effects:  · Allergic reaction: Itching or hives, swelling in your face or hands, swelling or tingling in your mouth or throat, chest tightness, trouble breathing  · Blistering, peeling, red skin rash  · Dark urine, pale stools, nausea, vomiting, loss of appetite, stomach pain, yellow skin or eyes  · Diarrhea which may contain blood  · Fainting, dizziness, or lightheadedness  · Fast, slow, or uneven heartbeat  · Numbness, tingling, weakness, or burning pain in your hands, arms, legs, or feet  · Pain, stiffness, swelling, or bruises around your ankle, leg, shoulder, or other joints  · Seizures, severe headache, unusual thoughts or behaviors, trouble sleeping, feeling anxious, confused, or depressed, seeing, hearing, or feeling things that are not there  · Unusual bleeding, bruising, or weakness  If you notice other side effects that you think are caused by this medicine, tell your doctor. Call your doctor for medical advice about side effects. You may report side effects to FDA at [phone]  © 2017 [facility] Information is for End User's use only and may not be sold, redistributed or otherwise used for commercial purposes. The above information is an  only. It is not intended as medical advice for individual conditions or treatments. Talk to your doctor, nurse or pharmacist before following any medical regimen to see if it is safe and effective for you. Phenazopyridine (By mouth)   Phenazopyridine (jaf-cp-hhf-PIR-i-pamela)  Relieves symptoms caused by urinary tract infections and other urinary problems. Brand Name(s): Azo Standard, Azo Urinary Pain Relief, Azo Urinary Tract Health, Baridium, Leader Urinary Pain Relief, Preferred Urinary Pain Relief, Pyridium, [facility] [facility] Urinary Pain Relief, Urinary Pain Relief, Woman's Wellbeing UTI Relief   There may be other brand names for this medicine. When This Medicine Should Not Be Used: You should not use this medicine if you have had an allergic reaction to phenazopyridine.   How to Use This Medicine:   Tablet  · Your doctor will tell you how much to take and how often. · Swallow the tablets whole, do not crush or chew. · You may take the medicine with food to avoid an upset stomach. If a dose is missed:   · Take the missed dose as soon as possible. · Skip the missed dose if it is almost time for your next regular dose. · You should not use two doses at the same time. How to Store and Dispose of This Medicine:   · Store at room temperature, away from moisture and direct light. · Ask your pharmacist, doctor, or health caregiver about the best way to dispose of any outdated medicine or medicine no longer needed. · Keep all medicine out of the reach of children. Drugs and Foods to Avoid:      Ask your doctor or pharmacist before using any other medicine, including over-the-counter medicines, vitamins, and herbal products. Warnings While Using This Medicine:   · Check with your doctor before taking phenazopyridine if you have kidney or liver problems or are pregnant or breastfeeding. · This medicine can turn urine a red or orange color. This is normal.  · This medicine may stain soft contact lenses. You may not want to wear your contacts while taking this medicine. Possible Side Effects While Using This Medicine:   Call your doctor right away if you notice any of these side effects:  · Skin rash or hives, trouble breathing  · Yellowing of the skin or eyes  If you notice these less serious side effects, talk with your doctor:   · Indigestion or stomach upset  · Dizziness  · Headache  If you notice other side effects that you think are caused by this medicine, tell your doctor. Call your doctor for medical advice about side effects. You may report side effects to FDA at 7-606-OGL-8071  © 2017 [facility] Information is for End User's use only and may not be sold, redistributed or otherwise used for commercial purposes. The above information is an  only.  It is not intended as medical advice for individual conditions or treatments. Talk to your doctor, nurse or pharmacist before following any medical regimen to see if it is safe and effective for you. Oxycodone, Rapid Release (By mouth)   Oxycodone Hydrochloride (xh-y-VJT-done ashley-droe-KLOR-abhishek)  Treats moderate to severe pain. This medicine is a narcotic pain reliever. Brand Name(s): Oxaydo, Oxy IR, Roxicodone   There may be other brand names for this medicine. When This Medicine Should Not Be Used: This medicine is not right for everyone. Do not use it if you had an allergic reaction to oxycodone, codeine, hydrocodone, dihydrocodeine, or morphine, or you have a stomach or bowel blockage. How to Use This Medicine:   Capsule, Liquid, Tablet  · Take your medicine as directed. Your dose may need to be changed several times to find what works best for you. · An overdose can be dangerous. Follow directions carefully so you do not get too much medicine at one time. · Oral liquid: Measure the oral liquid medicine with a marked measuring spoon, oral syringe, or medicine cup. · Oxaydo® tablet: Swallow it whole with enough water to swallow it completely. Do not break, crush, chew, or dissolve it. Do not wet the tablet before you put it in your mouth. · This medicine should come with a Medication Guide. Ask your pharmacist for a copy if you do not have one. · Missed dose: Take a dose as soon as you remember. If it is almost time for your next dose, wait until then and take a regular dose. Do not take extra medicine to make up for a missed dose. · Store the medicine in a closed container at room temperature, away from heat, moisture, and direct light. Store the medicine in a secure place to prevent others from getting it. Ask your pharmacist about the best way to dispose of medicine you do not use.   Drugs and Foods to Avoid:   Ask your doctor or pharmacist before using any other medicine, including over-the-counter medicines, vitamins, and herbal products. · Do not use this medicine if you are using or have used an MAO inhibitor within the past 14 days. · Some medicines can affect how oxycodone works. Tell your doctor if you are using any of the following:  ¨ Amiodarone, carbamazepine, erythromycin, ketoconazole, phenytoin, quinidine, rifampin, ritonavir  ¨ Diuretic (water pill)  ¨ Medicine to treat depression or anxiety  ¨ Medicine to treat migraine headaches  ¨ Phenothiazine medicine  · Tell your doctor if you use anything else that makes you sleepy. Some examples are allergy medicine, narcotic pain medicine, and alcohol. Tell your doctor if you are using buprenorphine, butorphanol, nalbuphine, pentazocine, or a muscle relaxer. · Do not drink alcohol while you are using this medicine. Warnings While Using This Medicine:   · Tell your doctor if you are pregnant or breastfeeding, or if you have kidney disease, liver disease, heart disease, low blood pressure, lung disease or breathing problems (such as asthma, COPD), scoliosis, an enlarged prostate or trouble urinating, an underactive thyroid, Lud[place] disease, gallbladder or pancreas problems, or digestion problems. Tell your doctor if you have a history of head injury, brain tumor, mental health problems, seizures, or alcohol or drug addiction. · This medicine may cause the following problems:  ¨ High risk of overdose, which can lead to death  ¨ Respiratory depression (serious breathing problem that can be life-threatening)  ¨ Serotonin syndrome, when used with certain medicines  · This medicine may make you dizzy, drowsy, or faint. Do not drive or do anything else that could be dangerous until you know how this medicine affects you. Sit or lie down if you feel dizzy. Stand up carefully. · This medicine can be habit-forming. Do not use more than your prescribed dose. Call your doctor if you think your medicine is not working. · Do not stop using this medicine suddenly.  Your doctor will need to slowly decrease your dose before you stop it completely. · This medicine may cause constipation, especially with long-term use. Ask your doctor if you should use a laxative to prevent and treat constipation. Drink plenty of liquids to help avoid constipation. · This medicine could cause infertility. Talk with your doctor before using this medicine if you plan to have children. · Keep all medicine out of the reach of children. Never share your medicine with anyone. Possible Side Effects While Using This Medicine:   Call your doctor right away if you notice any of these side effects:  · Allergic reaction: Itching or hives, swelling in your face or hands, swelling or tingling in your mouth or throat, chest tightness, trouble breathing  · Anxiety, restlessness, fast heartbeat, fever, sweating, muscle spasms, twitching, nausea, vomiting, diarrhea, seeing or hearing things that are not there  · Blue lips, fingernails, or skin, trouble breathing  · Extreme dizziness or weakness, shallow breathing, slow heartbeat, sweating, cold or clammy skin, seizures  · Lightheadedness, dizziness, fainting  · Severe constipation, stomach pain  If you notice these less serious side effects, talk with your doctor:   · Mild constipation  · Sleepiness, tiredness  If you notice other side effects that you think are caused by this medicine, tell your doctor. Call your doctor for medical advice about side effects. You may report side effects to FDA at 0-928-FDA-7693  © 2017 [address] Information is for End User's use only and may not be sold, redistributed or otherwise used for commercial purposes. The above information is an  only. It is not intended as medical advice for individual conditions or treatments. Talk to your doctor, nurse or pharmacist before following any medical regimen to see if it is safe and effective for you.

## 2018-01-17 NOTE — OP NOTES
[facility] [facility] REPORT    Name:RYLEE DEUTSCH SR.  MR#: [id number]  : 1950  ACCOUNT #: [de-identified]   DATE OF SERVICE: 2018    PREOPERATIVE DIAGNOSIS:  Right urinary tract stones. POSTOPERATIVE DIAGNOSIS:  Right urinary tract stones. PROCEDURE PERFORMED:  Rigid and flexible ureteroscopic laser lithotripsy with stent placement. SURGEON: Marcia Benavidez Junior, MD    ASSISTANT:  none     ANESTHESIA:  General by LMA. ESTIMATED BLOOD LOSS:  None. SURGICAL SPECIMEN:  Intact distal right ureteral stone, fragmented right kidney stone. These were collected in 2 separate specimen containers and given to the patient's family in the PACU waiting room. TUBES AND DRAINS:  A 6-Kuwaiti x 24 cm double pigtail stent, 20-Kuwaiti Gregory catheter to leg bag. COMPLICATIONS:  None. IMPLANTS:  none     INDICATIONS FOR PROCEDURE:  A 78year-old gentleman with colicky right flank pain of 6 days' duration initially presenting to the emergency room at [facility] on 01/10/2018 where CT scan imaging showed a 3 mm stone obstructing the distal right ureter associated with marked right-sided hydronephrosis and perinephric stranding. There is a 7 mm stone evident in the mid portion of the right kidney. There were no stone densities or signs of urinary tract obstruction in the left urinary tract. The patient has been experiencing daily colicky pain and ultrasound imaging on day of admission shows a marked right-sided hydronephrosis indicating persistent obstruction in the right urinary tract. The patient is, therefore, brought to the operating room for endoscopic removal of the obstructing stone and opportunistic removal of the nonsymptomatic stone in the right kidney. DESCRIPTION OF OPERATION.   After establishing adequate general anesthesia by LMA, the patient was placed in the dorsal lithotomy position with legs suspended in Janes stirrups cushioned with soft foam rubber padding. The external genitalia were then prepped with antibiotic soap and solution and draped in a sterile manner. A 20-Beninese cystoscope was passed under direct vision. Inspection of the urethra and bladder showed normal urothelium throughout. There were no strictures, stones, tumors, or diverticula evident. The right ureteral orifice was normal in appearance, not edematous and no ecchymosis was evident. There were no signs of an efflux from the right orifice, however. A Glidewire was manipulated into the right ureter through the right ureteral orifice meeting obstruction approximately 1 inch into the ureter. With the stone identified at that position on the preoperative imaging studies, the wire was manipulated several times before a gush of brownish mucoid debris burst into the bladder lumen allowing the stone trapped in the ureter to move and permit passage of the Glidewire beyond the stone up to and coiling within the right renal pelvis. With the wire securely in place, the cystoscope was removed leaving the wire in place. A dilating balloon was then passed over the wire and the distal ureter was dilated to 18 Western Val using a handheld piston syringe, encountering no significant resistance with the expansion of the balloon within the ureter. Leaving the Glidewire in place and removing the dilating balloon, a cystoscope sheath was passed over the Glidewire and the Glidewire was passed into the working channel of a rigid ureteroscope and the rigid ureteroscope was then passed into the ureter through the cystoscope sheath, which was serving as a rigid conduit facilitating easy passage of the ureteroscope. The ureteroscope entered the ureter easily and the stone identified preoperatively was now visualized directly successfully trapped within the confines of a 4-wire 1.9 Western Val nitinol basket and removed as an intact specimen.   This was passed off the field later given to the family in the waiting room. With the obstructing stone removed from the distal ureter, the Glidewire was left and placed the ureteroscope and cystoscope sheath was removed and a 13-15 Sammarinese ureteral access sheath was passed over the Glidewire, positioned so that the tip rested within the renal pelvis on the right side. The obturator and Glidewire were removed, leaving an open channel through which a 7-Sammarinese flexible ureteroscope was passed. The 7 mm stone identified in the mid portion of the right kidney was identified visually successfully trapped within the confines of the same 1.9 Western Val nitinol basket and moved out of the mid pole and positioned into the upper most marilee of the kidney. The patient was then placed in the Trendelenburg position and the stone was fragmented into 4 pieces with a holmium laser passing the energy through a 200 micron fiber. The stone fragments were then removed individually with sequential passages of the ureteroscope in and out of the right kidney. Contrast solution was then injected into the collecting system, which showed no extravasation. A Glidewire was put back in place and the ureteroscope and access sheath were removed. A 6-Sammarinese x 24 cm double pigtail stent was passed over the Glidewire and positioned so that the proximal coil formed within the renal pelvis and distal coil formed within the lumen of the bladder. A nylon tether attached to the bladder curl was brought out the urethra and fixed to the shaft skin of the penis with Tegaderm tape. A 20-Sammarinese Gregory catheter was then passed in the bladder, balloon inflated to 7 mL volume with saline solution, placed to leg bag gravity drainage. The procedure overall was uncomplicated, well tolerated by the patient, who was taken from the operating room to the recovery room in good condition.       MD BRENT Chavez / CHRISTOPHER  D: 01/16/2018 20:04     T: 01/16/2018 21:24  JOB #: [id number]

## 2018-01-17 NOTE — ANESTHESIA POSTPROCEDURE EVALUATION
Post-Anesthesia Evaluation and Assessment    Patient: Rodney Honeycutt Sr. MRN: [id number]  SSN: [id number]    YOB: 1950  Age: 78 y.o. Sex: male       Cardiovascular Function/Vital Signs  Visit Vitals    /88    Pulse (!) 123    Temp 36.8 °C (98.3 °F)    Resp 15    Ht 5' 10\" (1.778 m)    Wt 92.1 kg (203 lb 2 oz)    SpO2 94%    BMI 29.15 kg/m2       Patient is status post general anesthesia for Procedure(s):  CYSTOSCOPY URETEROSCOPY/LASER LITHOTRYPSY/STENT PLACEMENT. Nausea/Vomiting: None    Postoperative hydration reviewed and adequate. Pain:  Pain Scale 1: Numeric (0 - 10) (01/16/18 1936)  Pain Intensity 1: 10 (01/16/18 1936)   Managed    Neurological Status:   Neuro (WDL): Within Defined Limits (01/16/18 1936)   At baseline    Mental Status and Level of Consciousness: Alert and oriented     Pulmonary Status:   O2 Device: Nasal cannula (01/16/18 1937)   Adequate oxygenation and airway patent    Complications related to anesthesia: None    Post-anesthesia assessment completed.  No concerns    Signed By: Sue Ibarra CRNA     January 16, 2018

## 2018-01-17 NOTE — PERIOP NOTES
Discharge instructions given to patient and wife verbally and written with understanding verbalized. Included instructions and demonstration for care of indwelling urinary catheter along with how to remove.

## 2018-01-18 LAB
ATRIAL RATE: 83 BPM
CALCULATED P AXIS, ECG09: 38 DEGREES
CALCULATED R AXIS, ECG10: -12 DEGREES
CALCULATED T AXIS, ECG11: 4 DEGREES
DIAGNOSIS, 93000: NORMAL
P-R INTERVAL, ECG05: 144 MS
Q-T INTERVAL, ECG07: 362 MS
QRS DURATION, ECG06: 82 MS
QTC CALCULATION (BEZET), ECG08: 425 MS
VENTRICULAR RATE, ECG03: 83 BPM

## 2018-01-25 ENCOUNTER — OFFICE VISIT (OUTPATIENT)
Dept: UROLOGY | Age: 68
End: 2018-01-25

## 2018-01-25 VITALS
OXYGEN SATURATION: 99 % | HEART RATE: 64 BPM | TEMPERATURE: 97.2 F | WEIGHT: 194 LBS | DIASTOLIC BLOOD PRESSURE: 75 MMHG | SYSTOLIC BLOOD PRESSURE: 141 MMHG | HEIGHT: 70 IN | BODY MASS INDEX: 27.77 KG/M2

## 2018-01-25 DIAGNOSIS — N20.0 KIDNEY STONES: Primary | ICD-10-CM

## 2018-01-25 NOTE — PROGRESS NOTES
Mr. Jaya Hussein has a reminder for a \"due or due soon\" health maintenance. I have asked that he contact his primary care provider for follow-up on this health maintenance.

## 2018-01-25 NOTE — MR AVS SNAPSHOT
[address] [facility] Rajesh A [address] [address] 
[phone] Patient: Laureano Dinh. MRN: [id number] NID:[id number] Visit Information Date & Time Provider Department Dept. Phone Encounter #  
 1/25/2018 10:30 AM Mariela Gonsalez [facility] [phone] [id number] Your Appointments 2/22/2018 10:45 AM  
ULTRASOUND with Sandra Gómez MD  
[facility] [address] [place] [address]) Appt Note: renal us [address] Rajesh A [address] [address]  
[phone] [phone]  
  
    
 4/26/2018 10:45 AM  
ULTRASOUND with Sandra Gómez MD  
[facility] [address] [place] [address]) Appt Note: PVR/PSA  
 [address] Rajesh A [address] [address]  
[phone] Upcoming Health Maintenance Date Due Hepatitis C Screening 1950 DTaP/Tdap/Td series (1 - Tdap) 6/3/1972 ZOSTER VACCINE AGE 60> 4/3/2011 MEDICARE YEARLY EXAM 6/3/2016 EYE EXAM RETINAL OR DILATED Q1 5/4/2017 FOOT EXAM Q1 11/16/2017 HEMOGLOBIN A1C Q6M 4/30/2018 GLAUCOMA SCREENING Q2Y 5/4/2018 MICROALBUMIN Q1 10/31/2018 LIPID PANEL Q1 10/31/2018 COLONOSCOPY 7/1/2023 Allergies as of 1/25/2018  Review Complete On: 1/25/2018 By: Victoria Hernandez LPN Severity Noted Reaction Type Reactions Neurontin [Gabapentin]  08/18/2015    Other (comments)  
 confusion Topamax [Topiramate]  08/18/2015    Other (comments)  
 confusion Current Immunizations  Reviewed on 11/17/2015 Name Date Influenza High Dose Vaccine PF 10/31/2017, 10/30/2016 Influenza Vaccine PF 11/17/2015 Pneumococcal Conjugate (PCV-13) 11/16/2016 Pneumococcal Polysaccharide (PPSV-23) 10/31/2017 Not reviewed this visit You Were Diagnosed With   
  
 Codes Comments Kidney stones    -  Primary ICD-10-CM: N20.0 ICD-9-CM: 592.0 Vitals BP Pulse Temp Height(growth percentile) Weight(growth percentile) SpO2  
 141/75 (BP 1 Location: Left arm, BP Patient Position: Sitting) 64 97.2 °F (36.2 °C) 5' 10\" (1.778 m) 194 lb (88 kg) 99% BMI Smoking Status 27.84 kg/m2 Former Smoker Vitals History BMI and BSA Data Body Mass Index Body Surface Area  
 27.84 kg/m 2 2.08 m 2 Preferred Pharmacy Pharmacy Name Phone [id number] [phone] Your Updated Medication List  
  
   
This list is accurate as of: 1/25/18 11:53 AM.  Always use your most recent med list.  
  
  
  
  
 ALEVE 220 mg tablet Generic drug:  naproxen sodium Take 220 mg by mouth two (2) times daily as needed. atorvastatin 40 mg tablet Commonly known as:  LIPITOR Take 1 Tab by mouth daily. CENTRUM SILVER PO Take  by mouth.  
  
 citalopram 20 mg tablet Commonly known as:  Rosauraduran Blanchard Take 1 Tab by mouth daily. esomeprazole 40 mg capsule Commonly known as:  Rogue Jagjit Take 1 Cap by mouth daily. fenofibrate nanocrystallized 145 mg tablet Commonly known as:  TRICOR  
1 qd  
  
 finasteride 5 mg tablet Commonly known as:  PROSCAR  
take 1 tablet by mouth once daily FLEXERIL 10 mg tablet Generic drug:  cyclobenzaprine Take  by mouth three (3) times daily as needed for Muscle Spasm(s). HYDROcodone-acetaminophen  mg tablet Commonly known as:  Zilphia Deck Take 1 Tab by mouth.  
  
 loratadine 10 mg tablet Commonly known as:  Lopez Cousin Take 10 mg by mouth daily as needed. oxyCODONE IR 5 mg immediate release tablet Commonly known as:  Sharin Res Take 1 Tab by mouth every four (4) hours as needed for Pain. Max Daily Amount: 30 mg.  
  
 pioglitazone 30 mg tablet Commonly known as:  ACTOS  
1 qd  
  
 quinapril 20 mg tablet Commonly known as:  ACCUPRIL Take 1 Tab by mouth nightly. tamsulosin 0.4 mg capsule Commonly known as:  FLOMAX  
take 1 capsule by mouth once daily  
  
 zolpidem 10 mg tablet Commonly known as:  AMBIEN We Performed the Following AMB POC URINALYSIS DIP STICK AUTO W/O MICRO [05998 CPT(R)] Patient Instructions Kidney Stone: Care Instructions Your Care Instructions Kidney stones are formed when salts, minerals, and other substances normally found in the urine clump together. They can be as small as grains of sand or, rarely, as large as golf balls. While the stone is traveling through the ureter, which is the tube that carries urine from the kidney to the bladder, you will probably feel pain. The pain may be mild or very severe. You may also have some blood in your urine. As soon as the stone reaches the bladder, any intense pain should go away. If a stone is too large to pass on its own, you may need a medical procedure to help you pass the stone. The doctor has checked you carefully, but problems can develop later. If you notice any problems or new symptoms, get medical treatment right away. Follow-up care is a key part of your treatment and safety. Be sure to make and go to all appointments, and call your doctor if you are having problems. It's also a good idea to know your test results and keep a list of the medicines you take. How can you care for yourself at home? · Drink plenty of fluids, enough so that your urine is light yellow or clear like water. If you have kidney, heart, or liver disease and have to limit fluids, talk with your doctor before you increase the amount of fluids you drink. · Take pain medicines exactly as directed. Call your doctor if you think you are having a problem with your medicine. ¨ If the doctor gave you a prescription medicine for pain, take it as prescribed.  
¨ If you are not taking a prescription pain medicine, ask your doctor if you can take an over-the-counter medicine. Read and follow all instructions on the label. · Your doctor may ask you to strain your urine so that you can collect your kidney stone when it passes. You can use a kitchen strainer or a tea strainer to catch the stone. Store it in a plastic bag until you see your doctor again. Preventing future kidney stones Some changes in your diet may help prevent kidney stones. Depending on the cause of your stones, your doctor may recommend that you: · Drink plenty of fluids, enough so that your urine is light yellow or clear like water. If you have kidney, heart, or liver disease and have to limit fluids, talk with your doctor before you increase the amount of fluids you drink. · Limit coffee, tea, and alcohol. Also avoid grapefruit juice. · Do not take more than the recommended daily dose of vitamins C and D. 
· Avoid antacids such as Gaviscon, Maalox, Mylanta, or Tums. · Limit the amount of salt (sodium) in your diet. · Eat a balanced diet that is not too high in protein. · Limit foods that are high in a substance called oxalate, which can cause kidney stones. These foods include dark green vegetables, rhubarb, chocolate, wheat bran, nuts, cranberries, and beans. When should you call for help? Call your doctor now or seek immediate medical care if: 
? · You cannot keep down fluids. ? · Your pain gets worse. ? · You have a fever or chills. ? · You have new or worse pain in your back just below your rib cage (the flank area). ? · You have new or more blood in your urine. ? Watch closely for changes in your health, and be sure to contact your doctor if: 
? · You do not get better as expected. Where can you learn more? Go to http://jhonatan-mayda.info/. Enter X471 in the search box to learn more about \"Kidney Stone: Care Instructions. \" Current as of: May 12, 2017 Content Version: 11.4 © 3590-1124 [employer]. Care instructions adapted under license by [employer] (which disclaims liability or warranty for this information). If you have questions about a medical condition or this instruction, always ask your healthcare professional. [address] any warranty or liability for your use of this information. Introducing [facility]! Mark Wang introduces [employer] patient portal. Now you can access parts of your medical record, email your doctor's office, and request medication refills online. 1. In your internet browser, go to [email or web address] 2. Click on the First Time User? Click Here link in the Sign In box. You will see the New Member Sign Up page. 3. Enter your [employer] Access Code exactly as it appears below. You will not need to use this code after youve completed the sign-up process. If you do not sign up before the expiration date, you must request a new code. · [employer] Access Code: [email or web address] Expires: 4/25/2018 10:53 AM 
 
4. Enter the last four digits of your Social Security Number (xxxx) and Date of Birth (mm/dd/yyyy) as indicated and click Submit. You will be taken to the next sign-up page. 5. Create a [employer] ID. This will be your [employer] login ID and cannot be changed, so think of one that is secure and easy to remember. 6. Create a [employer] password. You can change your password at any time. 7. Enter your Password Reset Question and Answer. This can be used at a later time if you forget your password. 8. Enter your e-mail address. You will receive e-mail notification when new information is available in [address] 9. Click Sign Up. You can now view and download portions of your medical record. 10. Click the Download Summary menu link to download a portable copy of your medical information.  
 
If you have questions, please visit the Frequently Asked Questions section of the [employer]. Remember, [employer] is NOT to be used for urgent needs. For medical emergencies, dial 911. Now available from your iPhone and Android! Please provide this summary of care documentation to your next provider. Your primary care clinician is listed as Katherine Alvares. If you have any questions after today's visit, please call [phone].

## 2018-01-25 NOTE — PATIENT INSTRUCTIONS

## 2018-01-26 NOTE — PROGRESS NOTES
Sara Gunderson. 78 y.o. male     Mr. Liz Salas seen today for kidney stone disease follow-up here today for removal of stent from the right ureter put in place on 16 January 2018 at time of removal of a proximal right ureteral stone and right intrarenal stone     right-sided flank pain for the past 6 days presenting to the emergency room at [facility] on 10 January 2018 complaining of right flank pain finding a 1.3 mm stone obstructing the distal right ureter associated with right-sided hydronephrosis and perinephric stranding.  There are no stone densities evident in the left kidney nor signs of obstruction of the left urinary tract.  A 5 mm stone in the midpole region of the right kidney is noted incidentally. Patient has experienced ongoing colicky pain for the past week but has not experienced dysuria nor fever-no history of kidney stone disease prior to current episode      History of symptomatic BPH responding favorably to alpha-blocker and 5 alpha reductase inhibitor therapy      Patient is voiding with a forceful stream good control no daytime urgency frequency and no episodes of nocturia since beginning treatment with these medications          PSA 1.1 and August 2013  PSA 1.1 in August 2014  PSA 0.9 in September 2015  PSA 0.8 in October 2016  PSA 0.8 in April 2017      Testosterone 245      PVR 59 cc in October 2016   cc in April 2017  PVR 74 cc in October 2017      Review of Systems:    CNS: No headaches seizures syncope or visual changes  Respiratory: No lesion or shortness of breath  Cardiovascular:Hypertension  Intestinal:GERD no change in bowel habits  Urinary: Symptomatic BPH  Skeletal: no bone or joint pain  Endocrine:Large joint arthritisNo diabetes  Other:        Allergies:    Allergies   Allergen Reactions    Neurontin [Gabapentin] Other (comments)     confusion    Topamax [Topiramate] Other (comments)     confusion      Medications:    Current Outpatient Prescriptions Medication Sig Dispense Refill    oxyCODONE IR (ROXICODONE) 5 mg immediate release tablet Take 1 Tab by mouth every four (4) hours as needed for Pain. Max Daily Amount: 30 mg. 15 Tab 0    tamsulosin (FLOMAX) 0.4 mg capsule take 1 capsule by mouth once daily 30 Cap 3    zolpidem (AMBIEN) 10 mg tablet   0    finasteride (PROSCAR) 5 mg tablet take 1 tablet by mouth once daily 90 Tab 3    quinapril (ACCUPRIL) 20 mg tablet Take 1 Tab by mouth nightly. (Patient taking differently: Take 40 mg by mouth daily.) 90 Tab 4    fenofibrate nanocrystallized (TRICOR) 145 mg tablet 1 qd 90 Tab 4    naproxen sodium (ALEVE) 220 mg tablet Take 220 mg by mouth two (2) times daily as needed.  loratadine (CLARITIN) 10 mg tablet Take 10 mg by mouth daily as needed.  citalopram (CELEXA) 20 mg tablet Take 1 Tab by mouth daily. 90 Tab 3    esomeprazole (NEXIUM) 40 mg capsule Take 1 Cap by mouth daily. 90 Cap 3    FOLIC ACID/MULTIVIT-MIN/LUTEIN (CENTRUM SILVER PO) Take  by mouth.  cyclobenzaprine (FLEXERIL) 10 mg tablet Take  by mouth three (3) times daily as needed for Muscle Spasm(s).  pioglitazone (ACTOS) 30 mg tablet 1 qd 90 Tab 3    atorvastatin (LIPITOR) 40 mg tablet Take 1 Tab by mouth daily. 90 Tab 3    hydrocodone-acetaminophen (LORTAB)  mg per tablet Take 1 Tab by mouth.          Past Medical History:   Diagnosis Date    Asbestosis ([facility] [address]     Bilateral shoulder pain     Chronic airway obstruction, not elsewhere classified     Depressive disorder, not elsewhere classified     Diabetes mellitus ([facility] [address]     Esophagitis, unspecified     Generalized osteoarthrosis, involving multiple sites     GERD (gastroesophageal reflux disease)     Headache(784.0)     Hematuria     neg cysto    Hypercholesterolemia     Hypercholesterolemia     Hypertension     Mixed hyperlipidemia     Neck pain     Pancreatitis 6/2014    Rheumatic fever     w/o sequelae    Type II or unspecified type diabetes mellitus without mention of complication, not stated as uncontrolled     Unspecified hyperplasia of prostate with urinary obstruction and other lower urinary tract symptoms (LUTS)       Past Surgical History:   Procedure Laterality Date    HX APPENDECTOMY      HX COLONOSCOPY  7/26/2013    diverticulosis    HX OPEN REDUCTION INTERNAL FIXATION Right     clavicle fx     Family History   Problem Relation Age of Onset    Cancer Father     Cancer Other     Heart Disease Maternal Grandmother     Heart Disease Paternal Grandfather     Other Sister      brain hemorrhage        Physical Examination: Well-nourished mature male in no apparent distress      Tethered right ureteral stent removed today intact and unencrusted        Impression: Tone and stent free status post right ureteroscopic laser lithotripsy    Plan: Stent removal precautions            Maintain good oral hydration and avoid episodes of dehydration    rtc 4 weeks renal ultrasound imaging      More than 1/2 of this 15 minute visit was spent in counselling and coordination of care, as described above.   Alana Teran MD

## 2018-02-07 NOTE — BRIEF OP NOTE
BRIEF OPERATIVE NOTE    Date of Procedure: 1/16/2018   Preoperative Diagnosis: 3 mm distal right ureteral stone/ 7 mm right kidney stone  Postoperative Diagnosis: igor   Procedure(s):  CYSTOSCOPY, URETEROSCOPY/LASER LITHOTRYPSY/STENT PLACEMENT  Surgeon(s) and Role:      Valdez Beyer MD - Primary         Assistant Staff:       Surgical Staff:  Erin Krishna: Miguel Ángel Sanabria RN  : Anahi Hicks  Radiology Technician: Mercedes Phillip  Scrub Tech-1: Stefanie Hodge  Event Time In   Incision Start 1831   Incision Close 1930     Anesthesia: General   Estimated Blood Loss: none  Specimens: 3mm distal right ureteral stone/ right kidney stone fragments - given to wife in wt room  Findings: igor   Complications: none  Implants: none    6Fr x 24 cm stent  20 Fr Gregory catheter to leg bag    Valdez Beyer MD Verbalized Understanding/Simple: Patient demonstrates quick and easy understanding

## 2018-02-22 ENCOUNTER — HOSPITAL ENCOUNTER (OUTPATIENT)
Dept: LAB | Age: 68
Discharge: HOME | End: 2018-02-22
Payer: COMMERCIAL

## 2018-02-22 ENCOUNTER — OFFICE VISIT (OUTPATIENT)
Dept: UROLOGY | Age: 68
End: 2018-02-22

## 2018-02-22 VITALS
WEIGHT: 196 LBS | HEIGHT: 70 IN | HEART RATE: 74 BPM | SYSTOLIC BLOOD PRESSURE: 130 MMHG | DIASTOLIC BLOOD PRESSURE: 80 MMHG | OXYGEN SATURATION: 98 % | TEMPERATURE: 97.7 F | BODY MASS INDEX: 28.06 KG/M2

## 2018-02-22 DIAGNOSIS — N20.0 KIDNEY STONE: ICD-10-CM

## 2018-02-22 DIAGNOSIS — N20.0 KIDNEY STONE: Primary | ICD-10-CM

## 2018-02-22 DIAGNOSIS — N40.0 BENIGN PROSTATIC HYPERPLASIA, UNSPECIFIED WHETHER LOWER URINARY TRACT SYMPTOMS PRESENT: ICD-10-CM

## 2018-02-22 LAB
BILIRUB UR QL STRIP: NEGATIVE
CREAT SERPL-MCNC: 1.42 MG/DL (ref 0.6–1.3)
GLUCOSE UR-MCNC: NEGATIVE MG/DL
KETONES P FAST UR STRIP-MCNC: NEGATIVE MG/DL
PH UR STRIP: 5.5 [PH] (ref 4.6–8)
PROT UR QL STRIP: NEGATIVE
PSA SERPL-MCNC: 1.5 NG/ML (ref 0–4)
SP GR UR STRIP: 1.02 (ref 1–1.03)
UA UROBILINOGEN AMB POC: NORMAL (ref 0.2–1)
URINALYSIS CLARITY POC: CLEAR
URINALYSIS COLOR POC: YELLOW
URINE BLOOD POC: NEGATIVE
URINE LEUKOCYTES POC: NORMAL
URINE NITRITES POC: NEGATIVE

## 2018-02-22 PROCEDURE — 84153 ASSAY OF PSA TOTAL: CPT | Performed by: UROLOGY

## 2018-02-22 PROCEDURE — 82565 ASSAY OF CREATININE: CPT | Performed by: UROLOGY

## 2018-02-22 NOTE — PROGRESS NOTES
Mr. Shasta Gomez has a reminder for a \"due or due soon\" health maintenance. I have asked that he contact his primary care provider for follow-up on this health maintenance.

## 2018-02-22 NOTE — MR AVS SNAPSHOT
[address] [facility] Rajesh A [address] [address] 
[phone] Patient: Nidia Joy. MRN: [id number] JKE:[id number]/2/2293 Visit Information Date & Time Provider Department Dept. Phone Encounter #  
 2/22/2018 10:45 AM Mariela Tadeo [facility] [phone] [id number] Your Appointments 4/26/2018 10:45 AM  
ULTRASOUND with Nettie Ryder MD  
[facility] [address] [facility]) Appt Note: PVR/PSA  
 [address] Rajesh A [address] [address]  
[phone] [address] [place] [address] Upcoming Health Maintenance Date Due Hepatitis C Screening 1950 DTaP/Tdap/Td series (1 - Tdap) 6/3/1972 ZOSTER VACCINE AGE 60> 4/3/2011 MEDICARE YEARLY EXAM 6/3/2016 EYE EXAM RETINAL OR DILATED Q1 5/4/2017 FOOT EXAM Q1 11/16/2017 HEMOGLOBIN A1C Q6M 4/30/2018 GLAUCOMA SCREENING Q2Y 5/4/2018 MICROALBUMIN Q1 10/31/2018 LIPID PANEL Q1 10/31/2018 COLONOSCOPY 7/1/2023 Allergies as of 2/22/2018  Review Complete On: 2/22/2018 By: Denton Miguel Severity Noted Reaction Type Reactions Neurontin [Gabapentin]  08/18/2015    Other (comments)  
 confusion Topamax [Topiramate]  08/18/2015    Other (comments)  
 confusion Current Immunizations  Reviewed on 11/17/2015 Name Date Influenza High Dose Vaccine PF 10/31/2017, 10/30/2016 Influenza Vaccine PF 11/17/2015 Pneumococcal Conjugate (PCV-13) 11/16/2016 Pneumococcal Polysaccharide (PPSV-23) 10/31/2017 Not reviewed this visit You Were Diagnosed With   
  
 Codes Comments Kidney stone    -  Primary ICD-10-CM: N20.0 ICD-9-CM: 592.0 Vitals BP Pulse Temp Height(growth percentile) Weight(growth percentile) SpO2  
 130/80 (BP 1 Location: Left arm, BP Patient Position: Sitting) 74 97.7 °F (36.5 °C) (Oral) 5' 10\" (1.778 m) 196 lb (88.9 kg) 98% BMI Smoking Status 28.12 kg/m2 Former Smoker Vitals History BMI and BSA Data Body Mass Index Body Surface Area  
 28.12 kg/m 2 2.1 m 2 Preferred Pharmacy Pharmacy Name Phone [id number] [address] [address] [facility] [phone] Your Updated Medication List  
  
   
This list is accurate as of 2/22/18  1:06 PM.  Always use your most recent med list.  
  
  
  
  
 ALEVE 220 mg tablet Generic drug:  naproxen sodium Take 220 mg by mouth two (2) times daily as needed. atorvastatin 40 mg tablet Commonly known as:  LIPITOR Take 1 Tab by mouth daily. CENTRUM SILVER PO Take  by mouth.  
  
 citalopram 20 mg tablet Commonly known as:  Silva Pia Take 1 Tab by mouth daily. esomeprazole 40 mg capsule Commonly known as:  Sood Millet Take 1 Cap by mouth daily. fenofibrate nanocrystallized 145 mg tablet Commonly known as:  TRICOR  
1 qd  
  
 finasteride 5 mg tablet Commonly known as:  PROSCAR  
take 1 tablet by mouth once daily FLEXERIL 10 mg tablet Generic drug:  cyclobenzaprine Take  by mouth three (3) times daily as needed for Muscle Spasm(s). HYDROcodone-acetaminophen  mg tablet Commonly known as:  Clemetine Lubna Take 1 Tab by mouth.  
  
 loratadine 10 mg tablet Commonly known as:  Rollene Ranks Take 10 mg by mouth daily as needed. oxyCODONE IR 5 mg immediate release tablet Commonly known as:  Henry Carbon Take 1 Tab by mouth every four (4) hours as needed for Pain. Max Daily Amount: 30 mg.  
  
 pioglitazone 30 mg tablet Commonly known as:  ACTOS  
1 qd  
  
 quinapril 20 mg tablet Commonly known as:  ACCUPRIL Take 1 Tab by mouth nightly. tamsulosin 0.4 mg capsule Commonly known as:  FLOMAX  
take 1 capsule by mouth once daily  
  
 zolpidem 10 mg tablet Commonly known as:  AMBIEN We Performed the Following AMB POC URINALYSIS DIP STICK AUTO W/O MICRO [12457 CPT(R)] Patient Instructions Kidney Stone: Care Instructions Your Care Instructions Kidney stones are formed when salts, minerals, and other substances normally found in the urine clump together. They can be as small as grains of sand or, rarely, as large as golf balls. While the stone is traveling through the ureter, which is the tube that carries urine from the kidney to the bladder, you will probably feel pain. The pain may be mild or very severe. You may also have some blood in your urine. As soon as the stone reaches the bladder, any intense pain should go away. If a stone is too large to pass on its own, you may need a medical procedure to help you pass the stone. The doctor has checked you carefully, but problems can develop later. If you notice any problems or new symptoms, get medical treatment right away. Follow-up care is a key part of your treatment and safety. Be sure to make and go to all appointments, and call your doctor if you are having problems. It's also a good idea to know your test results and keep a list of the medicines you take. How can you care for yourself at home? · Drink plenty of fluids, enough so that your urine is light yellow or clear like water. If you have kidney, heart, or liver disease and have to limit fluids, talk with your doctor before you increase the amount of fluids you drink. · Take pain medicines exactly as directed. Call your doctor if you think you are having a problem with your medicine. ¨ If the doctor gave you a prescription medicine for pain, take it as prescribed. ¨ If you are not taking a prescription pain medicine, ask your doctor if you can take an over-the-counter medicine. Read and follow all instructions on the label. · Your doctor may ask you to strain your urine so that you can collect your kidney stone when it passes.  You can use a kitchen strainer or a tea strainer to catch the stone. Store it in a plastic bag until you see your doctor again. Preventing future kidney stones Some changes in your diet may help prevent kidney stones. Depending on the cause of your stones, your doctor may recommend that you: · Drink plenty of fluids, enough so that your urine is light yellow or clear like water. If you have kidney, heart, or liver disease and have to limit fluids, talk with your doctor before you increase the amount of fluids you drink. · Limit coffee, tea, and alcohol. Also avoid grapefruit juice. · Do not take more than the recommended daily dose of vitamins C and D. 
· Avoid antacids such as Gaviscon, Maalox, Mylanta, or Tums. · Limit the amount of salt (sodium) in your diet. · Eat a balanced diet that is not too high in protein. · Limit foods that are high in a substance called oxalate, which can cause kidney stones. These foods include dark green vegetables, rhubarb, chocolate, wheat bran, nuts, cranberries, and beans. When should you call for help? Call your doctor now or seek immediate medical care if: 
? · You cannot keep down fluids. ? · Your pain gets worse. ? · You have a fever or chills. ? · You have new or worse pain in your back just below your rib cage (the flank area). ? · You have new or more blood in your urine. ? Watch closely for changes in your health, and be sure to contact your doctor if: 
? · You do not get better as expected. Where can you learn more? Go to http://jhonatan-mayda.info/. Enter B315 in the search box to learn more about \"Kidney Stone: Care Instructions. \" Current as of: May 12, 2017 Content Version: 11.4 © 3252-1199 [employer]. Care instructions adapted under license by [employer] (which disclaims liability or warranty for this information).  If you have questions about a medical condition or this instruction, always ask your healthcare professional. [employer] disclaims any warranty or liability for your use of this information. Introducing [facility]! Peggy Heredia introduces A[employer] patient portal. Now you can access parts of your medical record, email your doctor's office, and request medication refills online. 1. In your internet browser, go to [email or web address] 2. Click on the First Time User? Click Here link in the Sign In box. You will see the New Member Sign Up page. 3. Enter your [employer]el Access Code exactly as it appears below. You will not need to use this code after youve completed the sign-up process. If you do not sign up before the expiration date, you must request a new code. · [employer] Access Code: [email or web address] Expires: 4/25/2018 10:53 AM 
 
4. Enter the last four digits of your Social Security Number (xxxx) and Date of Birth (mm/dd/yyyy) as indicated and click Submit. You will be taken to the next sign-up page. 5. Create a Asteel ID. This will be your Asteel login ID and cannot be changed, so think of one that is secure and easy to remember. 6. Create a [employer]el password. You can change your password at any time. 7. Enter your Password Reset Question and Answer. This can be used at a later time if you forget your password. 8. Enter your e-mail address. You will receive e-mail notification when new information is available in [address] 9. Click Sign Up. You can now view and download portions of your medical record. 10. Click the Download Summary menu link to download a portable copy of your medical information. If you have questions, please visit the Frequently Asked Questions section of the [employer] website. Remember, [employer]el is NOT to be used for urgent needs. For medical emergencies, dial 911. Now available from your iPhone and Android! Please provide this summary of care documentation to your next provider. Your primary care clinician is listed as Josseline Person. If you have any questions after today's visit, please call [phone].

## 2018-02-22 NOTE — PROGRESS NOTES
[facility]. 78 y.o. male     Mr. Kitty Rutherford seen today for kidney stone follow-up-here today for ultrasound imaging of the kidneys status post right ureteroscopic stone extraction in January 2018 following removal of an obstructing proximal right ureteral stone and incidental removal of the right kidney stone       right-sided flank pain for the past 6 days presenting to the emergency room at [facility] on 10 January 2018 complaining of right flank pain finding a 1.3 mm stone obstructing the distal right ureter associated with right-sided hydronephrosis and perinephric stranding.  There are no stone densities evident in the left kidney nor signs of obstruction of the left urinary tract.  A 5 mm stone in the midpole region of the right kidney is noted incidentally. Patient has experienced ongoing colicky pain for the past week but has not experienced dysuria nor fever-no history of kidney stone disease prior to current episode      History of symptomatic BPH responding favorably to alpha-blocker and 5 alpha reductase inhibitor therapy      Patient is voiding with a forceful stream good control no daytime urgency frequency and no episodes of nocturia since beginning treatment with these medications          PSA 1.1 and August 2013  PSA 1.1 in August 2014  PSA 0.9 in September 2015  PSA 0.8 in October 2016  PSA 0.8 in April 2017      Testosterone 245      PVR 59 cc in October 2016   cc in April 2017  PVR 74 cc in October 2017    Creatinine 2.0 in November 2018  Creatinine 1.36 in October 2017  [place] 1.39 in 2016      Review of Systems:    CNS: No headaches seizures syncope or visual changes  Respiratory: No lesion or shortness of breath  Cardiovascular:Hypertension  Intestinal:GERD no change in bowel habits  Urinary: Symptomatic BPH  Skeletal: no bone or joint pain  Endocrine:Large joint arthritisNo diabetes  Other:    Allergies:    Allergies   Allergen Reactions    Neurontin [Gabapentin] Other (comments)     confusion    Topamax [Topiramate] Other (comments)     confusion      Medications:    Current Outpatient Prescriptions   Medication Sig Dispense Refill    oxyCODONE IR (ROXICODONE) 5 mg immediate release tablet Take 1 Tab by mouth every four (4) hours as needed for Pain. Max Daily Amount: 30 mg. 15 Tab 0    tamsulosin (FLOMAX) 0.4 mg capsule take 1 capsule by mouth once daily 30 Cap 3    zolpidem (AMBIEN) 10 mg tablet   0    finasteride (PROSCAR) 5 mg tablet take 1 tablet by mouth once daily 90 Tab 3    quinapril (ACCUPRIL) 20 mg tablet Take 1 Tab by mouth nightly. (Patient taking differently: Take 40 mg by mouth daily.) 90 Tab 4    fenofibrate nanocrystallized (TRICOR) 145 mg tablet 1 qd 90 Tab 4    naproxen sodium (ALEVE) 220 mg tablet Take 220 mg by mouth two (2) times daily as needed.  loratadine (CLARITIN) 10 mg tablet Take 10 mg by mouth daily as needed.  citalopram (CELEXA) 20 mg tablet Take 1 Tab by mouth daily. 90 Tab 3    esomeprazole (NEXIUM) 40 mg capsule Take 1 Cap by mouth daily. 90 Cap 3    FOLIC ACID/MULTIVIT-MIN/LUTEIN (CENTRUM SILVER PO) Take  by mouth.  cyclobenzaprine (FLEXERIL) 10 mg tablet Take  by mouth three (3) times daily as needed for Muscle Spasm(s).  pioglitazone (ACTOS) 30 mg tablet 1 qd 90 Tab 3    atorvastatin (LIPITOR) 40 mg tablet Take 1 Tab by mouth daily. 90 Tab 3    hydrocodone-acetaminophen (LORTAB)  mg per tablet Take 1 Tab by mouth.          Past Medical History:   Diagnosis Date    Asbestosis ([address]     Bilateral shoulder pain     Chronic airway obstruction, not elsewhere classified     Depressive disorder, not elsewhere classified     Diabetes mellitus ([facility]     Esophagitis, unspecified     Generalized osteoarthrosis, involving multiple sites     GERD (gastroesophageal reflux disease)     Headache(784.0)     Hematuria     neg cysto    Hypercholesterolemia     Hypercholesterolemia     Hypertension     Mixed hyperlipidemia     Neck pain     Pancreatitis 6/2014    Rheumatic fever     w/o sequelae    Type II or unspecified type diabetes mellitus without mention of complication, not stated as uncontrolled     Unspecified hyperplasia of prostate with urinary obstruction and other lower urinary tract symptoms (LUTS)       Past Surgical History:   Procedure Laterality Date    HX APPENDECTOMY      HX COLONOSCOPY  7/26/2013    diverticulosis    HX OPEN REDUCTION INTERNAL FIXATION Right     clavicle fx     Family History   Problem Relation Age of Onset    Cancer Father     Cancer Other     Heart Disease Maternal Grandmother     Heart Disease Paternal Grandfather     Other Sister      brain hemorrhage        Physical Examination: Well-nourished mature male in no apparent distress    Abdomen is nontender   Back-no percussion CVA tenderness     urinalysis: Negative dipstick/nitrite negative      Ultrasound imaging of the kidneys today shows no signs of obstruction on either side        Impression: Kidney stone disease stable status post right ureteroscopic laser lithotripsy        Plan: 24 urine metabolic evaluation calcium, oxalate, uric acid, citric acid            PSA and creatinine today    rtc 6 weeks      More than 1/2 of this 25minute visit was spent in counselling and coordination of care, as described above. Maritza Meza MD  -electronically signed-    PLEASE NOTE:  This document has been produced using voice recognition software. Unrecognized errors in transcription may be present.

## 2018-02-22 NOTE — PATIENT INSTRUCTIONS

## 2018-03-20 RX ORDER — TAMSULOSIN HYDROCHLORIDE 0.4 MG/1
CAPSULE ORAL
Qty: 30 CAP | Refills: 3 | Status: SHIPPED | OUTPATIENT
Start: 2018-03-20 | End: 2018-07-20 | Stop reason: SDUPTHER

## 2018-04-26 ENCOUNTER — OFFICE VISIT (OUTPATIENT)
Dept: UROLOGY | Age: 68
End: 2018-04-26

## 2018-04-26 VITALS
BODY MASS INDEX: 28.06 KG/M2 | WEIGHT: 196 LBS | HEIGHT: 70 IN | SYSTOLIC BLOOD PRESSURE: 136 MMHG | OXYGEN SATURATION: 100 % | HEART RATE: 66 BPM | DIASTOLIC BLOOD PRESSURE: 82 MMHG

## 2018-04-26 DIAGNOSIS — N20.0 KIDNEY STONES: Primary | ICD-10-CM

## 2018-04-26 LAB
BILIRUB UR QL STRIP: NEGATIVE
GLUCOSE UR-MCNC: NEGATIVE MG/DL
KETONES P FAST UR STRIP-MCNC: NEGATIVE MG/DL
PH UR STRIP: 5 [PH] (ref 4.6–8)
PROT UR QL STRIP: NEGATIVE
SP GR UR STRIP: 1.02 (ref 1–1.03)
UA UROBILINOGEN AMB POC: NORMAL (ref 0.2–1)
URINALYSIS CLARITY POC: CLEAR
URINALYSIS COLOR POC: YELLOW
URINE BLOOD POC: NEGATIVE
URINE LEUKOCYTES POC: NORMAL
URINE NITRITES POC: NEGATIVE

## 2018-04-26 NOTE — PATIENT INSTRUCTIONS

## 2018-04-26 NOTE — PROGRESS NOTES
Reggie Ruelas. 78 y.o. male     Mr. Sheri Pineda seen today for kidney stone follow-up  Patient has history of recurrent kidney stones experiencing 3 symptomatic episodes during the past 15 years  Status post right ureteroscopic laser lithotripsy in January 2018 removing a symptomatic 3 mm distal right ureteral stone and a 7 mm right kidney stone under the same anesthesia  Patient is now stone free and asymptomatic here today for results of metabolic evaluation    24 hour urine metabolic evaluation-2 March 2018: Total volume 2.6 L, calcium 192, oxalate 35, citric acid 566, uric acid 918      Renal ultrasound imaging negative for signs of obstruction in February 2018       Presenting initially right-sided flank pain-seen and the emergency room at [facility] on 10 January 2018 complaining of right flank pain finding a 3 mm stone obstructing the distal right ureter associated with right-sided hydronephrosis and perinephric stranding.  There are no stone densities evident in the left kidney nor signs of obstruction of the left urinary tract.  A 7 mm stone in the midpole region of the right kidney is noted incidentally.       History of symptomatic BPH responding favorably to alpha-blocker and 5 alpha reductase inhibitor therapy      Patient is voiding with a forceful stream good control no daytime urgency frequency and no episodes of nocturia since beginning treatment with these medications          PSA 1.1 and August 2013  PSA 1.1 in August 2014  PSA 0.9 in September 2015  PSA 0.8 in October 2016  PSA 0.8 in April 2017  PSA 1.5 in February 2018    Creatinine 1.4  February 2018      Testosterone 245      PVR 59 cc in October 2016   cc in April 2017  PVR 74 cc in October 2017     Creatinine 2.0 in November 2018  Creatinine 1.36 in October 2017  [place] 1.39 in 2016      Review of Systems:    CNS: No headaches seizures syncope or visual changes  Respiratory: No lesion or shortness of breath  Cardiovascular:Hypertension  Intestinal:GERD no change in bowel habits  Urinary: Symptomatic BPH  Skeletal: no bone or joint pain  Endocrine:Large joint arthritisNo diabetes  Other:       Allergies: Allergies   Allergen Reactions    Neurontin [Gabapentin] Other (comments)     confusion    Topamax [Topiramate] Other (comments)     confusion      Medications:    Current Outpatient Prescriptions   Medication Sig Dispense Refill    tamsulosin (FLOMAX) 0.4 mg capsule take 1 capsule by mouth once daily 30 Cap 3    oxyCODONE IR (ROXICODONE) 5 mg immediate release tablet Take 1 Tab by mouth every four (4) hours as needed for Pain. Max Daily Amount: 30 mg. 15 Tab 0    zolpidem (AMBIEN) 10 mg tablet   0    finasteride (PROSCAR) 5 mg tablet take 1 tablet by mouth once daily 90 Tab 3    quinapril (ACCUPRIL) 20 mg tablet Take 1 Tab by mouth nightly. (Patient taking differently: Take 40 mg by mouth daily.) 90 Tab 4    fenofibrate nanocrystallized (TRICOR) 145 mg tablet 1 qd 90 Tab 4    loratadine (CLARITIN) 10 mg tablet Take 10 mg by mouth daily as needed.  citalopram (CELEXA) 20 mg tablet Take 1 Tab by mouth daily. 90 Tab 3    esomeprazole (NEXIUM) 40 mg capsule Take 1 Cap by mouth daily. 90 Cap 3    FOLIC ACID/MULTIVIT-MIN/LUTEIN (CENTRUM SILVER PO) Take  by mouth.  cyclobenzaprine (FLEXERIL) 10 mg tablet Take  by mouth three (3) times daily as needed for Muscle Spasm(s).  pioglitazone (ACTOS) 30 mg tablet 1 qd 90 Tab 3    atorvastatin (LIPITOR) 40 mg tablet Take 1 Tab by mouth daily. 90 Tab 3    hydrocodone-acetaminophen (LORTAB)  mg per tablet Take 1 Tab by mouth.  naproxen sodium (ALEVE) 220 mg tablet Take 220 mg by mouth two (2) times daily as needed.          Past Medical History:   Diagnosis Date    Asbestosis ([address]     Bilateral shoulder pain     Chronic airway obstruction, not elsewhere classified     Depressive disorder, not elsewhere classified     Diabetes mellitus ([facility]     Esophagitis, unspecified     Generalized osteoarthrosis, involving multiple sites     GERD (gastroesophageal reflux disease)     Headache(784.0)     Hematuria     neg cysto    Hypercholesterolemia     Hypercholesterolemia     Hypertension     Mixed hyperlipidemia     Neck pain     Pancreatitis 6/2014    Rheumatic fever     w/o sequelae    Type II or unspecified type diabetes mellitus without mention of complication, not stated as uncontrolled     Unspecified hyperplasia of prostate with urinary obstruction and other lower urinary tract symptoms (LUTS)       Past Surgical History:   Procedure Laterality Date    HX APPENDECTOMY      HX COLONOSCOPY  7/26/2013    diverticulosis    HX OPEN REDUCTION INTERNAL FIXATION Right     clavicle fx     Family History   Problem Relation Age of Onset    Cancer Father     Cancer Other     Heart Disease Maternal Grandmother     Heart Disease Paternal Grandfather     Other Sister      brain hemorrhage        Physical Examination: Well-nourished mature male in no apparent distress    Urinalysis: Negative dipstick/nitrite negative    Impression: History of recurrent kidney stones now stone free status post right ureteroscopic laser lithotripsy -       negative 24 hour urine metabolic evaluation        Plan: Maintain good oral hydration and avoid episodes of dehydration    Described discussed results of metabolic evaluation/reviewed previous CT scan imaging with patient today    Yearly PSA/HERMAN by primary physician      rtc prn      More than 1/2 of this 25 minute visit was spent in counselling and coordination of care, as described above. Therese Judd MD  -electronically signed-    PLEASE NOTE:  This document has been produced using voice recognition software. Unrecognized errors in transcription may be present.

## 2018-04-26 NOTE — MR AVS SNAPSHOT
[address] [facility] [address] [address] 
[phone] Patient: Blair Cadet. MRN: [id number] PVR:3/6/1358 Visit Information Date & Time Provider Department Dept. Phone Encounter #  
 4/26/2018 10:45 AM Mariela Loza [address] [facility] [id number] Follow-up Instructions Return if symptoms worsen or fail to improve. Your Appointments 5/16/2018 10:00 AM  
Follow Up with Fer Charles MD  
[facility] [place] ([address]) Appt Note: 6 month f/u per pt's wife [address], [address]  
  
   
 [address], [address] [place] [address] Upcoming Health Maintenance Date Due Hepatitis C Screening 1950 DTaP/Tdap/Td series (1 - Tdap) 6/3/1972 ZOSTER VACCINE AGE 60> 4/3/2011 EYE EXAM RETINAL OR DILATED Q1 5/4/2017 FOOT EXAM Q1 11/16/2017 HEMOGLOBIN A1C Q6M 4/30/2018 GLAUCOMA SCREENING Q2Y 5/4/2018 MICROALBUMIN Q1 10/31/2018 LIPID PANEL Q1 10/31/2018 COLONOSCOPY 7/1/2023 Allergies as of 4/26/2018  Review Complete On: 4/26/2018 By: Tamika Ann MD  
  
 Severity Noted Reaction Type Reactions Neurontin [Gabapentin]  08/18/2015    Other (comments)  
 confusion Topamax [Topiramate]  08/18/2015    Other (comments)  
 confusion Current Immunizations  Reviewed on 11/17/2015 Name Date Influenza High Dose Vaccine PF 10/31/2017, 10/30/2016 Influenza Vaccine PF 11/17/2015 Pneumococcal Conjugate (PCV-13) 11/16/2016 Pneumococcal Polysaccharide (PPSV-23) 10/31/2017 Not reviewed this visit You Were Diagnosed With   
  
 Codes Comments Kidney stones    -  Primary ICD-10-CM: N20.0 ICD-9-CM: 592.0 Vitals BP Pulse Height(growth percentile) Weight(growth percentile) SpO2 BMI 136/82 (BP 1 Location: Left arm, BP Patient Position: Sitting) 66 5' 10\" (1.778 m) 196 lb (88.9 kg) 100% 28.12 kg/m2 Smoking Status Former Smoker Vitals History BMI and BSA Data Body Mass Index Body Surface Area  
 28.12 kg/m 2 2.1 m 2 Preferred Pharmacy Pharmacy Name Phone [id number], [address] [facility] [phone] Your Updated Medication List  
  
   
This list is accurate as of 4/26/18 11:21 AM.  Always use your most recent med list.  
  
  
  
  
 ALEVE 220 mg tablet Generic drug:  naproxen sodium Take 220 mg by mouth two (2) times daily as needed. atorvastatin 40 mg tablet Commonly known as:  LIPITOR Take 1 Tab by mouth daily. CENTRUM SILVER PO Take  by mouth.  
  
 citalopram 20 mg tablet Commonly known as:  Bevelyn Limber Take 1 Tab by mouth daily. esomeprazole 40 mg capsule Commonly known as:  Clemetine [place] Take 1 Cap by mouth daily. fenofibrate nanocrystallized 145 mg tablet Commonly known as:  TRICOR  
1 qd  
  
 finasteride 5 mg tablet Commonly known as:  PROSCAR  
take 1 tablet by mouth once daily FLEXERIL 10 mg tablet Generic drug:  cyclobenzaprine Take  by mouth three (3) times daily as needed for Muscle Spasm(s). HYDROcodone-acetaminophen  mg tablet Commonly known as:  Rhenda Luo Take 1 Tab by mouth.  
  
 loratadine 10 mg tablet Commonly known as:  Verita Halina Take 10 mg by mouth daily as needed. oxyCODONE IR 5 mg immediate release tablet Commonly known as:  Chula Au Take 1 Tab by mouth every four (4) hours as needed for Pain. Max Daily Amount: 30 mg.  
  
 pioglitazone 30 mg tablet Commonly known as:  ACTOS  
1 qd  
  
 quinapril 20 mg tablet Commonly known as:  ACCUPRIL Take 1 Tab by mouth nightly. tamsulosin 0.4 mg capsule Commonly known as:  FLOMAX  
take 1 capsule by mouth once daily  
  
 zolpidem 10 mg tablet Commonly known as:  JAKE We Performed the Following AMB POC URINALYSIS DIP STICK AUTO W/O MICRO [89313 CPT(R)] SCANNED CARDIAC RHYTHM STRIP [WRT3611 Custom] Follow-up Instructions Return if symptoms worsen or fail to improve. Patient Instructions Kidney Stone: Care Instructions Your Care Instructions Kidney stones are formed when salts, minerals, and other substances normally found in the urine clump together. They can be as small as grains of sand or, rarely, as large as golf balls. While the stone is traveling through the ureter, which is the tube that carries urine from the kidney to the bladder, you will probably feel pain. The pain may be mild or very severe. You may also have some blood in your urine. As soon as the stone reaches the bladder, any intense pain should go away. If a stone is too large to pass on its own, you may need a medical procedure to help you pass the stone. The doctor has checked you carefully, but problems can develop later. If you notice any problems or new symptoms, get medical treatment right away. Follow-up care is a key part of your treatment and safety. Be sure to make and go to all appointments, and call your doctor if you are having problems. It's also a good idea to know your test results and keep a list of the medicines you take. How can you care for yourself at home? · Drink plenty of fluids, enough so that your urine is light yellow or clear like water. If you have kidney, heart, or liver disease and have to limit fluids, talk with your doctor before you increase the amount of fluids you drink. · Take pain medicines exactly as directed. Call your doctor if you think you are having a problem with your medicine. ¨ If the doctor gave you a prescription medicine for pain, take it as prescribed.  
¨ If you are not taking a prescription pain medicine, ask your doctor if you can take an over-the-counter medicine. Read and follow all instructions on the label. · Your doctor may ask you to strain your urine so that you can collect your kidney stone when it passes. You can use a kitchen strainer or a tea strainer to catch the stone. Store it in a plastic bag until you see your doctor again. Preventing future kidney stones Some changes in your diet may help prevent kidney stones. Depending on the cause of your stones, your doctor may recommend that you: · Drink plenty of fluids, enough so that your urine is light yellow or clear like water. If you have kidney, heart, or liver disease and have to limit fluids, talk with your doctor before you increase the amount of fluids you drink. · Limit coffee, tea, and alcohol. Also avoid grapefruit juice. · Do not take more than the recommended daily dose of vitamins C and D. 
· Avoid antacids such as Gaviscon, Maalox, Mylanta, or Tums. · Limit the amount of salt (sodium) in your diet. · Eat a balanced diet that is not too high in protein. · Limit foods that are high in a substance called oxalate, which can cause kidney stones. These foods include dark green vegetables, rhubarb, chocolate, wheat bran, nuts, cranberries, and beans. When should you call for help? Call your doctor now or seek immediate medical care if: 
? · You cannot keep down fluids. ? · Your pain gets worse. ? · You have a fever or chills. ? · You have new or worse pain in your back just below your rib cage (the flank area). ? · You have new or more blood in your urine. ? Watch closely for changes in your health, and be sure to contact your doctor if: 
? · You do not get better as expected. Where can you learn more? Go to http://jhonatan-mayad.info/. Enter B772 in the search box to learn more about \"Kidney Stone: Care Instructions. \" Current as of: May 12, 2017 Content Version: 11.4 © 9104-7671 Healthwise, Incorporated. Care instructions adapted under license by [employer] (which disclaims liability or warranty for this information). If you have questions about a medical condition or this instruction, always ask your healthcare professional. [address] any warranty or liability for your use of this information. Introducing [facility]! Briseida Moulton introduces AllFreed patient portal. Now you can access parts of your medical record, email your doctor's office, and request medication refills online. 1. In your internet browser, go to [email or web address] 2. Click on the First Time User? Click Here link in the Sign In box. You will see the New Member Sign Up page. 3. Enter your AllFreed Access Code exactly as it appears below. You will not need to use this code after youve completed the sign-up process. If you do not sign up before the expiration date, you must request a new code. · AllFreed Access Code: [email or web address] Expires: 7/25/2018 10:48 AM 
 
4. Enter the last four digits of your Social Security Number (xxxx) and Date of Birth (mm/dd/yyyy) as indicated and click Submit. You will be taken to the next sign-up page. 5. Create a AllFreed ID. This will be your AllFreed login ID and cannot be changed, so think of one that is secure and easy to remember. 6. Create a AllFreed password. You can change your password at any time. 7. Enter your Password Reset Question and Answer. This can be used at a later time if you forget your password. 8. Enter your e-mail address. You will receive e-mail notification when new information is available in [address] 9. Click Sign Up. You can now view and download portions of your medical record. 10. Click the Download Summary menu link to download a portable copy of your medical information.  
 
If you have questions, please visit the Frequently Asked Questions section of the [employer]. Remember, [employer] is NOT to be used for urgent needs. For medical emergencies, dial 911. Now available from your iPhone and Android! Please provide this summary of care documentation to your next provider. Your primary care clinician is listed as Yung Montes. If you have any questions after today's visit, please call [phone].

## 2018-04-26 NOTE — PROGRESS NOTES
Mr. Sheri Pineda has a reminder for a \"due or due soon\" health maintenance. I have asked that he contact his primary care provider for follow-up on this health maintenance.

## 2018-05-16 ENCOUNTER — OFFICE VISIT (OUTPATIENT)
Dept: INTERNAL MEDICINE CLINIC | Age: 68
End: 2018-05-16

## 2018-05-16 ENCOUNTER — HOSPITAL ENCOUNTER (OUTPATIENT)
Dept: LAB | Age: 68
Discharge: HOME | End: 2018-05-16
Payer: COMMERCIAL

## 2018-05-16 VITALS
BODY MASS INDEX: 27.77 KG/M2 | WEIGHT: 194 LBS | SYSTOLIC BLOOD PRESSURE: 130 MMHG | HEART RATE: 69 BPM | HEIGHT: 70 IN | DIASTOLIC BLOOD PRESSURE: 80 MMHG | TEMPERATURE: 97.2 F | OXYGEN SATURATION: 98 % | RESPIRATION RATE: 16 BRPM

## 2018-05-16 DIAGNOSIS — E78.2 MIXED HYPERLIPIDEMIA: ICD-10-CM

## 2018-05-16 DIAGNOSIS — E11.9 TYPE 2 DIABETES MELLITUS WITHOUT COMPLICATION, WITHOUT LONG-TERM CURRENT USE OF INSULIN (HCC): Primary | ICD-10-CM

## 2018-05-16 DIAGNOSIS — E11.9 TYPE 2 DIABETES MELLITUS WITHOUT COMPLICATION, WITHOUT LONG-TERM CURRENT USE OF INSULIN (HCC): ICD-10-CM

## 2018-05-16 DIAGNOSIS — I10 ESSENTIAL HYPERTENSION, BENIGN: ICD-10-CM

## 2018-05-16 LAB
ALT SERPL-CCNC: 41 U/L (ref 16–61)
ANION GAP SERPL CALC-SCNC: 9 MMOL/L (ref 3–18)
AST SERPL-CCNC: 31 U/L (ref 15–37)
BUN SERPL-MCNC: 32 MG/DL (ref 7–18)
BUN/CREAT SERPL: 22 (ref 12–20)
CALCIUM SERPL-MCNC: 9.8 MG/DL (ref 8.5–10.1)
CHLORIDE SERPL-SCNC: 103 MMOL/L (ref 100–108)
CHOLEST SERPL-MCNC: 163 MG/DL
CO2 SERPL-SCNC: 26 MMOL/L (ref 21–32)
CREAT SERPL-MCNC: 1.45 MG/DL (ref 0.6–1.3)
EST. AVERAGE GLUCOSE BLD GHB EST-MCNC: 120 MG/DL
GLUCOSE SERPL-MCNC: 109 MG/DL (ref 74–99)
HBA1C MFR BLD: 5.8 % (ref 4.2–5.6)
HDLC SERPL-MCNC: 28 MG/DL (ref 40–60)
HDLC SERPL: 5.8 {RATIO} (ref 0–5)
LDLC SERPL CALC-MCNC: 85.8 MG/DL (ref 0–100)
LIPID PROFILE,FLP: ABNORMAL
POTASSIUM SERPL-SCNC: 4.3 MMOL/L (ref 3.5–5.5)
SODIUM SERPL-SCNC: 138 MMOL/L (ref 136–145)
TRIGL SERPL-MCNC: 246 MG/DL (ref ?–150)
VLDLC SERPL CALC-MCNC: 49.2 MG/DL

## 2018-05-16 PROCEDURE — 36415 COLL VENOUS BLD VENIPUNCTURE: CPT | Performed by: INTERNAL MEDICINE

## 2018-05-16 PROCEDURE — 84450 TRANSFERASE (AST) (SGOT): CPT | Performed by: INTERNAL MEDICINE

## 2018-05-16 PROCEDURE — 83036 HEMOGLOBIN GLYCOSYLATED A1C: CPT | Performed by: INTERNAL MEDICINE

## 2018-05-16 PROCEDURE — 80061 LIPID PANEL: CPT | Performed by: INTERNAL MEDICINE

## 2018-05-16 PROCEDURE — 80048 BASIC METABOLIC PNL TOTAL CA: CPT | Performed by: INTERNAL MEDICINE

## 2018-05-16 PROCEDURE — 84460 ALANINE AMINO (ALT) (SGPT): CPT | Performed by: INTERNAL MEDICINE

## 2018-05-16 NOTE — PROGRESS NOTES
1. Have you been to the ER, urgent care clinic since your last visit? Hospitalized since your last visit? Yes When: jan  Where: [facility] Reason for visit: kidney stone    2. Have you seen or consulted any other health care providers outside of the [address] [place] [address] since your last visit? Include any pap smears or colon screening.  No

## 2018-05-16 NOTE — PATIENT INSTRUCTIONS
Learning About Diabetes Food Guidelines  Your Care Instructions    Meal planning is important to manage diabetes. It helps keep your blood sugar at a target level (which you set with your doctor). You don't have to eat special foods. You can eat what your family eats, including sweets once in a while. But you do have to pay attention to how often you eat and how much you eat of certain foods. You may want to work with a dietitian or a certified diabetes educator (CDE) to help you plan meals and snacks. A dietitian or CDE can also help you lose weight if that is one of your goals. What should you know about eating carbs? Managing the amount of carbohydrate (carbs) you eat is an important part of healthy meals when you have diabetes. Carbohydrate is found in many foods. · Learn which foods have carbs. And learn the amounts of carbs in different foods. ¨ Bread, cereal, pasta, and rice have about 15 grams of carbs in a serving. A serving is 1 slice of bread (1 ounce), ½ cup of cooked cereal, or 1/3 cup of cooked pasta or rice. ¨ Fruits have 15 grams of carbs in a serving. A serving is 1 small fresh fruit, such as an apple or orange; ½ of a banana; ½ cup of cooked or canned fruit; ½ cup of fruit juice; 1 cup of melon or raspberries; or 2 tablespoons of dried fruit. ¨ Milk and no-sugar-added yogurt have 15 grams of carbs in a serving. A serving is 1 cup of milk or 2/3 cup of no-sugar-added yogurt. ¨ Starchy vegetables have 15 grams of carbs in a serving. A serving is ½ cup of mashed potatoes or sweet potato; 1 cup winter squash; ½ of a small baked potato; ½ cup of cooked beans; or ½ cup cooked corn or green peas. · Learn how much carbs to eat each day and at each meal. A dietitian or CDE can teach you how to keep track of the amount of carbs you eat. This is called carbohydrate counting. · If you are not sure how to count carbohydrate grams, use the Plate Method to plan meals.  It is a good, quick way to make sure that you have a balanced meal. It also helps you spread carbs throughout the day. ¨ Divide your plate by types of foods. Put non-starchy vegetables on half the plate, meat or other protein food on one-quarter of the plate, and a grain or starchy vegetable in the final quarter of the plate. To this you can add a small piece of fruit and 1 cup of milk or yogurt, depending on how many carbs you are supposed to eat at a meal.  · Try to eat about the same amount of carbs at each meal. Do not \"save up\" your daily allowance of carbs to eat at one meal.  · Proteins have very little or no carbs per serving. Examples of proteins are beef, chicken, turkey, fish, eggs, tofu, cheese, cottage cheese, and peanut butter. A serving size of meat is 3 ounces, which is about the size of a deck of cards. Examples of meat substitute serving sizes (equal to 1 ounce of meat) are 1/4 cup of cottage cheese, 1 egg, 1 tablespoon of peanut butter, and ½ cup of tofu. How can you eat out and still eat healthy? · Learn to estimate the serving sizes of foods that have carbohydrate. If you measure food at home, it will be easier to estimate the amount in a serving of restaurant food. · If the meal you order has too much carbohydrate (such as potatoes, corn, or baked beans), ask to have a low-carbohydrate food instead. Ask for a salad or green vegetables. · If you use insulin, check your blood sugar before and after eating out to help you plan how much to eat in the future. · If you eat more carbohydrate at a meal than you had planned, take a walk or do other exercise. This will help lower your blood sugar. What else should you know? · Limit saturated fat, such as the fat from meat and dairy products. This is a healthy choice because people who have diabetes are at higher risk of heart disease. So choose lean cuts of meat and nonfat or low-fat dairy products.  Use olive or canola oil instead of butter or shortening when cooking. · Don't skip meals. Your blood sugar may drop too low if you skip meals and take insulin or certain medicines for diabetes. · Check with your doctor before you drink alcohol. Alcohol can cause your blood sugar to drop too low. Alcohol can also cause a bad reaction if you take certain diabetes medicines. Follow-up care is a key part of your treatment and safety. Be sure to make and go to all appointments, and call your doctor if you are having problems. It's also a good idea to know your test results and keep a list of the medicines you take. Where can you learn more? Go to http://jhonatan-mayda.info/. Enter K338 in the search box to learn more about \"Learning About Diabetes Food Guidelines. \"  Current as of: March 13, 2017  Content Version: 11.4  © 3288-5951 [employer]. Care instructions adapted under license by [employer] (which disclaims liability or warranty for this information). If you have questions about a medical condition or this instruction, always ask your healthcare professional. [address] any warranty or liability for your use of this information. Body Mass Index: Care Instructions  Your Care Instructions    Body mass index (BMI) can help you see if your weight is raising your risk for health problems. It uses a formula to compare how much you weigh with how tall you are. · A BMI lower than 18.5 is considered underweight. · A BMI between 18.5 and 24.9 is considered healthy. · A BMI between 25 and 29.9 is considered overweight. A BMI of 30 or higher is considered obese. If your BMI is in the normal range, it means that you have a lower risk for weight-related health problems. If your BMI is in the overweight or obese range, you may be at increased risk for weight-related health problems, such as high blood pressure, heart disease, stroke, arthritis or joint pain, and diabetes.  If your BMI is in the underweight range, you may be at increased risk for health problems such as fatigue, lower protection (immunity) against illness, muscle loss, bone loss, hair loss, and hormone problems. BMI is just one measure of your risk for weight-related health problems. You may be at higher risk for health problems if you are not active, you eat an unhealthy diet, or you drink too much alcohol or use tobacco products. Follow-up care is a key part of your treatment and safety. Be sure to make and go to all appointments, and call your doctor if you are having problems. It's also a good idea to know your test results and keep a list of the medicines you take. How can you care for yourself at home? · Practice healthy eating habits. This includes eating plenty of fruits, vegetables, whole grains, lean protein, and low-fat dairy. · If your doctor recommends it, get more exercise. Walking is a good choice. Bit by bit, increase the amount you walk every day. Try for at least 30 minutes on most days of the week. · Do not smoke. Smoking can increase your risk for health problems. If you need help quitting, talk to your doctor about stop-smoking programs and medicines. These can increase your chances of quitting for good. · Limit alcohol to 2 drinks a day for men and 1 drink a day for women. Too much alcohol can cause health problems. If you have a BMI higher than 25  · Your doctor may do other tests to check your risk for weight-related health problems. This may include measuring the distance around your waist. A waist measurement of more than 40 inches in men or 35 inches in women can increase the risk of weight-related health problems. · Talk with your doctor about steps you can take to stay healthy or improve your health. You may need to make lifestyle changes to lose weight and stay healthy, such as changing your diet and getting regular exercise.   If you have a BMI lower than 18.5  · Your doctor may do other tests to check your risk for health problems. · Talk with your doctor about steps you can take to stay healthy or improve your health. You may need to make lifestyle changes to gain or maintain weight and stay healthy, such as getting more healthy foods in your diet and doing exercises to build muscle. Where can you learn more? Go to http://jhonatan-mayda.info/. Enter S176 in the search box to learn more about \"Body Mass Index: Care Instructions. \"  Current as of: October 13, 2016  Content Version: 11.4  © 1665-9216 [employer]. Care instructions adapted under license by [employer] (which disclaims liability or warranty for this information). If you have questions about a medical condition or this instruction, always ask your healthcare professional. [address] any warranty or liability for your use of this information.

## 2018-05-16 NOTE — PROGRESS NOTES
HPI:   F/u visit for routine evaluation of HTN, T2DM, hyperlipidemia  A1C/chol [birth year] Oct 2017  w/o chest pain/abd. discomfort; no dyspnea, cough or pedal edema; denies constitutional complaints of fever, night sweats or wt loss; no evidence of GI/ hemorrhage. Activity is age appropriate despite chronic pain    ROS is otherwise negative. Past Medical History:   Diagnosis Date    [facility]bestosis ([facility] [address]     Bilateral shoulder pain     Chronic airway obstruction, not elsewhere classified     Depressive disorder, not elsewhere classified     Diabetes mellitus ([facility]     Esophagitis, unspecified     Generalized osteoarthrosis, involving multiple sites     GERD (gastroesophageal reflux disease)     Headache(784.0)     Hematuria     neg cysto    Hypercholesterolemia     Hypercholesterolemia     Hypertension     Mixed hyperlipidemia     Neck pain     Pancreatitis 6/2014    Rheumatic fever     w/o sequelae    Type II or unspecified type diabetes mellitus without mention of complication, not stated as uncontrolled     Unspecified hyperplasia of prostate with urinary obstruction and other lower urinary tract symptoms (LUTS)        Past Surgical History:   Procedure Laterality Date    HX APPENDECTOMY      HX COLONOSCOPY  7/26/2013    diverticulosis    HX OPEN REDUCTION INTERNAL FIXATION Right     clavicle fx       Social History     Social History    Marital status:      Spouse name: N/A    Number of children: N/A    Years of education: N/A     Occupational History    Not on file.      Social History Main Topics    Smoking status: Former Smoker     Packs/day: 1.00     Years: 40.00     Quit date: 4/10/2011    Smokeless tobacco: Former User    Alcohol use No      Comment: a few beers    Drug use: No    Sexual activity: Not on file     Other Topics Concern    Not on file     Social History Narrative       Allergies   Allergen Reactions    Neurontin [Gabapentin] Other (comments)     confusion  Topamax [Topiramate] Other (comments)     confusion       Family History   Problem Relation Age of Onset    Cancer Father     Cancer Other     Heart Disease Maternal Grandmother     Heart Disease Paternal Grandfather     Other Sister      brain hemorrhage       Current Outpatient Prescriptions   Medication Sig Dispense Refill    tamsulosin (FLOMAX) 0.4 mg capsule take 1 capsule by mouth once daily 30 Cap 3    zolpidem (AMBIEN) 10 mg tablet   0    finasteride (PROSCAR) 5 mg tablet take 1 tablet by mouth once daily 90 Tab 3    quinapril (ACCUPRIL) 20 mg tablet Take 1 Tab by mouth nightly. (Patient taking differently: Take 40 mg by mouth daily.) 90 Tab 4    fenofibrate nanocrystallized (TRICOR) 145 mg tablet 1 qd 90 Tab 4    naproxen sodium (ALEVE) 220 mg tablet Take 220 mg by mouth two (2) times daily as needed.  loratadine (CLARITIN) 10 mg tablet Take 10 mg by mouth daily as needed.  citalopram (CELEXA) 20 mg tablet Take 1 Tab by mouth daily. 90 Tab 3    esomeprazole (NEXIUM) 40 mg capsule Take 1 Cap by mouth daily. 90 Cap 3    FOLIC ACID/MULTIVIT-MIN/LUTEIN (CENTRUM SILVER PO) Take  by mouth.  cyclobenzaprine (FLEXERIL) 10 mg tablet Take  by mouth three (3) times daily as needed for Muscle Spasm(s).  pioglitazone (ACTOS) 30 mg tablet 1 qd 90 Tab 3    atorvastatin (LIPITOR) 40 mg tablet Take 1 Tab by mouth daily. 90 Tab 3    hydrocodone-acetaminophen (LORTAB)  mg per tablet Take 1 Tab by mouth. Visit Vitals    /80 (BP 1 Location: Left arm, BP Patient Position: Sitting)    Pulse 69    Temp 97.2 °F (36.2 °C) (Tympanic)    Resp 16    Ht 5' 10\" (1.778 m)    Wt 194 lb (88 kg)    SpO2 98%    BMI 27.84 kg/m2       PE  Well nourished in NAD  HEENT:  OP: clear. Neck: supple w/o mass or bruits. Chest: clear. CV: RRR w/o m,r,g; pulses intact. Abd: soft, NT, w/o HSM or mass. Ext: w/o edema. Neuro: NF.     Assessment and Plan    Encounter Diagnoses   Name Primary?  Type 2 diabetes mellitus without complication, without long-term current use of insulin (HCC) Yes    Essential hypertension, benign     Mixed hyperlipidemia      BP @ goal  Labs to assess metabolic parameters ([id number])  Up to date with eye exams  Continue dietary/exercise efforts  No change in rx  OV 4 mos or prn  I have explained plan to patient and the patient verbalizes understanding            Discussed the patient's BMI with him. The BMI follow up plan is as follows:     dietary management education, guidance, and counseling  encourage exercise  monitor weight  prescribed dietary intake    An After Visit Summary was printed and given to the patient.

## 2018-05-16 NOTE — MR AVS SNAPSHOT
[facility] 
 
 
 [address], [address] [facility] [address] 
[phone] Patient: Marlen Root. MRN: [id number] Visit Information Date & Time Provider Department Dept. Phone Encounter #  
 5/16/2018 10:00 AM Bogdan Guerrier MD [facility] [place] [phone] [id number] Follow-up Instructions Return if symptoms worsen or fail to improve. Follow-up and Disposition History Upcoming Health Maintenance Date Due Hepatitis C Screening 1950 DTaP/Tdap/Td series (1 - Tdap) 6/3/1972 ZOSTER VACCINE AGE 60> 4/3/2011 EYE EXAM RETINAL OR DILATED Q1 5/4/2017 FOOT EXAM Q1 11/16/2017 HEMOGLOBIN A1C Q6M 4/30/2018 GLAUCOMA SCREENING Q2Y 5/4/2018 Influenza Age 5 to Adult 8/1/2018 MICROALBUMIN Q1 10/31/2018 LIPID PANEL Q1 10/31/2018 COLONOSCOPY 7/1/2023 Allergies as of 5/16/2018  Review Complete On: 5/16/2018 By: Bogdan Guerrier MD  
  
 Severity Noted Reaction Type Reactions Neurontin [Gabapentin]  08/18/2015    Other (comments)  
 confusion Topamax [Topiramate]  08/18/2015    Other (comments)  
 confusion Current Immunizations  Reviewed on 11/17/2015 Name Date Influenza High Dose Vaccine PF 10/31/2017, 10/30/2016 Influenza Vaccine PF 11/17/2015 Pneumococcal Conjugate (PCV-13) 11/16/2016 Pneumococcal Polysaccharide (PPSV-23) 10/31/2017 Not reviewed this visit You Were Diagnosed With   
  
 Codes Comments Type 2 diabetes mellitus without complication, without long-term current use of insulin (HCC)    -  Primary ICD-10-CM: E11.9 ICD-9-CM: 250.00 Essential hypertension, benign     ICD-10-CM: I10 
ICD-9-CM: 401.1 Mixed hyperlipidemia     ICD-10-CM: E78.2 ICD-9-CM: 272.2 Vitals BP Pulse Temp Resp Height(growth percentile)  130/80 (BP 1 Location: Left arm, BP Patient Position: Sitting) 69 97.2 °F (36.2 °C) (Tympanic) 16 5' 10\" (1.778 m) Weight(growth percentile) SpO2 BMI Smoking Status 194 lb (88 kg) 98% 27.84 kg/m2 Former Smoker BMI and BSA Data Body Mass Index Body Surface Area  
 27.84 kg/m 2 2.08 m 2 Preferred Pharmacy Pharmacy Name Phone [id number], [address] [place] [phone] Your Updated Medication List  
  
   
This list is accurate as of 5/16/18 10:12 AM.  Always use your most recent med list.  
  
  
  
  
 ALEVE 220 mg tablet Generic drug:  naproxen sodium Take 220 mg by mouth two (2) times daily as needed. atorvastatin 40 mg tablet Commonly known as:  LIPITOR Take 1 Tab by mouth daily. CENTRUM SILVER PO Take  by mouth.  
  
 citalopram 20 mg tablet Commonly known as:  Mavis Like Take 1 Tab by mouth daily. esomeprazole 40 mg capsule Commonly known as:  Juve Alma Take 1 Cap by mouth daily. fenofibrate nanocrystallized 145 mg tablet Commonly known as:  TRICOR  
1 qd  
  
 finasteride 5 mg tablet Commonly known as:  PROSCAR  
take 1 tablet by mouth once daily FLEXERIL 10 mg tablet Generic drug:  cyclobenzaprine Take  by mouth three (3) times daily as needed for Muscle Spasm(s). HYDROcodone-acetaminophen  mg tablet Commonly known as:  Sonia Ríos Take 1 Tab by mouth.  
  
 loratadine 10 mg tablet Commonly known as:  Dennise Punches Take 10 mg by mouth daily as needed. pioglitazone 30 mg tablet Commonly known as:  ACTOS  
1 qd  
  
 quinapril 20 mg tablet Commonly known as:  ACCUPRIL Take 1 Tab by mouth nightly. tamsulosin 0.4 mg capsule Commonly known as:  FLOMAX  
take 1 capsule by mouth once daily  
  
 zolpidem 10 mg tablet Commonly known as:  AMBIEN Follow-up Instructions Return if symptoms worsen or fail to improve. Patient Instructions Learning About Diabetes Food Guidelines Your Care Instructions Meal planning is important to manage diabetes. It helps keep your blood sugar at a target level (which you set with your doctor). You don't have to eat special foods. You can eat what your family eats, including sweets once in a while. But you do have to pay attention to how often you eat and how much you eat of certain foods. You may want to work with a dietitian or a certified diabetes educator (CDE) to help you plan meals and snacks. A dietitian or CDE can also help you lose weight if that is one of your goals. What should you know about eating carbs? Managing the amount of carbohydrate (carbs) you eat is an important part of healthy meals when you have diabetes. Carbohydrate is found in many foods. · Learn which foods have carbs. And learn the amounts of carbs in different foods. ¨ Bread, cereal, pasta, and rice have about 15 grams of carbs in a serving. A serving is 1 slice of bread (1 ounce), ½ cup of cooked cereal, or 1/3 cup of cooked pasta or rice. ¨ Fruits have 15 grams of carbs in a serving. A serving is 1 small fresh fruit, such as an apple or orange; ½ of a banana; ½ cup of cooked or canned fruit; ½ cup of fruit juice; 1 cup of melon or raspberries; or 2 tablespoons of dried fruit. ¨ Milk and no-sugar-added yogurt have 15 grams of carbs in a serving. A serving is 1 cup of milk or 2/3 cup of no-sugar-added yogurt. ¨ Starchy vegetables have 15 grams of carbs in a serving. A serving is ½ cup of mashed potatoes or sweet potato; 1 cup winter squash; ½ of a small baked potato; ½ cup of cooked beans; or ½ cup cooked corn or green peas. · Learn how much carbs to eat each day and at each meal. A dietitian or CDE can teach you how to keep track of the amount of carbs you eat. This is called carbohydrate counting. · If you are not sure how to count carbohydrate grams, use the Plate Method to plan meals.  It is a good, quick way to make sure that you have a balanced meal. It also helps you spread carbs throughout the day. ¨ Divide your plate by types of foods. Put non-starchy vegetables on half the plate, meat or other protein food on one-quarter of the plate, and a grain or starchy vegetable in the final quarter of the plate. To this you can add a small piece of fruit and 1 cup of milk or yogurt, depending on how many carbs you are supposed to eat at a meal. 
· Try to eat about the same amount of carbs at each meal. Do not \"save up\" your daily allowance of carbs to eat at one meal. 
· Proteins have very little or no carbs per serving. Examples of proteins are beef, chicken, turkey, fish, eggs, tofu, cheese, cottage cheese, and peanut butter. A serving size of meat is 3 ounces, which is about the size of a deck of cards. Examples of meat substitute serving sizes (equal to 1 ounce of meat) are 1/4 cup of cottage cheese, 1 egg, 1 tablespoon of peanut butter, and ½ cup of tofu. How can you eat out and still eat healthy? · Learn to estimate the serving sizes of foods that have carbohydrate. If you measure food at home, it will be easier to estimate the amount in a serving of restaurant food. · If the meal you order has too much carbohydrate (such as potatoes, corn, or baked beans), ask to have a low-carbohydrate food instead. Ask for a salad or green vegetables. · If you use insulin, check your blood sugar before and after eating out to help you plan how much to eat in the future. · If you eat more carbohydrate at a meal than you had planned, take a walk or do other exercise. This will help lower your blood sugar. What else should you know? · Limit saturated fat, such as the fat from meat and dairy products. This is a healthy choice because people who have diabetes are at higher risk of heart disease. So choose lean cuts of meat and nonfat or low-fat dairy products. Use olive or canola oil instead of butter or shortening when cooking. · Don't skip meals. Your blood sugar may drop too low if you skip meals and take insulin or certain medicines for diabetes. · Check with your doctor before you drink alcohol. Alcohol can cause your blood sugar to drop too low. Alcohol can also cause a bad reaction if you take certain diabetes medicines. Follow-up care is a key part of your treatment and safety. Be sure to make and go to all appointments, and call your doctor if you are having problems. It's also a good idea to know your test results and keep a list of the medicines you take. Where can you learn more? Go to http://jhonatan-mayda.info/. Enter K282 in the search box to learn more about \"Learning About Diabetes Food Guidelines. \" Current as of: March 13, 2017 Content Version: 11.4 © 8921-1377 [employer]. Care instructions adapted under license by [employer] (which disclaims liability or warranty for this information). If you have questions about a medical condition or this instruction, always ask your healthcare professional. [address] any warranty or liability for your use of this information. Body Mass Index: Care Instructions Your Care Instructions Body mass index (BMI) can help you see if your weight is raising your risk for health problems. It uses a formula to compare how much you weigh with how tall you are. · A BMI lower than 18.5 is considered underweight. · A BMI between 18.5 and 24.9 is considered healthy. · A BMI between 25 and 29.9 is considered overweight. A BMI of 30 or higher is considered obese. If your BMI is in the normal range, it means that you have a lower risk for weight-related health problems.  If your BMI is in the overweight or obese range, you may be at increased risk for weight-related health problems, such as high blood pressure, heart disease, stroke, arthritis or joint pain, and diabetes. If your BMI is in the underweight range, you may be at increased risk for health problems such as fatigue, lower protection (immunity) against illness, muscle loss, bone loss, hair loss, and hormone problems. BMI is just one measure of your risk for weight-related health problems. You may be at higher risk for health problems if you are not active, you eat an unhealthy diet, or you drink too much alcohol or use tobacco products. Follow-up care is a key part of your treatment and safety. Be sure to make and go to all appointments, and call your doctor if you are having problems. It's also a good idea to know your test results and keep a list of the medicines you take. How can you care for yourself at home? · Practice healthy eating habits. This includes eating plenty of fruits, vegetables, whole grains, lean protein, and low-fat dairy. · If your doctor recommends it, get more exercise. Walking is a good choice. Bit by bit, increase the amount you walk every day. Try for at least 30 minutes on most days of the week. · Do not smoke. Smoking can increase your risk for health problems. If you need help quitting, talk to your doctor about stop-smoking programs and medicines. These can increase your chances of quitting for good. · Limit alcohol to 2 drinks a day for men and 1 drink a day for women. Too much alcohol can cause health problems. If you have a BMI higher than 25 · Your doctor may do other tests to check your risk for weight-related health problems. This may include measuring the distance around your waist. A waist measurement of more than 40 inches in men or 35 inches in women can increase the risk of weight-related health problems. · Talk with your doctor about steps you can take to stay healthy or improve your health. You may need to make lifestyle changes to lose weight and stay healthy, such as changing your diet and getting regular exercise. If you have a BMI lower than 18.5 · Your doctor may do other tests to check your risk for health problems. · Talk with your doctor about steps you can take to stay healthy or improve your health. You may need to make lifestyle changes to gain or maintain weight and stay healthy, such as getting more healthy foods in your diet and doing exercises to build muscle. Where can you learn more? Go to http://jhonatan-mayda.info/. Enter S176 in the search box to learn more about \"Body Mass Index: Care Instructions. \" Current as of: October 13, 2016 Content Version: 11.4 © 4446-2538 [employer]. Care instructions adapted under license by [employer] (which disclaims liability or warranty for this information). If you have questions about a medical condition or this instruction, always ask your healthcare professional. [address] any warranty or liability for your use of this information. Patient Instructions History Introducing [facility]! [employer] introduces Zafu patient portal. Now you can access parts of your medical record, email your doctor's office, and request medication refills online. 1. In your internet browser, go to [email or web address] 2. Click on the First Time User? Click Here link in the Sign In box. You will see the New Member Sign Up page. 3. Enter your Zafu Access Code exactly as it appears below. You will not need to use this code after youve completed the sign-up process. If you do not sign up before the expiration date, you must request a new code. · Zafu Access Code: [email or web address] Expires: 7/25/2018 10:48 AM 
 
4. Enter the last four digits of your Social Security Number (xxxx) and Date of Birth (mm/dd/yyyy) as indicated and click Submit. You will be taken to the next sign-up page. 5. Create a Zafu ID.  This will be your Zafu login ID and cannot be changed, so think of one that is secure and easy to remember. 6. Create a NexSteppe password. You can change your password at any time. 7. Enter your Password Reset Question and Answer. This can be used at a later time if you forget your password. 8. Enter your e-mail address. You will receive e-mail notification when new information is available in [address] 9. Click Sign Up. You can now view and download portions of your medical record. 10. Click the Download Summary menu link to download a portable copy of your medical information. If you have questions, please visit the Frequently Asked Questions section of the NexSteppe website. Remember, NexSteppe is NOT to be used for urgent needs. For medical emergencies, dial 911. Now available from your iPhone and Android! Please provide this summary of care documentation to your next provider. Your primary care clinician is listed as Yumiko Segal. If you have any questions after today's visit, please call [phone].

## 2018-07-17 RX ORDER — QUINAPRIL 20 MG/1
TABLET ORAL
Qty: 90 TAB | Refills: 3 | Status: SHIPPED | OUTPATIENT
Start: 2018-07-17

## 2018-07-17 RX ORDER — CITALOPRAM 20 MG/1
TABLET, FILM COATED ORAL
Qty: 90 TAB | Refills: 3 | Status: SHIPPED | OUTPATIENT
Start: 2018-07-17 | End: 2019-07-16 | Stop reason: SDUPTHER

## 2018-07-23 RX ORDER — TAMSULOSIN HYDROCHLORIDE 0.4 MG/1
CAPSULE ORAL
Qty: 30 CAP | Refills: 3 | Status: SHIPPED | OUTPATIENT
Start: 2018-07-23 | End: 2018-11-19 | Stop reason: SDUPTHER

## 2018-08-01 ENCOUNTER — OFFICE VISIT (OUTPATIENT)
Dept: INTERNAL MEDICINE CLINIC | Age: 68
End: 2018-08-01

## 2018-08-01 VITALS
WEIGHT: 194 LBS | TEMPERATURE: 97.5 F | HEART RATE: 81 BPM | RESPIRATION RATE: 16 BRPM | HEIGHT: 70 IN | SYSTOLIC BLOOD PRESSURE: 120 MMHG | DIASTOLIC BLOOD PRESSURE: 74 MMHG | OXYGEN SATURATION: 97 % | BODY MASS INDEX: 27.77 KG/M2

## 2018-08-01 DIAGNOSIS — H66.90 ACUTE OTITIS MEDIA, UNSPECIFIED OTITIS MEDIA TYPE: Primary | ICD-10-CM

## 2018-08-01 DIAGNOSIS — E88.81 METABOLIC SYNDROME: ICD-10-CM

## 2018-08-01 RX ORDER — AMOXICILLIN AND CLAVULANATE POTASSIUM 875; 125 MG/1; MG/1
TABLET, FILM COATED ORAL
Qty: 20 TAB | Refills: 0 | Status: SHIPPED | OUTPATIENT
Start: 2018-08-01 | End: 2018-12-11

## 2018-08-01 NOTE — PATIENT INSTRUCTIONS
Diet and Exercise for Metabolic Syndrome: Care Instructions  Your Care Instructions    Metabolic syndrome is the name for a group of health problems. It includes having too much fat around your waist and high blood pressure. It also includes high triglycerides, high blood sugar, and low levels of healthy (HDL) cholesterol. These problems make it more likely you will have a heart attack or stroke or get diabetes. Your family history (your genes) can cause metabolic syndrome. So can unhealthy eating habits and not getting enough exercise. You can help lower your risk of heart attack, stroke, and diabetes if you eat healthy foods and get more exercise. It may be hard to make these lifestyle changes. But even small changes can help. Follow-up care is a key part of your treatment and safety. Be sure to make and go to all appointments, and call your doctor if you are having problems. It's also a good idea to know your test results and keep a list of the medicines you take. How can you care for yourself at home? Eat more fruits and vegetables  · Fruits and vegetables have nutrients to help protect you from heart disease and high blood pressure. They are low in fat and high in fiber. Dark green, orange, and yellow ones are the healthiest.  · Keep lots of vegetables ready for snacks. · Buy fruit that is in season. Then put it where you can see it so you will want to eat it. · Cook dishes that have a lot of vegetables. Soups and stir-fries are good choices. Limit saturated and trans fats  · Read food labels, and try to avoid saturated and trans fats. They increase your risk of heart disease. · Use olive or canola oil when you cook. · Bake, broil, grill, or steam foods. Avoid fried foods. · Limit how much high-fat meat you eat. This includes hot dogs and sausages. When you prepare meat, cut off all the fat. · You can replace high-fat meat with fish and skinless poultry.  You can also try products made from soybeans, like tofu. Soybeans may be very good for your heart. · Eat at least 2 servings of fish a week. Fish with omega-3 fatty acids may help reduce your risk of getting coronary artery disease. [place] and sardines are good examples. · Choose low-fat or fat-free milk and dairy products. Eat foods high in fiber  · Foods high in fiber may reduce your cholesterol. And they may give you important vitamins and minerals. Good examples are oatmeal, cooked dried beans, brown rice, citrus fruits, and apples. · Eat whole-grain breads and cereals. They have more fiber than white bread or pastries. Limit high-sugar foods  · Limit foods and drinks that are high in sugar. Some examples are soda pop, sugar-sweetened fruit drinks, candy, and many desserts. · Limit the amount of sugar, honey, and other sweeteners that you add to food and drinks. · Choose water instead of soda pop or other sugar-sweetened drinks. · Limit fruit juice. Limit salt and sodium  · You can help lower your blood pressure if you limit salt and sodium. · If you take the salt shaker off the table, it may be easier to use less salt. You can also try using half the salt in a recipe. And you can avoid adding salt to cooking water for pasta, rice, and potatoes. · Try to eat fewer snacks, fast foods, and other high-salt, processed foods. Check labels so you know how much sodium a food has. · Choose canned goods (soups, vegetables, and beans) that are low in sodium. Get regular exercise  · Get more exercise. Make sure your doctor knows when you start a new exercise program. Even small amounts of exercise will help you get stronger and have more energy. It can also help you manage your weight and your stress. · Walking is a good choice. Little by little, increase the amount you walk every day. Try for at least 30 minutes on most days of the week. Where can you learn more? Go to http://julien.info/.   Enter 124 [id number] in the search box to learn more about \"Diet and Exercise for Metabolic Syndrome: Care Instructions. \"  Current as of: May 12, 2017  Content Version: 11.7  © 2090-5367 [email or web address]. Care instructions adapted under license by [employer] (which disclaims liability or warranty for this information). If you have questions about a medical condition or this instruction, always ask your healthcare professional. [address] any warranty or liability for your use of this information. Body Mass Index: Care Instructions  Your Care Instructions    Body mass index (BMI) can help you see if your weight is raising your risk for health problems. It uses a formula to compare how much you weigh with how tall you are. · A BMI lower than 18.5 is considered underweight. · A BMI between 18.5 and 24.9 is considered healthy. · A BMI between 25 and 29.9 is considered overweight. A BMI of 30 or higher is considered obese. If your BMI is in the normal range, it means that you have a lower risk for weight-related health problems. If your BMI is in the overweight or obese range, you may be at increased risk for weight-related health problems, such as high blood pressure, heart disease, stroke, arthritis or joint pain, and diabetes. If your BMI is in the underweight range, you may be at increased risk for health problems such as fatigue, lower protection (immunity) against illness, muscle loss, bone loss, hair loss, and hormone problems. BMI is just one measure of your risk for weight-related health problems. You may be at higher risk for health problems if you are not active, you eat an unhealthy diet, or you drink too much alcohol or use tobacco products. Follow-up care is a key part of your treatment and safety. Be sure to make and go to all appointments, and call your doctor if you are having problems. It's also a good idea to know your test results and keep a list of the medicines you take.   How can you care for yourself at home? · Practice healthy eating habits. This includes eating plenty of fruits, vegetables, whole grains, lean protein, and low-fat dairy. · If your doctor recommends it, get more exercise. Walking is a good choice. Bit by bit, increase the amount you walk every day. Try for at least 30 minutes on most days of the week. · Do not smoke. Smoking can increase your risk for health problems. If you need help quitting, talk to your doctor about stop-smoking programs and medicines. These can increase your chances of quitting for good. · Limit alcohol to 2 drinks a day for men and 1 drink a day for women. Too much alcohol can cause health problems. If you have a BMI higher than 25  · Your doctor may do other tests to check your risk for weight-related health problems. This may include measuring the distance around your waist. A waist measurement of more than 40 inches in men or 35 inches in women can increase the risk of weight-related health problems. · Talk with your doctor about steps you can take to stay healthy or improve your health. You may need to make lifestyle changes to lose weight and stay healthy, such as changing your diet and getting regular exercise. If you have a BMI lower than 18.5  · Your doctor may do other tests to check your risk for health problems. · Talk with your doctor about steps you can take to stay healthy or improve your health. You may need to make lifestyle changes to gain or maintain weight and stay healthy, such as getting more healthy foods in your diet and doing exercises to build muscle. Where can you learn more? Go to http://jhonatan-mayda.info/. Enter S176 in the search box to learn more about \"Body Mass Index: Care Instructions. \"  Current as of: October 13, 2016  Content Version: 11.4  © 4787-8977 Healthwise, [employer].  Care instructions adapted under license by [employer] (which disclaims liability or warranty for this information). If you have questions about a medical condition or this instruction, always ask your healthcare professional. [place] [address] any warranty or liability for your use of this information.

## 2018-08-01 NOTE — PROGRESS NOTES
HPI:   Pt with metabolic syndrome presents with 7 days of head congestion, facial discomfort, NP cough, (L) ear pain w/o fever, dyspnea, CP, or N      ROS is otherwise negative. Past Medical History:   Diagnosis Date    Asbestosis ([facility] [address]     Bilateral shoulder pain     Chronic airway obstruction, not elsewhere classified     Depressive disorder, not elsewhere classified     Diabetes mellitus ([facility] [address]     Esophagitis, unspecified     Generalized osteoarthrosis, involving multiple sites     GERD (gastroesophageal reflux disease)     Headache(784.0)     Hematuria     neg cysto    Hypercholesterolemia     Hypercholesterolemia     Hypertension     Mixed hyperlipidemia     Neck pain     Pancreatitis 6/2014    Rheumatic fever     w/o sequelae    Type II or unspecified type diabetes mellitus without mention of complication, not stated as uncontrolled     Unspecified hyperplasia of prostate with urinary obstruction and other lower urinary tract symptoms (LUTS)        Past Surgical History:   Procedure Laterality Date    HX APPENDECTOMY      HX COLONOSCOPY  7/26/2013    diverticulosis    HX OPEN REDUCTION INTERNAL FIXATION Right     clavicle fx       Social History     Social History    Marital status:      Spouse name: N/A    Number of children: N/A    Years of education: N/A     Occupational History    Not on file.      Social History Main Topics    Smoking status: Former Smoker     Packs/day: 1.00     Years: 40.00     Quit date: 4/10/2011    Smokeless tobacco: Former User    Alcohol use No      Comment: a few beers    Drug use: No    Sexual activity: Not on file     Other Topics Concern    Not on file     Social History Narrative       Allergies   Allergen Reactions    Neurontin [Gabapentin] Other (comments)     confusion    Topamax [Topiramate] Other (comments)     confusion       Family History   Problem Relation Age of Onset    Cancer Father     Cancer Other     Heart Disease Maternal Grandmother     Heart Disease Paternal Grandfather     Other Sister      brain hemorrhage       Current Outpatient Prescriptions   Medication Sig Dispense Refill    tamsulosin (FLOMAX) 0.4 mg capsule take 1 capsule by mouth once daily 30 Cap 3    quinapril (ACCUPRIL) 20 mg tablet TAKE 1 TABLET NIGHTLY 90 Tab 3    citalopram (CELEXA) 20 mg tablet TAKE 1 TABLET DAILY 90 Tab 3    zolpidem (AMBIEN) 10 mg tablet   0    finasteride (PROSCAR) 5 mg tablet take 1 tablet by mouth once daily 90 Tab 3    fenofibrate nanocrystallized (TRICOR) 145 mg tablet 1 qd 90 Tab 4    naproxen sodium (ALEVE) 220 mg tablet Take 220 mg by mouth two (2) times daily as needed.  loratadine (CLARITIN) 10 mg tablet Take 10 mg by mouth daily as needed.  esomeprazole (NEXIUM) 40 mg capsule Take 1 Cap by mouth daily. 90 Cap 3    FOLIC ACID/MULTIVIT-MIN/LUTEIN (CENTRUM SILVER PO) Take  by mouth.  cyclobenzaprine (FLEXERIL) 10 mg tablet Take  by mouth three (3) times daily as needed for Muscle Spasm(s).  pioglitazone (ACTOS) 30 mg tablet 1 qd 90 Tab 3    atorvastatin (LIPITOR) 40 mg tablet Take 1 Tab by mouth daily. 90 Tab 3    hydrocodone-acetaminophen (LORTAB)  mg per tablet Take 1 Tab by mouth. Visit Vitals    /74 (BP 1 Location: Right arm, BP Patient Position: Sitting)    Pulse 81    Temp 97.5 °F (36.4 °C) (Tympanic)    Resp 16    Ht 5' 10\" (1.778 m)    Wt 194 lb (88 kg)    SpO2 97%    BMI 27.84 kg/m2       PE  Well nourished in NAD  HEENT: OP: clear. TMS: (L) red; (R) wnl  Neck: supple w/o mass or bruits. Chest: clear. CV: RRR w/o m,r,g; pulses intact. Abd: soft, NT, w/o HSM or mass. Ext: w/o edema. Neuro: NF. Assessment and Plan    Encounter Diagnoses   Name Primary?     Acute otitis media, unspecified otitis media type Yes    Metabolic syndrome        Otitis media - (L) - augmentin 875 bid with food for 10 days; continue claritin  F/U worsening or unimproved 7 days  Metabolic syndrome - continue dietary/exercise efforts  OV Sept 2018  Discussed the patient's BMI with him. The BMI follow up plan is as follows:     dietary management education, guidance, and counseling  encourage exercise  monitor weight  prescribed dietary intake    An After Visit Summary was printed and given to the patient.

## 2018-08-12 RX ORDER — FENOFIBRATE 145 MG/1
TABLET, COATED ORAL
Qty: 90 TAB | Refills: 3 | Status: SHIPPED | OUTPATIENT
Start: 2018-08-12 | End: 2020-08-05

## 2018-10-02 RX ORDER — FINASTERIDE 5 MG/1
TABLET, FILM COATED ORAL
Qty: 90 TAB | Refills: 3 | Status: SHIPPED | OUTPATIENT
Start: 2018-10-02 | End: 2019-09-17 | Stop reason: SDUPTHER

## 2018-10-11 ENCOUNTER — CLINICAL SUPPORT (OUTPATIENT)
Dept: INTERNAL MEDICINE CLINIC | Age: 68
End: 2018-10-11

## 2018-10-11 DIAGNOSIS — Z23 ENCOUNTER FOR IMMUNIZATION: Primary | ICD-10-CM

## 2018-10-11 RX ORDER — PIOGLITAZONEHYDROCHLORIDE 30 MG/1
TABLET ORAL
Qty: 90 TAB | Refills: 3 | Status: SHIPPED | OUTPATIENT
Start: 2018-10-11 | End: 2019-06-13

## 2018-11-04 RX ORDER — ATORVASTATIN CALCIUM 40 MG/1
TABLET, FILM COATED ORAL
Qty: 90 TAB | Refills: 1 | Status: SHIPPED | OUTPATIENT
Start: 2018-11-04 | End: 2019-05-15 | Stop reason: SDUPTHER

## 2018-11-19 RX ORDER — TAMSULOSIN HYDROCHLORIDE 0.4 MG/1
CAPSULE ORAL
Qty: 30 CAP | Refills: 3 | Status: SHIPPED | OUTPATIENT
Start: 2018-11-19 | End: 2019-03-18 | Stop reason: SDUPTHER

## 2018-11-21 DIAGNOSIS — E11.9 TYPE 2 DIABETES MELLITUS WITHOUT COMPLICATION, WITHOUT LONG-TERM CURRENT USE OF INSULIN (HCC): Primary | ICD-10-CM

## 2018-12-11 ENCOUNTER — OFFICE VISIT (OUTPATIENT)
Dept: INTERNAL MEDICINE CLINIC | Age: 68
End: 2018-12-11

## 2018-12-11 ENCOUNTER — HOSPITAL ENCOUNTER (OUTPATIENT)
Dept: LAB | Age: 68
Discharge: HOME | End: 2018-12-11
Payer: COMMERCIAL

## 2018-12-11 VITALS
HEIGHT: 70 IN | HEART RATE: 80 BPM | RESPIRATION RATE: 16 BRPM | OXYGEN SATURATION: 98 % | BODY MASS INDEX: 28.27 KG/M2 | DIASTOLIC BLOOD PRESSURE: 86 MMHG | TEMPERATURE: 97.5 F | WEIGHT: 197.5 LBS | SYSTOLIC BLOOD PRESSURE: 138 MMHG

## 2018-12-11 DIAGNOSIS — E11.9 TYPE 2 DIABETES MELLITUS WITHOUT COMPLICATION, WITHOUT LONG-TERM CURRENT USE OF INSULIN (HCC): ICD-10-CM

## 2018-12-11 DIAGNOSIS — Z00.00 ROUTINE CHECK-UP: Primary | ICD-10-CM

## 2018-12-11 DIAGNOSIS — I10 ESSENTIAL HYPERTENSION, BENIGN: ICD-10-CM

## 2018-12-11 DIAGNOSIS — E78.2 MIXED HYPERLIPIDEMIA: ICD-10-CM

## 2018-12-11 DIAGNOSIS — R91.1 LUNG NODULE: ICD-10-CM

## 2018-12-11 LAB
ALBUMIN SERPL-MCNC: 4.2 G/DL (ref 3.4–5)
ALBUMIN/GLOB SERPL: 1.4 {RATIO} (ref 0.8–1.7)
ALP SERPL-CCNC: 58 U/L (ref 45–117)
ALT SERPL-CCNC: 46 U/L (ref 16–61)
ANION GAP SERPL CALC-SCNC: 6 MMOL/L (ref 3–18)
AST SERPL-CCNC: 30 U/L (ref 15–37)
BASOPHILS # BLD: 0 K/UL (ref 0–0.1)
BASOPHILS NFR BLD: 0 % (ref 0–2)
BILIRUB SERPL-MCNC: 0.5 MG/DL (ref 0.2–1)
BUN SERPL-MCNC: 27 MG/DL (ref 7–18)
BUN/CREAT SERPL: 22 (ref 12–20)
CALCIUM SERPL-MCNC: 10.3 MG/DL (ref 8.5–10.1)
CHLORIDE SERPL-SCNC: 108 MMOL/L (ref 100–108)
CHOLEST SERPL-MCNC: 178 MG/DL
CO2 SERPL-SCNC: 27 MMOL/L (ref 21–32)
CREAT SERPL-MCNC: 1.24 MG/DL (ref 0.6–1.3)
DIFFERENTIAL METHOD BLD: ABNORMAL
EOSINOPHIL # BLD: 0.2 K/UL (ref 0–0.4)
EOSINOPHIL NFR BLD: 3 % (ref 0–5)
ERYTHROCYTE [DISTWIDTH] IN BLOOD BY AUTOMATED COUNT: 13.2 % (ref 11.6–14.5)
EST. AVERAGE GLUCOSE BLD GHB EST-MCNC: 120 MG/DL
GLOBULIN SER CALC-MCNC: 3 G/DL (ref 2–4)
GLUCOSE SERPL-MCNC: 117 MG/DL (ref 74–99)
HBA1C MFR BLD: 5.8 % (ref 4.2–5.6)
HCT VFR BLD AUTO: 43.6 % (ref 36–48)
HDLC SERPL-MCNC: 29 MG/DL (ref 40–60)
HDLC SERPL: 6.1 {RATIO} (ref 0–5)
HGB BLD-MCNC: 14.4 G/DL (ref 13–16)
LDLC SERPL CALC-MCNC: 82.8 MG/DL (ref 0–100)
LIPID PROFILE,FLP: ABNORMAL
LYMPHOCYTES # BLD: 2.1 K/UL (ref 0.9–3.6)
LYMPHOCYTES NFR BLD: 34 % (ref 21–52)
MCH RBC QN AUTO: 30.7 PG (ref 24–34)
MCHC RBC AUTO-ENTMCNC: 33 G/DL (ref 31–37)
MCV RBC AUTO: 93 FL (ref 74–97)
MONOCYTES # BLD: 0.7 K/UL (ref 0.05–1.2)
MONOCYTES NFR BLD: 11 % (ref 3–10)
NEUTS SEG # BLD: 3.2 K/UL (ref 1.8–8)
NEUTS SEG NFR BLD: 52 % (ref 40–73)
PLATELET # BLD AUTO: 248 K/UL (ref 135–420)
PMV BLD AUTO: 11.1 FL (ref 9.2–11.8)
POTASSIUM SERPL-SCNC: 4.7 MMOL/L (ref 3.5–5.5)
PROT SERPL-MCNC: 7.2 G/DL (ref 6.4–8.2)
RBC # BLD AUTO: 4.69 M/UL (ref 4.7–5.5)
SODIUM SERPL-SCNC: 141 MMOL/L (ref 136–145)
TRIGL SERPL-MCNC: 331 MG/DL (ref ?–150)
VLDLC SERPL CALC-MCNC: 66.2 MG/DL
WBC # BLD AUTO: 6.2 K/UL (ref 4.6–13.2)

## 2018-12-11 PROCEDURE — 36415 COLL VENOUS BLD VENIPUNCTURE: CPT

## 2018-12-11 PROCEDURE — 80053 COMPREHEN METABOLIC PANEL: CPT

## 2018-12-11 PROCEDURE — 85025 COMPLETE CBC W/AUTO DIFF WBC: CPT

## 2018-12-11 PROCEDURE — 82570 ASSAY OF URINE CREATININE: CPT

## 2018-12-11 PROCEDURE — 83036 HEMOGLOBIN GLYCOSYLATED A1C: CPT

## 2018-12-11 PROCEDURE — 80061 LIPID PANEL: CPT

## 2018-12-11 NOTE — PROGRESS NOTES
HPI:   Check up  F/u visit for routine evaluation of HTN, T2DM, hyperlipidemia  A1C/chol 5.8/163 May 2018  No acute issues  w/o chest pain/abd. discomfort; no dyspnea, cough or pedal edema; denies constitutional complaints of fever, night sweats or wt loss; no evidence of GI/ hemorrhage    ROS is otherwise negative.     Past Medical History:   Diagnosis Date    Asbestosis ([facility] [address]     Bilateral shoulder pain     Chronic airway obstruction, not elsewhere classified     Depressive disorder, not elsewhere classified     Diabetes mellitus ([facility] [address]     Esophagitis, unspecified     Generalized osteoarthrosis, involving multiple sites     GERD (gastroesophageal reflux disease)     Headache(784.0)     Hematuria     neg cysto    Hypercholesterolemia     Hypercholesterolemia     Hypertension     Mixed hyperlipidemia     Neck pain     Pancreatitis 6/2014    Rheumatic fever     w/o sequelae    Type II or unspecified type diabetes mellitus without mention of complication, not stated as uncontrolled     Unspecified hyperplasia of prostate with urinary obstruction and other lower urinary tract symptoms (LUTS)        Past Surgical History:   Procedure Laterality Date    HX APPENDECTOMY      HX COLONOSCOPY  7/26/2013    diverticulosis    HX OPEN REDUCTION INTERNAL FIXATION Right     clavicle fx       Social History     Socioeconomic History    Marital status:      Spouse name: Not on file    Number of children: Not on file    Years of education: Not on file    Highest education level: Not on file   Social Needs    Financial resource strain: Not on file    Food insecurity - worry: Not on file    Food insecurity - inability: Not on file   [employer] needs - medical: Not on file   [employer] needs - non-medical: Not on file   Occupational History    Not on file   Tobacco Use    Smoking status: Former Smoker     Packs/day: 1.00     Years: 40.00     Pack years: 40.00     Last attempt to quit: 4/10/2011     Years since quittin.6    Smokeless tobacco: Former User   Substance and Sexual Activity    Alcohol use: No     Alcohol/week: 0.0 oz     Comment: a few beers    Drug use: No    Sexual activity: Not on file   Other Topics Concern    Not on file   Social History Narrative    Not on file       Allergies   Allergen Reactions    Neurontin [Gabapentin] Other (comments)     confusion    Topamax [Topiramate] Other (comments)     confusion       Family History   Problem Relation Age of Onset    Cancer Father     Cancer Other     Heart Disease Maternal Grandmother     Heart Disease Paternal Grandfather     Other Sister         brain hemorrhage       Current Outpatient Medications   Medication Sig Dispense Refill    tamsulosin (FLOMAX) 0.4 mg capsule take 1 capsule by mouth once daily 30 Cap 3    atorvastatin (LIPITOR) 40 mg tablet TAKE 1 TABLET DAILY 90 Tab 1    pioglitazone (ACTOS) 30 mg tablet take 1 tablet by mouth once daily 90 Tab 3    finasteride (PROSCAR) 5 mg tablet take 1 tablet by mouth once daily 90 Tab 3    fenofibrate nanocrystallized (TRICOR) 145 mg tablet TAKE 1 TABLET DAILY 90 Tab 3    quinapril (ACCUPRIL) 20 mg tablet TAKE 1 TABLET NIGHTLY 90 Tab 3    citalopram (CELEXA) 20 mg tablet TAKE 1 TABLET DAILY 90 Tab 3    zolpidem (AMBIEN) 10 mg tablet   0    naproxen sodium (ALEVE) 220 mg tablet Take 220 mg by mouth two (2) times daily as needed.  loratadine (CLARITIN) 10 mg tablet Take 10 mg by mouth daily as needed.  esomeprazole (NEXIUM) 40 mg capsule Take 1 Cap by mouth daily. 90 Cap 3    FOLIC ACID/MULTIVIT-MIN/LUTEIN (CENTRUM SILVER PO) Take  by mouth.  cyclobenzaprine (FLEXERIL) 10 mg tablet Take  by mouth three (3) times daily as needed for Muscle Spasm(s).  hydrocodone-acetaminophen (LORTAB)  mg per tablet Take 1 Tab by mouth.              Visit Vitals  /86 (BP 1 Location: Left arm, BP Patient Position: Sitting)   Pulse 80   Temp 97.5 °F (36.4 °C) (Tympanic)   Resp 16   Ht 5' 10\" (1.778 m)   Wt 197 lb 8 oz (89.6 kg)   SpO2 98%   BMI 28.34 kg/m²       PE  Well nourished in NAD  HEENT:  OP: clear. Neck: supple w/o mass or bruits. Chest: clear. CV: RRR w/o m,r,g; pulses intact. Abd: soft, NT, w/o HSM or mass. Ext: w/o edema. Neuro: NF. Assessment and Plan    Encounter Diagnoses   Name Primary?  Type 2 diabetes mellitus without complication, without long-term current use of insulin (HCC) Yes    Mixed hyperlipidemia     Essential hypertension, benign     Lung nodule        BP @ goal  Labs to assess metabolic parameters ([id number])  F/U chest CT arranged (lung nodule)  Up to date with eye exams  Continue dietary/exercise efforts; flu vaccine already recieved  Disputanta done 7/2013 (diverticula)  No change in rx  OV 4 mos or prn  I have explained plan to patient and the patient verbalizes understanding      Discussed the patient's BMI with him. The BMI follow up plan is as follows:     dietary management education, guidance, and counseling  encourage exercise  monitor weight  prescribed dietary intake    An After Visit Summary was printed and given to the patient.

## 2018-12-11 NOTE — PATIENT INSTRUCTIONS
Nutrition Tips for Diabetes: After Your Visit  Your Care Instructions  A healthy diet is important to manage diabetes. It helps you lose weight (if you need to) and keep it off. It gives you the nutrition and energy your body needs and helps prevent heart disease. But a diet for diabetes does not mean that you have to eat special foods. You can eat what your family eats, including occasional sweets and other favorites. But you do have to pay attention to how often you eat and how much you eat of certain foods. The right plan for you will give you meals that help you keep your blood sugar at healthy levels. Try to eat a variety of foods and to spread carbohydrate throughout the day. Carbohydrate raises blood sugar higher and more quickly than any other nutrient does. Carbohydrate is found in sugar, breads and cereals, fruit, starchy vegetables such as potatoes and corn, and milk and yogurt. You may want to work with a dietitian or diabetes educator to help you plan meals and snacks. A dietitian or diabetes educator also can help you lose weight if that is one of your goals. The following tips can help you enjoy your meals and stay healthy. Follow-up care is a key part of your treatment and safety. Be sure to make and go to all appointments, and call your doctor if you are having problems. Its also a good idea to know your test results and keep a list of the medicines you take. How can you care for yourself at home? · Learn which foods have carbohydrate and how much carbohydrate to eat. A dietitian or diabetes educator can help you learn to keep track of how much carbohydrate you eat. · Spread carbohydrate throughout the day. Eat some carbohydrate at all meals, but do not eat too much at any one time. · Plan meals to include food from all the food groups.  These are the food groups and some example portion sizes:  ¨ Grains: 1 slice of bread (1 ounce), ½ cup of cooked cereal, and 1/3 cup of cooked pasta or rice. These have about 15 grams of carbohydrate in a serving. Choose whole grains such as whole wheat bread or crackers, oatmeal, and brown rice more often than refined grains. ¨ Fruit: 1 small fresh fruit, such as an apple or orange; ½ of a banana; ½ cup of chopped, cooked, or canned fruit; ½ cup of fruit juice; 1 cup of melon or raspberries; and 2 tablespoons of dried fruit. These have about 15 grams of carbohydrate in a serving. ¨ Dairy: 1 cup of nonfat or low-fat milk and 2/3 cup of plain yogurt. These have about 15 grams of carbohydrate in a serving. ¨ Protein foods: Beef, chicken, turkey, fish, eggs, tofu, cheese, cottage cheese, and peanut butter. A serving size of meat is 3 ounces, which is about the size of a deck of cards. Examples of meat substitute serving sizes (equal to 1 ounce of meat) are 1/4 cup of cottage cheese, 1 egg, 1 tablespoon of peanut butter, and ½ cup of tofu. These have very little or no carbohydrate per serving. ¨ Vegetables: Starchy vegetables such as ½ cup of cooked dried beans, peas, potatoes, or corn have about 15 grams of carbohydrate. Nonstarchy vegetables have very little carbohydrate, such as 1 cup of raw leafy vegetables (such as spinach), ½ cup of other vegetables (cooked or chopped), and 3/4 cup of vegetable juice. · Use the plate format to plan meals. It is a good, quick way to make sure that you have a balanced meal. It also helps you spread carbohydrate throughout the day. You divide your plate by types of foods. Put vegetables on half the plate, meat or meat substitutes on one-quarter of the plate, and a grain or starchy vegetable (such as brown rice or a potato) in the final quarter of the plate. To this you can add a small piece of fruit and 1 cup of milk or yogurt, depending on how much carbohydrate you are supposed to eat at a meal.  · Talk to your dietitian or diabetes educator about ways to add limited amounts of sweets into your meal plan.  You can eat these foods now and then, as long as you include the amount of carbohydrate they have in your daily carbohydrate allowance. · If you drink alcohol, limit it to no more than 1 drink a day for women and 2 drinks a day for men. If you are pregnant, no amount of alcohol is known to be safe. · Protein, fat, and fiber do not raise blood sugar as much as carbohydrate does. If you eat a lot of these nutrients in a meal, your blood sugar will rise more slowly than it would otherwise. · Limit saturated fats, such as those from meat and dairy products. Try to replace it with monounsaturated fat, such as olive oil. This is a healthier choice because people who have diabetes are at higher-than-average risk of heart disease. But use a modest amount of olive oil. A tablespoon of olive oil has 14 grams of fat and 120 calories. · Exercise lowers blood sugar. If you take insulin by shots or pump, you can use less than you would if you were not exercising. Keep in mind that timing matters. If you exercise within 1 hour after a meal, your body may need less insulin for that meal than it would if you exercised 3 hours after the meal. Test your blood sugar to find out how exercise affects your need for insulin. · Exercise on most days of the week. Aim for at least 30 minutes. Exercise helps you stay at a healthy weight and helps your body use insulin. Walking is an easy way to get exercise. Gradually increase the amount you walk every day. You also may want to swim, bike, or do other activities. When you eat out  · Learn to estimate the serving sizes of foods that have carbohydrate. If you measure food at home, it will be easier to estimate the amount in a serving of restaurant food. · If the meal you order has too much carbohydrate (such as potatoes, corn, or baked beans), ask to have a low-carbohydrate food instead. Ask for a salad or green vegetables.   · If you use insulin, check your blood sugar before and after eating out to help you plan how much to eat in the future. · If you eat more carbohydrate at a meal than you had planned, take a walk or do other exercise. This will help lower your blood sugar. Where can you learn more? Go to [employer].be  Enter O717 in the search box to learn more about \"Nutrition Tips for Diabetes: After Your Visit. \"   © 5535-8221 [employer], [employer]. Care instructions adapted under license by [employer] (which disclaims liability or warranty for this information). This care instruction is for use with your licensed healthcare professional. If you have questions about a medical condition or this instruction, always ask your healthcare professional. [address] any warranty or liability for your use of this information. Content Version: 31.0.921442; Current as of: June 4, 2014                   Body Mass Index: Care Instructions  Your Care Instructions    Body mass index (BMI) can help you see if your weight is raising your risk for health problems. It uses a formula to compare how much you weigh with how tall you are. · A BMI lower than 18.5 is considered underweight. · A BMI between 18.5 and 24.9 is considered healthy. · A BMI between 25 and 29.9 is considered overweight. A BMI of 30 or higher is considered obese. If your BMI is in the normal range, it means that you have a lower risk for weight-related health problems. If your BMI is in the overweight or obese range, you may be at increased risk for weight-related health problems, such as high blood pressure, heart disease, stroke, arthritis or joint pain, and diabetes. If your BMI is in the underweight range, you may be at increased risk for health problems such as fatigue, lower protection (immunity) against illness, muscle loss, bone loss, hair loss, and hormone problems. BMI is just one measure of your risk for weight-related health problems.  You may be at higher risk for health problems if you are not active, you eat an unhealthy diet, or you drink too much alcohol or use tobacco products. Follow-up care is a key part of your treatment and safety. Be sure to make and go to all appointments, and call your doctor if you are having problems. It's also a good idea to know your test results and keep a list of the medicines you take. How can you care for yourself at home? · Practice healthy eating habits. This includes eating plenty of fruits, vegetables, whole grains, lean protein, and low-fat dairy. · If your doctor recommends it, get more exercise. Walking is a good choice. Bit by bit, increase the amount you walk every day. Try for at least 30 minutes on most days of the week. · Do not smoke. Smoking can increase your risk for health problems. If you need help quitting, talk to your doctor about stop-smoking programs and medicines. These can increase your chances of quitting for good. · Limit alcohol to 2 drinks a day for men and 1 drink a day for women. Too much alcohol can cause health problems. If you have a BMI higher than 25  · Your doctor may do other tests to check your risk for weight-related health problems. This may include measuring the distance around your waist. A waist measurement of more than 40 inches in men or 35 inches in women can increase the risk of weight-related health problems. · Talk with your doctor about steps you can take to stay healthy or improve your health. You may need to make lifestyle changes to lose weight and stay healthy, such as changing your diet and getting regular exercise. If you have a BMI lower than 18.5  · Your doctor may do other tests to check your risk for health problems. · Talk with your doctor about steps you can take to stay healthy or improve your health. You may need to make lifestyle changes to gain or maintain weight and stay healthy, such as getting more healthy foods in your diet and doing exercises to build muscle.   Where can you learn more?  Go to http://jhonatan-mayda.info/. Enter S176 in the search box to learn more about \"Body Mass Index: Care Instructions. \"  Current as of: October 13, 2016  Content Version: 11.4  © 4547-0891 [employer]. Care instructions adapted under license by [employer] (which disclaims liability or warranty for this information). If you have questions about a medical condition or this instruction, always ask your healthcare professional. [address] any warranty or liability for your use of this information.

## 2018-12-11 NOTE — PROGRESS NOTES
1. Have you been to the ER, urgent care clinic since your last visit? Hospitalized since your last visit? No    2. Have you seen or consulted any other health care providers outside of the [address] [place] [address] since your last visit? Include any pap smears or colon screening.  No

## 2018-12-12 LAB
CREAT UR-MCNC: 158 MG/DL (ref 30–125)
MICROALBUMIN UR-MCNC: 1.39 MG/DL (ref 0–3)
MICROALBUMIN/CREAT UR-RTO: 9 MG/G (ref 0–30)

## 2019-01-04 ENCOUNTER — HOSPITAL ENCOUNTER (OUTPATIENT)
Dept: CT IMAGING | Age: 69
Discharge: HOME | End: 2019-01-04
Attending: INTERNAL MEDICINE
Payer: COMMERCIAL

## 2019-01-04 DIAGNOSIS — R91.1 LUNG NODULE: ICD-10-CM

## 2019-01-04 PROCEDURE — 71250 CT THORAX DX C-: CPT

## 2019-03-18 RX ORDER — TAMSULOSIN HYDROCHLORIDE 0.4 MG/1
CAPSULE ORAL
Qty: 30 CAP | Refills: 3 | Status: SHIPPED | OUTPATIENT
Start: 2019-03-18 | End: 2019-07-16 | Stop reason: SDUPTHER

## 2019-04-24 RX ORDER — ESOMEPRAZOLE MAGNESIUM 40 MG/1
CAPSULE, DELAYED RELEASE ORAL
Qty: 90 CAP | Refills: 3 | Status: SHIPPED | OUTPATIENT
Start: 2019-04-24 | End: 2020-04-15

## 2019-05-15 RX ORDER — ATORVASTATIN CALCIUM 40 MG/1
TABLET, FILM COATED ORAL
Qty: 90 TAB | Refills: 1 | Status: SHIPPED | OUTPATIENT
Start: 2019-05-15 | End: 2019-11-19 | Stop reason: SDUPTHER

## 2019-06-05 DIAGNOSIS — E11.9 TYPE 2 DIABETES MELLITUS WITHOUT COMPLICATION, WITHOUT LONG-TERM CURRENT USE OF INSULIN (HCC): Primary | ICD-10-CM

## 2019-06-12 ENCOUNTER — HOSPITAL ENCOUNTER (OUTPATIENT)
Dept: LAB | Age: 69
Discharge: HOME | End: 2019-06-12
Payer: COMMERCIAL

## 2019-06-12 ENCOUNTER — OFFICE VISIT (OUTPATIENT)
Dept: FAMILY MEDICINE CLINIC | Age: 69
End: 2019-06-12

## 2019-06-12 VITALS
DIASTOLIC BLOOD PRESSURE: 70 MMHG | HEIGHT: 70 IN | RESPIRATION RATE: 16 BRPM | SYSTOLIC BLOOD PRESSURE: 110 MMHG | WEIGHT: 193 LBS | HEART RATE: 69 BPM | TEMPERATURE: 97.4 F | BODY MASS INDEX: 27.63 KG/M2 | OXYGEN SATURATION: 97 %

## 2019-06-12 DIAGNOSIS — E11.21 TYPE 2 DIABETES MELLITUS WITH NEPHROPATHY (HCC): Primary | ICD-10-CM

## 2019-06-12 DIAGNOSIS — E78.2 MIXED HYPERLIPIDEMIA: ICD-10-CM

## 2019-06-12 DIAGNOSIS — I10 ESSENTIAL HYPERTENSION, BENIGN: ICD-10-CM

## 2019-06-12 DIAGNOSIS — E11.9 TYPE 2 DIABETES MELLITUS WITHOUT COMPLICATION, WITHOUT LONG-TERM CURRENT USE OF INSULIN (HCC): ICD-10-CM

## 2019-06-12 LAB
ALBUMIN SERPL-MCNC: 4.1 G/DL (ref 3.4–5)
ALBUMIN/GLOB SERPL: 1.5 {RATIO} (ref 0.8–1.7)
ALP SERPL-CCNC: 43 U/L (ref 45–117)
ALT SERPL-CCNC: 44 U/L (ref 16–61)
ANION GAP SERPL CALC-SCNC: 4 MMOL/L (ref 3–18)
AST SERPL-CCNC: 32 U/L (ref 15–37)
BILIRUB SERPL-MCNC: 0.5 MG/DL (ref 0.2–1)
BUN SERPL-MCNC: 27 MG/DL (ref 7–18)
BUN/CREAT SERPL: 19 (ref 12–20)
CALCIUM SERPL-MCNC: 9.3 MG/DL (ref 8.5–10.1)
CHLORIDE SERPL-SCNC: 106 MMOL/L (ref 100–108)
CHOLEST SERPL-MCNC: 159 MG/DL
CO2 SERPL-SCNC: 30 MMOL/L (ref 21–32)
CREAT SERPL-MCNC: 1.43 MG/DL (ref 0.6–1.3)
EST. AVERAGE GLUCOSE BLD GHB EST-MCNC: 123 MG/DL
GLOBULIN SER CALC-MCNC: 2.7 G/DL (ref 2–4)
GLUCOSE SERPL-MCNC: 105 MG/DL (ref 74–99)
HBA1C MFR BLD: 5.9 % (ref 4.2–5.6)
HDLC SERPL-MCNC: 30 MG/DL (ref 40–60)
HDLC SERPL: 5.3 {RATIO} (ref 0–5)
LDLC SERPL CALC-MCNC: 83.8 MG/DL (ref 0–100)
LIPID PROFILE,FLP: ABNORMAL
POTASSIUM SERPL-SCNC: 4.4 MMOL/L (ref 3.5–5.5)
PROT SERPL-MCNC: 6.8 G/DL (ref 6.4–8.2)
SODIUM SERPL-SCNC: 140 MMOL/L (ref 136–145)
TRIGL SERPL-MCNC: 226 MG/DL (ref ?–150)
VLDLC SERPL CALC-MCNC: 45.2 MG/DL

## 2019-06-12 PROCEDURE — 80053 COMPREHEN METABOLIC PANEL: CPT

## 2019-06-12 PROCEDURE — 80061 LIPID PANEL: CPT

## 2019-06-12 PROCEDURE — 83036 HEMOGLOBIN GLYCOSYLATED A1C: CPT

## 2019-06-12 PROCEDURE — 36415 COLL VENOUS BLD VENIPUNCTURE: CPT

## 2019-06-12 NOTE — PATIENT INSTRUCTIONS
DASH Diet: Care Instructions  Your Care Instructions    The DASH diet is an eating plan that can help lower your blood pressure. DASH stands for Dietary Approaches to Stop Hypertension. Hypertension is high blood pressure. The DASH diet focuses on eating foods that are high in calcium, potassium, and magnesium. These nutrients can lower blood pressure. The foods that are highest in these nutrients are fruits, vegetables, low-fat dairy products, nuts, seeds, and legumes. But taking calcium, potassium, and magnesium supplements instead of eating foods that are high in those nutrients does not have the same effect. The DASH diet also includes whole grains, fish, and poultry. The DASH diet is one of several lifestyle changes your doctor may recommend to lower your high blood pressure. Your doctor may also want you to decrease the amount of sodium in your diet. Lowering sodium while following the DASH diet can lower blood pressure even further than just the DASH diet alone. Follow-up care is a key part of your treatment and safety. Be sure to make and go to all appointments, and call your doctor if you are having problems. It's also a good idea to know your test results and keep a list of the medicines you take. How can you care for yourself at home? Following the DASH diet  · Eat 4 to 5 servings of fruit each day. A serving is 1 medium-sized piece of fruit, ½ cup chopped or canned fruit, 1/4 cup dried fruit, or 4 ounces (½ cup) of fruit juice. Choose fruit more often than fruit juice. · Eat 4 to 5 servings of vegetables each day. A serving is 1 cup of lettuce or raw leafy vegetables, ½ cup of chopped or cooked vegetables, or 4 ounces (½ cup) of vegetable juice. Choose vegetables more often than vegetable juice. · Get 2 to 3 servings of low-fat and fat-free dairy each day. A serving is 8 ounces of milk, 1 cup of yogurt, or 1 ½ ounces of cheese. · Eat 6 to 8 servings of grains each day.  A serving is 1 slice of bread, 1 ounce of dry cereal, or ½ cup of cooked rice, pasta, or cooked cereal. Try to choose whole-grain products as much as possible. · Limit lean meat, poultry, and fish to 2 servings each day. A serving is 3 ounces, about the size of a deck of cards. · Eat 4 to 5 servings of nuts, seeds, and legumes (cooked dried beans, lentils, and split peas) each week. A serving is 1/3 cup of nuts, 2 tablespoons of seeds, or ½ cup of cooked beans or peas. · Limit fats and oils to 2 to 3 servings each day. A serving is 1 teaspoon of vegetable oil or 2 tablespoons of salad dressing. · Limit sweets and added sugars to 5 servings or less a week. A serving is 1 tablespoon jelly or jam, ½ cup sorbet, or 1 cup of lemonade. · Eat less than 2,300 milligrams (mg) of sodium a day. If you limit your sodium to 1,500 mg a day, you can lower your blood pressure even more. Tips for success  · Start small. Do not try to make dramatic changes to your diet all at once. You might feel that you are missing out on your favorite foods and then be more likely to not follow the plan. Make small changes, and stick with them. Once those changes become habit, add a few more changes. · Try some of the following:  ? Make it a goal to eat a fruit or vegetable at every meal and at snacks. This will make it easy to get the recommended amount of fruits and vegetables each day. ? Try yogurt topped with fruit and nuts for a snack or healthy dessert. ? Add lettuce, tomato, cucumber, and onion to sandwiches. ? Combine a ready-made pizza crust with low-fat mozzarella cheese and lots of vegetable toppings. Try using tomatoes, squash, spinach, broccoli, carrots, cauliflower, and onions. ? Have a variety of cut-up vegetables with a low-fat dip as an appetizer instead of chips and dip. ? Sprinkle sunflower seeds or chopped almonds over salads. Or try adding chopped walnuts or almonds to cooked vegetables.   ? Try some vegetarian meals using beans and peas. Add garbanzo or kidney beans to salads. Make burritos and tacos with mashed sheets beans or black beans. Where can you learn more? Go to http://jhonatan-mayda.info/. Enter Q886 in the search box to learn more about \"DASH Diet: Care Instructions. \"  Current as of: July 22, 2018  Content Version: 11.9  © 9867-2518 [employer]. Care instructions adapted under license by [employer] (which disclaims liability or warranty for this information). If you have questions about a medical condition or this instruction, always ask your healthcare professional. [address] any warranty or liability for your use of this information. Body Mass Index: Care Instructions  Your Care Instructions    Body mass index (BMI) can help you see if your weight is raising your risk for health problems. It uses a formula to compare how much you weigh with how tall you are. · A BMI lower than 18.5 is considered underweight. · A BMI between 18.5 and 24.9 is considered healthy. · A BMI between 25 and 29.9 is considered overweight. A BMI of 30 or higher is considered obese. If your BMI is in the normal range, it means that you have a lower risk for weight-related health problems. If your BMI is in the overweight or obese range, you may be at increased risk for weight-related health problems, such as high blood pressure, heart disease, stroke, arthritis or joint pain, and diabetes. If your BMI is in the underweight range, you may be at increased risk for health problems such as fatigue, lower protection (immunity) against illness, muscle loss, bone loss, hair loss, and hormone problems. BMI is just one measure of your risk for weight-related health problems. You may be at higher risk for health problems if you are not active, you eat an unhealthy diet, or you drink too much alcohol or use tobacco products. Follow-up care is a key part of your treatment and safety. Be sure to make and go to all appointments, and call your doctor if you are having problems. It's also a good idea to know your test results and keep a list of the medicines you take. How can you care for yourself at home? · Practice healthy eating habits. This includes eating plenty of fruits, vegetables, whole grains, lean protein, and low-fat dairy. · If your doctor recommends it, get more exercise. Walking is a good choice. Bit by bit, increase the amount you walk every day. Try for at least 30 minutes on most days of the week. · Do not smoke. Smoking can increase your risk for health problems. If you need help quitting, talk to your doctor about stop-smoking programs and medicines. These can increase your chances of quitting for good. · Limit alcohol to 2 drinks a day for men and 1 drink a day for women. Too much alcohol can cause health problems. If you have a BMI higher than 25  · Your doctor may do other tests to check your risk for weight-related health problems. This may include measuring the distance around your waist. A waist measurement of more than 40 inches in men or 35 inches in women can increase the risk of weight-related health problems. · Talk with your doctor about steps you can take to stay healthy or improve your health. You may need to make lifestyle changes to lose weight and stay healthy, such as changing your diet and getting regular exercise. If you have a BMI lower than 18.5  · Your doctor may do other tests to check your risk for health problems. · Talk with your doctor about steps you can take to stay healthy or improve your health. You may need to make lifestyle changes to gain or maintain weight and stay healthy, such as getting more healthy foods in your diet and doing exercises to build muscle. Where can you learn more? Go to http://jhonatan-mayda.info/. Enter S176 in the search box to learn more about \"Body Mass Index: Care Instructions. \"  Current as of: October 13, 2016  Content Version: 11.4  © 2387-3092 [employer]. Care instructions adapted under license by [employer] (which disclaims liability or warranty for this information). If you have questions about a medical condition or this instruction, always ask your healthcare professional. [address] any warranty or liability for your use of this information.

## 2019-06-12 NOTE — PROGRESS NOTES
Chief Complaint   Patient presents with    Follow Up Chronic Condition    Diabetes       Pt preferred language for health care discussion is english. Is someone accompanying this pt? no    Is the patient using any DME equipment during [address]? no    Depression Screening:  3 most recent PHQ Screens 1/16/2018 10/26/2017   Little interest or pleasure in doing things Not at all Not at all   Feeling down, depressed, irritable, or hopeless Not at all Not at all   Total Score PHQ 2 0 0       Learning Assessment:  Learning Assessment 11/22/2017 8/18/2015 3/3/2015   PRIMARY LEARNER Patient Patient Patient   HIGHEST LEVEL OF EDUCATION - PRIMARY LEARNER  DID NOT GRADUATE HIGH SCHOOL DID NOT GRADUATE HIGH SCHOOL -   [employer]RIERS PRIMARY LEARNER - [address] CAREGIVER - No No   PRIMARY LANGUAGE ENGLISH ENGLISH ENGLISH   LEARNER PREFERENCE PRIMARY DEMONSTRATION DEMONSTRATION LISTENING   ANSWERED BY patient patient patient   RELATIONSHIP SELF SELF SELF   ASSESSMENT COMMENT - no deficiet observed -       Abuse Screening:  Abuse Screening Questionnaire 1/16/2018 10/26/2017 11/16/2016   Do you ever feel afraid of your partner? N N N   Are you in a relationship with someone who physically or mentally threatens you? N N N   Is it safe for you to go home? Y Y Y       Fall Risk  Fall Risk Assessment, last 12 mths 1/16/2018   Able to walk? Yes   Fall in past 12 months? No           Advance Directive:  1. Do you have an advance directive in place? Patient Reply:no    2. If not, would you like material regarding how to put one in place? Patient Reply: n. Coordination of Care:  1. Have you been to the ER, urgent care clinic since your last visit? Hospitalized since your last visit? no    2. Have you seen or consulted any other health care providers outside of the [address] [place] [address] since your last visit? Include any pap smears or colon screening.  Yes Sobeida Pineda [facility]     Provider prefers to do his own med reconciliation

## 2019-06-12 NOTE — PROGRESS NOTES
HPI:   F/u visit for routine evaluation of HTN, T2DM, hyperlipidemia  A1C/chol 5.8/170 Dec 2018  Seeing ENT for hearing loss and recent MRI neg for acoustic neuroma  No acute issues  w/o chest pain/abd. discomfort; no dyspnea, cough or pedal edema; denies constitutional complaints of fever, night sweats or wt loss; no evidence of GI/ hemorrhage. Activity is age appropriate. ROS is otherwise negative.     Past Medical History:   Diagnosis Date    [facility] ([facility] [address]     Bilateral shoulder pain     Chronic airway obstruction, not elsewhere classified     Depressive disorder, not elsewhere classified     Diabetes mellitus ([facility] [address]     Esophagitis, unspecified     Generalized osteoarthrosis, involving multiple sites     GERD (gastroesophageal reflux disease)     Headache(784.0)     Hematuria     neg cysto    Hypercholesterolemia     Hypercholesterolemia     Hypertension     Mixed hyperlipidemia     Neck pain     Pancreatitis 6/2014    Rheumatic fever     w/o sequelae    Type II or unspecified type diabetes mellitus without mention of complication, not stated as uncontrolled     Unspecified hyperplasia of prostate with urinary obstruction and other lower urinary tract symptoms (LUTS)        Past Surgical History:   Procedure Laterality Date    HX APPENDECTOMY      HX COLONOSCOPY  7/26/2013    diverticulosis    HX OPEN REDUCTION INTERNAL FIXATION Right     clavicle fx       Social History     Socioeconomic History    Marital status:      Spouse name: Not on file    Number of children: Not on file    Years of education: Not on file    Highest education level: Not on file   Occupational History    Not on file   Social Needs    Financial resource strain: Not on file    Food insecurity:     Worry: Not on file     Inability: Not on file    Transportation needs:     Medical: Not on file     Non-medical: Not on file   Tobacco Use    Smoking status: Former Smoker     Packs/day: 1.00     Years: 40.00     Pack years: 40.00     Last attempt to quit: 4/10/2011     Years since quittin.1    Smokeless tobacco: Former User   Substance and Sexual Activity    Alcohol use: No     Alcohol/week: 0.0 oz     Comment: a few beers    Drug use: No    Sexual activity: Not on file   Lifestyle    Physical activity:     Days per week: Not on file     Minutes per session: Not on file    Stress: Not on file   Relationships    Social connections:     Talks on phone: Not on file     Gets together: Not on file     Attends [employer]oration[employer] service: Not on file     Active member of club or organization: Not on file     Attends meetings of clubs or organizations: Not on file     Relationship status: Not on file    Intimate partner violence:     Fear of current or ex partner: Not on file     Emotionally abused: Not on file     Physically abused: Not on file     Forced sexual activity: Not on file   Other Topics Concern    Not on file   Social History Narrative    Not on file       Allergies   Allergen Reactions    Neurontin [Gabapentin] Other (comments)     confusion    Topamax [Topiramate] Other (comments)     confusion       Family History   Problem Relation Age of Onset    Cancer Father     Cancer Other     Heart Disease Maternal Grandmother     Heart Disease Paternal Grandfather     Other Sister         brain hemorrhage       Current Outpatient Medications   Medication Sig Dispense Refill    atorvastatin (LIPITOR) 40 mg tablet TAKE 1 TABLET DAILY 90 Tab 1    esomeprazole (NEXIUM) 40 mg capsule TAKE 1 CAPSULE DAILY 90 Cap 3    tamsulosin (FLOMAX) 0.4 mg capsule take 1 capsule by mouth once daily 30 Cap 3    pioglitazone (ACTOS) 30 mg tablet take 1 tablet by mouth once daily 90 Tab 3    finasteride (PROSCAR) 5 mg tablet take 1 tablet by mouth once daily 90 Tab 3    fenofibrate nanocrystallized (TRICOR) 145 mg tablet TAKE 1 TABLET DAILY 90 Tab 3    quinapril (ACCUPRIL) 20 mg tablet TAKE 1 TABLET NIGHTLY 90 Tab 3  citalopram (CELEXA) 20 mg tablet TAKE 1 TABLET DAILY 90 Tab 3    zolpidem (AMBIEN) 10 mg tablet   0    naproxen sodium (ALEVE) 220 mg tablet Take 220 mg by mouth two (2) times daily as needed.  loratadine (CLARITIN) 10 mg tablet Take 10 mg by mouth daily as needed.  FOLIC ACID/MULTIVIT-MIN/LUTEIN (CENTRUM SILVER PO) Take  by mouth.  cyclobenzaprine (FLEXERIL) 10 mg tablet Take  by mouth three (3) times daily as needed for Muscle Spasm(s).  hydrocodone-acetaminophen (LORTAB)  mg per tablet Take 1 Tab by mouth. Visit Vitals  /70 (BP 1 Location: Left arm, BP Patient Position: Sitting)   Pulse 69   Temp 97.4 °F (36.3 °C) (Tympanic)   Resp 16   Ht 5' 10\" (1.778 m)   Wt 193 lb (87.5 kg)   SpO2 97%   BMI 27.69 kg/m²       PE  Well nourished in NAD  HEENT: OP: clear. Neck: supple w/o mass or bruits. Chest: clear. CV: RRR w/o m,r,g; pulses intact. Abd: soft, NT, w/o HSM or mass. Ext: w/o edema. Neuro: NF. Assessment and Plan    Encounter Diagnoses   Name Primary?  Type 2 diabetes mellitus with nephropathy ([facility] [address] Yes    Essential hypertension, benign     Mixed hyperlipidemia    BP @ goal  Labs to assess metabolic parameters (J1LX/[id number])  Up to date with eye exams  Continue dietary/exercise efforts  No change in rx  OV 4 mos or prn  I have explained plan to patient and the patient verbalizes understanding          Discussed the patient's BMI with him. The BMI follow up plan is as follows:     dietary management education, guidance, and counseling  encourage exercise  monitor weight  prescribed dietary intake    An After Visit Summary was printed and given to the patient.

## 2019-06-13 RX ORDER — PIOGLITAZONEHYDROCHLORIDE 15 MG/1
TABLET ORAL
Qty: 90 TAB | Refills: 3 | Status: SHIPPED | OUTPATIENT
Start: 2019-06-13 | End: 2020-06-07

## 2019-06-14 ENCOUNTER — IMPORTED ENCOUNTER (OUTPATIENT)
Dept: URBAN - NONMETROPOLITAN AREA CLINIC 1 | Facility: CLINIC | Age: 69
End: 2019-06-14

## 2019-06-14 PROCEDURE — 99214 OFFICE O/P EST MOD 30 MIN: CPT

## 2019-06-14 NOTE — PATIENT DISCUSSION
DM c min   OD -Stressed the importance of keeping blood sugars under control blood pressure under control and weight normalization and regular visits with PCP. -Explained the possible effects of poorly controlled diabetes and the damage that diabetes can cause to ocular health. -Patient to check HgbA1C.-Pt instructed to contact our office with any vision changes. PCIOL OUMonitor for PCO Dermatochalasis UL OUBleph sx eval PRNBleph VF PRN

## 2019-07-16 RX ORDER — TAMSULOSIN HYDROCHLORIDE 0.4 MG/1
CAPSULE ORAL
Qty: 30 CAP | Refills: 3 | Status: SHIPPED | OUTPATIENT
Start: 2019-07-16 | End: 2019-11-17 | Stop reason: SDUPTHER

## 2019-07-17 RX ORDER — QUINAPRIL 20 MG/1
TABLET ORAL
Qty: 90 TAB | Refills: 3 | Status: SHIPPED | OUTPATIENT
Start: 2019-07-17 | End: 2020-08-05

## 2019-07-17 RX ORDER — CITALOPRAM 20 MG/1
TABLET, FILM COATED ORAL
Qty: 90 TAB | Refills: 3 | Status: SHIPPED | OUTPATIENT
Start: 2019-07-17 | End: 2020-07-14

## 2019-08-13 RX ORDER — FENOFIBRATE 145 MG/1
TABLET, COATED ORAL
Qty: 90 TAB | Refills: 3 | Status: SHIPPED | OUTPATIENT
Start: 2019-08-13 | End: 2019-12-18

## 2019-09-18 RX ORDER — FINASTERIDE 5 MG/1
TABLET, FILM COATED ORAL
Qty: 90 TAB | Refills: 3 | Status: SHIPPED | OUTPATIENT
Start: 2019-09-18 | End: 2019-12-18 | Stop reason: SDUPTHER

## 2019-11-18 RX ORDER — TAMSULOSIN HYDROCHLORIDE 0.4 MG/1
CAPSULE ORAL
Qty: 120 CAP | Refills: 0 | Status: SHIPPED | OUTPATIENT
Start: 2019-11-18 | End: 2019-12-18 | Stop reason: SDUPTHER

## 2019-11-19 DIAGNOSIS — E11.21 TYPE 2 DIABETES MELLITUS WITH NEPHROPATHY (HCC): Primary | ICD-10-CM

## 2019-11-19 RX ORDER — ATORVASTATIN CALCIUM 40 MG/1
TABLET, FILM COATED ORAL
Qty: 90 TAB | Refills: 1 | Status: SHIPPED | OUTPATIENT
Start: 2019-11-19 | End: 2020-04-15

## 2019-11-19 NOTE — PROGRESS NOTES
HPI:   Check up  F/u visit for routine evaluation of HTN, T2DM, hyperlipidemia  A1C/chol 5.9/159 June 2019; recent A1C unchanged at 5.9  No acute issues  w/o chest pain/abd. discomfort; no dyspnea, cough or pedal edema; denies constitutional complaints of fever, night sweats or wt loss; no evidence of GI/ hemorrhage    ROS is otherwise negative.     Past Medical History:   Diagnosis Date    [facility]osis ([facility] [address]     Bilateral shoulder pain     Chronic airway obstruction, not elsewhere classified     Depressive disorder, not elsewhere classified     Diabetes mellitus ([facility] [address]     Esophagitis, unspecified     Generalized osteoarthrosis, involving multiple sites     GERD (gastroesophageal reflux disease)     Headache(784.0)     Hematuria     neg cysto    Hypercholesterolemia     Hypercholesterolemia     Hypertension     Mixed hyperlipidemia     Neck pain     Pancreatitis 6/2014    Rheumatic fever     w/o sequelae    Type II or unspecified type diabetes mellitus without mention of complication, not stated as uncontrolled     Unspecified hyperplasia of prostate with urinary obstruction and other lower urinary tract symptoms (LUTS)        Past Surgical History:   Procedure Laterality Date    HX APPENDECTOMY      HX COLONOSCOPY  7/26/2013    diverticulosis    HX OPEN REDUCTION INTERNAL FIXATION Right     clavicle fx       Social History     Socioeconomic History    Marital status:      Spouse name: Not on file    Number of children: Not on file    Years of education: Not on file    Highest education level: Not on file   Occupational History    Not on file   Social Needs    Financial resource strain: Not on file    Food insecurity:     Worry: Not on file     Inability: Not on file    Transportation needs:     Medical: Not on file     Non-medical: Not on file   Tobacco Use    Smoking status: Former Smoker     Packs/day: 1.00     Years: 40.00     Pack years: 40.00     Last attempt to quit: 4/10/2011     Years since quittin.6    Smokeless tobacco: Former User   Substance and Sexual Activity    Alcohol use: No     Alcohol/week: 0.0 standard drinks     Comment: a few beers    Drug use: No    Sexual activity: Not on file   Lifestyle    Physical activity:     Days per week: Not on file     Minutes per session: Not on file    Stress: Not on file   Relationships    Social connections:     Talks on phone: Not on file     Gets together: Not on file     Attends [employer] service: Not on file     Active member of club or organization: Not on file     Attends meetings of clubs or organizations: Not on file     Relationship status: Not on file    Intimate partner violence:     Fear of current or ex partner: Not on file     Emotionally abused: Not on file     Physically abused: Not on file     Forced sexual activity: Not on file   Other Topics Concern    Not on file   Social History Narrative    Not on file       Allergies   Allergen Reactions    Neurontin [Gabapentin] Other (comments)     confusion    Topamax [Topiramate] Other (comments)     confusion       Family History   Problem Relation Age of Onset    Cancer Father     Cancer Other     Heart Disease Maternal Grandmother     Heart Disease Paternal Grandfather     Other Sister         brain hemorrhage       Current Outpatient Medications   Medication Sig Dispense Refill    atorvastatin (LIPITOR) 40 mg tablet TAKE 1 TABLET DAILY 90 Tab 1    tamsulosin (FLOMAX) 0.4 mg capsule take 1 capsule by mouth once daily 120 Cap 0    finasteride (PROSCAR) 5 mg tablet take 1 tablet by mouth once daily 90 Tab 3    quinapril (ACCUPRIL) 20 mg tablet TAKE 1 TABLET NIGHTLY 90 Tab 3    citalopram (CELEXA) 20 mg tablet TAKE 1 TABLET DAILY 90 Tab 3    pioglitazone (ACTOS) 15 mg tablet 1 qd 90 Tab 3    esomeprazole (NEXIUM) 40 mg capsule TAKE 1 CAPSULE DAILY 90 Cap 3    fenofibrate nanocrystallized (TRICOR) 145 mg tablet TAKE 1 TABLET DAILY 90 Tab 3    quinapril (ACCUPRIL) 20 mg tablet TAKE 1 TABLET NIGHTLY 90 Tab 3    zolpidem (AMBIEN) 10 mg tablet   0    naproxen sodium (ALEVE) 220 mg tablet Take 220 mg by mouth two (2) times daily as needed.  loratadine (CLARITIN) 10 mg tablet Take 10 mg by mouth daily as needed.  FOLIC ACID/MULTIVIT-MIN/LUTEIN (CENTRUM SILVER PO) Take  by mouth.  cyclobenzaprine (FLEXERIL) 10 mg tablet Take  by mouth three (3) times daily as needed for Muscle Spasm(s).  hydrocodone-acetaminophen (LORTAB)  mg per tablet Take 1 Tab by mouth. Visit Vitals  /80 (BP 1 Location: Right arm, BP Patient Position: Sitting)   Pulse 82   Temp 97.7 °F (36.5 °C) (Tympanic)   Resp 16   Ht 5' 10\" (1.778 m)   Wt 190 lb (86.2 kg)   SpO2 97%   BMI 27.26 kg/m²       PE  Well nourished in NAD  HEENT:   OP: clear. Neck: supple w/o mass or bruits. Chest: clear. CV: RRR w/o m,r,g; pulses intact. Abd: soft, NT, w/o HSM or mass. Rectal: heme neg; prostate 3 FBS and smooth  Ext: w/o edema. Neuro: NF. Assessment and Plan    Encounter Diagnoses   Name Primary?     Routine general medical examination at a health care facility Yes    Type 2 diabetes mellitus with nephropathy ([facility] [address]     Essential hypertension, benign     Mixed hyperlipidemia        BP @ goal  Labs to assess metabolic parameters (E5QO/[id number]) reviewed  Up to date with eye exams; colo 7/2013 (diverticula); flu vacc already given  Continue dietary/exercise efforts  No change in rx  OV 4 mos or prn  I have explained plan to patient and the patient verbalizes understanding

## 2019-12-11 ENCOUNTER — HOSPITAL ENCOUNTER (OUTPATIENT)
Dept: LAB | Age: 69
Discharge: HOME | End: 2019-12-11
Payer: COMMERCIAL

## 2019-12-11 DIAGNOSIS — E11.21 TYPE 2 DIABETES MELLITUS WITH NEPHROPATHY (HCC): ICD-10-CM

## 2019-12-11 LAB
ALBUMIN SERPL-MCNC: 4.1 G/DL (ref 3.4–5)
ALBUMIN/GLOB SERPL: 1.4 {RATIO} (ref 0.8–1.7)
ALP SERPL-CCNC: 46 U/L (ref 45–117)
ALT SERPL-CCNC: 47 U/L (ref 16–61)
ANION GAP SERPL CALC-SCNC: 5 MMOL/L (ref 3–18)
AST SERPL-CCNC: 31 U/L (ref 10–38)
BASOPHILS # BLD: 0 K/UL (ref 0–0.1)
BASOPHILS NFR BLD: 0 % (ref 0–2)
BILIRUB SERPL-MCNC: 0.6 MG/DL (ref 0.2–1)
BUN SERPL-MCNC: 26 MG/DL (ref 7–18)
BUN/CREAT SERPL: 20 (ref 12–20)
CALCIUM SERPL-MCNC: 10.7 MG/DL (ref 8.5–10.1)
CHLORIDE SERPL-SCNC: 105 MMOL/L (ref 100–111)
CO2 SERPL-SCNC: 29 MMOL/L (ref 21–32)
CREAT SERPL-MCNC: 1.33 MG/DL (ref 0.6–1.3)
DIFFERENTIAL METHOD BLD: ABNORMAL
EOSINOPHIL # BLD: 0.3 K/UL (ref 0–0.4)
EOSINOPHIL NFR BLD: 4 % (ref 0–5)
ERYTHROCYTE [DISTWIDTH] IN BLOOD BY AUTOMATED COUNT: 12.8 % (ref 11.6–14.5)
EST. AVERAGE GLUCOSE BLD GHB EST-MCNC: 123 MG/DL
GLOBULIN SER CALC-MCNC: 3 G/DL (ref 2–4)
GLUCOSE SERPL-MCNC: 107 MG/DL (ref 74–99)
HBA1C MFR BLD: 5.9 % (ref 4.2–5.6)
HCT VFR BLD AUTO: 43.1 % (ref 36–48)
HGB BLD-MCNC: 14.2 G/DL (ref 13–16)
LYMPHOCYTES # BLD: 2.2 K/UL (ref 0.9–3.6)
LYMPHOCYTES NFR BLD: 33 % (ref 21–52)
MCH RBC QN AUTO: 30.7 PG (ref 24–34)
MCHC RBC AUTO-ENTMCNC: 32.9 G/DL (ref 31–37)
MCV RBC AUTO: 93.3 FL (ref 74–97)
MONOCYTES # BLD: 0.7 K/UL (ref 0.05–1.2)
MONOCYTES NFR BLD: 10 % (ref 3–10)
NEUTS SEG # BLD: 3.6 K/UL (ref 1.8–8)
NEUTS SEG NFR BLD: 53 % (ref 40–73)
PLATELET # BLD AUTO: 231 K/UL (ref 135–420)
PMV BLD AUTO: 11.8 FL (ref 9.2–11.8)
POTASSIUM SERPL-SCNC: 4.3 MMOL/L (ref 3.5–5.5)
PROT SERPL-MCNC: 7.1 G/DL (ref 6.4–8.2)
RBC # BLD AUTO: 4.62 M/UL (ref 4.7–5.5)
SODIUM SERPL-SCNC: 139 MMOL/L (ref 136–145)
WBC # BLD AUTO: 6.7 K/UL (ref 4.6–13.2)

## 2019-12-11 PROCEDURE — 80053 COMPREHEN METABOLIC PANEL: CPT

## 2019-12-11 PROCEDURE — 85025 COMPLETE CBC W/AUTO DIFF WBC: CPT

## 2019-12-11 PROCEDURE — 36415 COLL VENOUS BLD VENIPUNCTURE: CPT

## 2019-12-11 PROCEDURE — 83036 HEMOGLOBIN GLYCOSYLATED A1C: CPT

## 2019-12-11 PROCEDURE — 82043 UR ALBUMIN QUANTITATIVE: CPT

## 2019-12-11 PROCEDURE — 80061 LIPID PANEL: CPT

## 2019-12-12 LAB
CHOLEST SERPL-MCNC: 168 MG/DL
CREAT UR-MCNC: 135 MG/DL (ref 30–125)
HDLC SERPL-MCNC: 27 MG/DL (ref 40–60)
HDLC SERPL: 6.2 {RATIO} (ref 0–5)
LDLC SERPL CALC-MCNC: 80.4 MG/DL (ref 0–100)
LIPID PROFILE,FLP: ABNORMAL
MICROALBUMIN UR-MCNC: 1.28 MG/DL (ref 0–3)
MICROALBUMIN/CREAT UR-RTO: 9 MG/G (ref 0–30)
TRIGL SERPL-MCNC: 303 MG/DL (ref ?–150)
VLDLC SERPL CALC-MCNC: 60.6 MG/DL

## 2019-12-18 ENCOUNTER — OFFICE VISIT (OUTPATIENT)
Dept: FAMILY MEDICINE CLINIC | Age: 69
End: 2019-12-18

## 2019-12-18 VITALS
TEMPERATURE: 97.7 F | SYSTOLIC BLOOD PRESSURE: 138 MMHG | HEIGHT: 70 IN | RESPIRATION RATE: 16 BRPM | DIASTOLIC BLOOD PRESSURE: 80 MMHG | OXYGEN SATURATION: 97 % | HEART RATE: 82 BPM | BODY MASS INDEX: 27.2 KG/M2 | WEIGHT: 190 LBS

## 2019-12-18 DIAGNOSIS — Z00.00 ROUTINE GENERAL MEDICAL EXAMINATION AT A HEALTH CARE FACILITY: Primary | ICD-10-CM

## 2019-12-18 DIAGNOSIS — E78.2 MIXED HYPERLIPIDEMIA: ICD-10-CM

## 2019-12-18 DIAGNOSIS — I10 ESSENTIAL HYPERTENSION, BENIGN: ICD-10-CM

## 2019-12-18 DIAGNOSIS — E11.21 TYPE 2 DIABETES MELLITUS WITH NEPHROPATHY (HCC): ICD-10-CM

## 2019-12-18 RX ORDER — FINASTERIDE 5 MG/1
TABLET, FILM COATED ORAL
Qty: 90 TAB | Refills: 3 | Status: SHIPPED | OUTPATIENT
Start: 2019-12-18 | End: 2020-12-21 | Stop reason: SDUPTHER

## 2019-12-18 RX ORDER — TAMSULOSIN HYDROCHLORIDE 0.4 MG/1
CAPSULE ORAL
Qty: 90 CAP | Refills: 3 | Status: SHIPPED | OUTPATIENT
Start: 2019-12-18 | End: 2021-02-01 | Stop reason: SDUPTHER

## 2019-12-18 NOTE — PROGRESS NOTES
Chief Complaint   Patient presents with    Complete Physical       Pt preferred language for health care discussion is english. Is someone accompanying this pt? no    Is the patient using any DME equipment during [address]? no    Depression Screening:  3 most recent PHQ Screens 1/16/2018 10/26/2017   Little interest or pleasure in doing things Not at all Not at all   Feeling down, depressed, irritable, or hopeless Not at all Not at all   Total Score PHQ 2 0 0       Learning Assessment:  Learning Assessment 11/22/2017 8/18/2015 3/3/2015   PRIMARY LEARNER Patient Patient Patient   HIGHEST LEVEL OF EDUCATION - PRIMARY LEARNER  DID NOT GRADUATE HIGH SCHOOL DID NOT GRADUATE HIGH SCHOOL -   BARRIERS PRIMARY LEARNER - [address] CAREGIVER - No No   PRIMARY LANGUAGE ENGLISH ENGLISH ENGLISH   LEARNER PREFERENCE PRIMARY DEMONSTRATION DEMONSTRATION LISTENING   ANSWERED BY patient patient patient   RELATIONSHIP SELF SELF SELF   ASSESSMENT COMMENT - no deficiet observed -       Abuse Screening:  Abuse Screening Questionnaire 1/16/2018 10/26/2017 11/16/2016   Do you ever feel afraid of your partner? N N N   Are you in a relationship with someone who physically or mentally threatens you? N N N   Is it safe for you to go home? Y Y Y       Fall Risk  Fall Risk Assessment, last 12 mths 12/18/2019   Able to walk? Yes   Fall in past 12 months? Yes   Fall with injury? No   Number of falls in past 12 months 1   Fall Risk Score 1           Advance Directive:  1. Do you have an advance directive in place? Patient Reply:no    2. If not, would you like material regarding how to put one in place? Patient Reply. Coordination of Care:  1. Have you been to the ER, urgent care clinic since your last visit? Hospitalized since your last visit? no    2. Have you seen or consulted any other health care providers outside of the [address] [place] [address] since your last visit? Include any pap smears or colon screening.  no    Provider prefers to do his own med reconciliation

## 2019-12-18 NOTE — PATIENT INSTRUCTIONS
DASH Diet: Care Instructions  Your Care Instructions    The DASH diet is an eating plan that can help lower your blood pressure. DASH stands for Dietary Approaches to Stop Hypertension. Hypertension is high blood pressure. The DASH diet focuses on eating foods that are high in calcium, potassium, and magnesium. These nutrients can lower blood pressure. The foods that are highest in these nutrients are fruits, vegetables, low-fat dairy products, nuts, seeds, and legumes. But taking calcium, potassium, and magnesium supplements instead of eating foods that are high in those nutrients does not have the same effect. The DASH diet also includes whole grains, fish, and poultry. The DASH diet is one of several lifestyle changes your doctor may recommend to lower your high blood pressure. Your doctor may also want you to decrease the amount of sodium in your diet. Lowering sodium while following the DASH diet can lower blood pressure even further than just the DASH diet alone. Follow-up care is a key part of your treatment and safety. Be sure to make and go to all appointments, and call your doctor if you are having problems. It's also a good idea to know your test results and keep a list of the medicines you take. How can you care for yourself at home? Following the DASH diet  · Eat 4 to 5 servings of fruit each day. A serving is 1 medium-sized piece of fruit, ½ cup chopped or canned fruit, 1/4 cup dried fruit, or 4 ounces (½ cup) of fruit juice. Choose fruit more often than fruit juice. · Eat 4 to 5 servings of vegetables each day. A serving is 1 cup of lettuce or raw leafy vegetables, ½ cup of chopped or cooked vegetables, or 4 ounces (½ cup) of vegetable juice. Choose vegetables more often than vegetable juice. · Get 2 to 3 servings of low-fat and fat-free dairy each day. A serving is 8 ounces of milk, 1 cup of yogurt, or 1 ½ ounces of cheese. · Eat 6 to 8 servings of grains each day.  A serving is 1 slice of bread, 1 ounce of dry cereal, or ½ cup of cooked rice, pasta, or cooked cereal. Try to choose whole-grain products as much as possible. · Limit lean meat, poultry, and fish to 2 servings each day. A serving is 3 ounces, about the size of a deck of cards. · Eat 4 to 5 servings of nuts, seeds, and legumes (cooked dried beans, lentils, and split peas) each week. A serving is 1/3 cup of nuts, 2 tablespoons of seeds, or ½ cup of cooked beans or peas. · Limit fats and oils to 2 to 3 servings each day. A serving is 1 teaspoon of vegetable oil or 2 tablespoons of salad dressing. · Limit sweets and added sugars to 5 servings or less a week. A serving is 1 tablespoon jelly or jam, ½ cup sorbet, or 1 cup of lemonade. · Eat less than 2,300 milligrams (mg) of sodium a day. If you limit your sodium to 1,500 mg a day, you can lower your blood pressure even more. Tips for success  · Start small. Do not try to make dramatic changes to your diet all at once. You might feel that you are missing out on your favorite foods and then be more likely to not follow the plan. Make small changes, and stick with them. Once those changes become habit, add a few more changes. · Try some of the following:  ? Make it a goal to eat a fruit or vegetable at every meal and at snacks. This will make it easy to get the recommended amount of fruits and vegetables each day. ? Try yogurt topped with fruit and nuts for a snack or healthy dessert. ? Add lettuce, tomato, cucumber, and onion to sandwiches. ? Combine a ready-made pizza crust with low-fat mozzarella cheese and lots of vegetable toppings. Try using tomatoes, squash, spinach, broccoli, carrots, cauliflower, and onions. ? Have a variety of cut-up vegetables with a low-fat dip as an appetizer instead of chips and dip. ? Sprinkle sunflower seeds or chopped almonds over salads. Or try adding chopped walnuts or almonds to cooked vegetables.   ? Try some vegetarian meals using beans and peas. Add garbanzo or kidney beans to salads. Make burritos and tacos with mashed sheets beans or black beans. Where can you learn more? Go to http://jhonatan-mayda.info/. Enter R492 in the search box to learn more about \"DASH Diet: Care Instructions. \"  Current as of: April 9, 2019  Content Version: 12.2  © 0483-1340 [employer]. Care instructions adapted under license by [employer] (which disclaims liability or warranty for this information). If you have questions about a medical condition or this instruction, always ask your healthcare professional. [address] any warranty or liability for your use of this information.

## 2020-04-15 RX ORDER — ATORVASTATIN CALCIUM 40 MG/1
TABLET, FILM COATED ORAL
Qty: 90 TAB | Refills: 1 | Status: SHIPPED | OUTPATIENT
Start: 2020-04-15 | End: 2020-11-06

## 2020-04-15 RX ORDER — ESOMEPRAZOLE MAGNESIUM 40 MG/1
CAPSULE, DELAYED RELEASE ORAL
Qty: 90 CAP | Refills: 3 | Status: SHIPPED | OUTPATIENT
Start: 2020-04-15 | End: 2021-03-03 | Stop reason: SDUPTHER

## 2020-05-20 DIAGNOSIS — E11.21 TYPE 2 DIABETES MELLITUS WITH NEPHROPATHY (HCC): Primary | ICD-10-CM

## 2020-05-20 NOTE — PROGRESS NOTES
Elvira Koroma is a 77 y.o. male evaluated via audio only technology on 5/27/2020. Consent: He and/or his health care decision maker is aware that he may receive a bill for this audio only encounter, depending on his insurance coverage, and has provided verbal consent to proceed: Yes    I communicated with the patient and/or health care decision maker about the nature and details of the following:  Assessment & Plan:   Diagnoses and all orders for this visit:    1. Type 2 diabetes mellitus with nephropathy ([facility] [address]    2. Essential hypertension, benign    3. Mixed hyperlipidemia      HTN/T2DM/hyperlipidemia - continue dietary/exercise efforts  No change in rx  OV 4-6 mos or prn  I have explained plan to patient and the patient verbalizes understanding    12  Subjective:   Elvira Chavez is a 77 y.o. male who was seen for Hypertension  F/u visit for routine evaluation of HTN, T2DM, hyperlipidemia  A1C/chol 5.9/168 Dec 2019; recent A1C 6.3  No acute issues  Doing well w/o increased dyspnea/cougn; no pedal edema; no chest/abdominal pain or evidence of GI/ bleeding; w/o constitutional complaints      Prior to Admission medications    Medication Sig Start Date End Date Taking?  Authorizing Provider   atorvastatin (LIPITOR) 40 mg tablet TAKE 1 TABLET DAILY 4/15/20   Zenaida Essex, MD   esomeprazole (NEXIUM) 40 mg capsule TAKE 1 CAPSULE DAILY 4/15/20   Zenaida Essex, MD   tamsulosin [facility]) 0.4 mg capsule 1 qd 12/18/19   Zenaida Essex, MD   finasteride (PROSCAR) 5 mg tablet take 1 tablet by mouth once daily 12/18/19   Zenaida Essex, MD   quinapril (ACCUPRIL) 20 mg tablet TAKE 1 TABLET NIGHTLY 7/17/19   Zenaida Essex, MD   citalopram (CELEXA) 20 mg tablet TAKE 1 TABLET DAILY 7/17/19   Zenaida Essex, MD   pioglitazone (ACTOS) 15 mg tablet 1 qd 6/13/19   Zenaida Essex, MD   fenofibrate nanocrystallized (TRICOR) 145 mg tablet TAKE 1 TABLET DAILY 8/12/18   Zenaida Essex, MD   quinapril (Benna Bolster) 20 mg tablet TAKE 1 TABLET NIGHTLY 7/17/18   Jeanna Davenport MD   zolpidem [facility]) 10 mg tablet  10/19/17   Provider, Historical   naproxen sodium (ALEVE) 220 mg tablet Take 220 mg by mouth two (2) times daily as needed. Provider, Historical   loratadine (CLARITIN) 10 mg tablet Take 10 mg by mouth daily as needed. Provider, Historical   FOLIC ACID/MULTIVIT-MIN/LUTEIN (CENTRUM SILVER PO) Take  by mouth. Provider, Historical   cyclobenzaprine (FLEXERIL) 10 mg tablet Take  by mouth three (3) times daily as needed for Muscle Spasm(s). Provider, Historical   hydrocodone-acetaminophen (LORTAB)  mg per tablet Take 1 Tab by mouth. Provider, Historical     Allergies   Allergen Reactions    Neurontin [Gabapentin] Other (comments)     confusion    Topamax [Topiramate] Other (comments)     confusion       Current Outpatient Medications   Medication Sig Dispense Refill    atorvastatin (LIPITOR) 40 mg tablet TAKE 1 TABLET DAILY 90 Tab 1    esomeprazole (NEXIUM) 40 mg capsule TAKE 1 CAPSULE DAILY 90 Cap 3    tamsulosin (FLOMAX) 0.4 mg capsule 1 qd 90 Cap 3    finasteride (PROSCAR) 5 mg tablet take 1 tablet by mouth once daily 90 Tab 3    quinapril (ACCUPRIL) 20 mg tablet TAKE 1 TABLET NIGHTLY 90 Tab 3    citalopram (CELEXA) 20 mg tablet TAKE 1 TABLET DAILY 90 Tab 3    pioglitazone (ACTOS) 15 mg tablet 1 qd 90 Tab 3    fenofibrate nanocrystallized (TRICOR) 145 mg tablet TAKE 1 TABLET DAILY 90 Tab 3    quinapril (ACCUPRIL) 20 mg tablet TAKE 1 TABLET NIGHTLY 90 Tab 3    zolpidem (AMBIEN) 10 mg tablet   0    naproxen sodium (ALEVE) 220 mg tablet Take 220 mg by mouth two (2) times daily as needed.  loratadine (CLARITIN) 10 mg tablet Take 10 mg by mouth daily as needed.  FOLIC ACID/MULTIVIT-MIN/LUTEIN (CENTRUM SILVER PO) Take  by mouth.  cyclobenzaprine (FLEXERIL) 10 mg tablet Take  by mouth three (3) times daily as needed for Muscle Spasm(s).       hydrocodone-acetaminophen (LORTAB)  mg per tablet Take 1 Tab by mouth. Allergies   Allergen Reactions    Neurontin [Gabapentin] Other (comments)     confusion    Topamax [Topiramate] Other (comments)     confusion     Past Medical History:   Diagnosis Date    Asbestosis ([facility] [address]     Bilateral shoulder pain     Chronic airway obstruction, not elsewhere classified     Depressive disorder, not elsewhere classified     Diabetes mellitus ([facility] [address]     Esophagitis, unspecified     Generalized osteoarthrosis, involving multiple sites     GERD (gastroesophageal reflux disease)     Headache(784.0)     Hematuria     neg cysto    Hypercholesterolemia     Hypercholesterolemia     Hypertension     Mixed hyperlipidemia     Neck pain     Pancreatitis 6/2014    Rheumatic fever     w/o sequelae    Type II or unspecified type diabetes mellitus without mention of complication, not stated as uncontrolled     Unspecified hyperplasia of prostate with urinary obstruction and other lower urinary tract symptoms (LUTS)      Past Surgical History:   Procedure Laterality Date    HX APPENDECTOMY      HX COLONOSCOPY  7/26/2013    diverticulosis    HX OPEN REDUCTION INTERNAL FIXATION Right     clavicle fx       ROS per HPI    I affirm this is a Patient-Initiated Episode with a Patient who has not had a related appointment within my department in the past 7 days or scheduled within the next 24 hours.     Total Time: minutes: 11-20 minutes    Note: not billable if this call serves to triage the patient into an appointment for the relevant concern      Jem Mujica MD

## 2020-05-22 ENCOUNTER — HOSPITAL ENCOUNTER (OUTPATIENT)
Dept: LAB | Age: 70
Discharge: HOME | End: 2020-05-22
Payer: COMMERCIAL

## 2020-05-22 DIAGNOSIS — E11.21 TYPE 2 DIABETES MELLITUS WITH NEPHROPATHY (HCC): ICD-10-CM

## 2020-05-22 LAB
ALBUMIN SERPL-MCNC: 3.9 G/DL (ref 3.4–5)
ALBUMIN/GLOB SERPL: 1.3 {RATIO} (ref 0.8–1.7)
ALP SERPL-CCNC: 47 U/L (ref 45–117)
ALT SERPL-CCNC: 45 U/L (ref 16–61)
ANION GAP SERPL CALC-SCNC: 5 MMOL/L (ref 3–18)
AST SERPL-CCNC: 31 U/L (ref 10–38)
BILIRUB SERPL-MCNC: 0.5 MG/DL (ref 0.2–1)
BUN SERPL-MCNC: 23 MG/DL (ref 7–18)
BUN/CREAT SERPL: 18 (ref 12–20)
CALCIUM SERPL-MCNC: 9.7 MG/DL (ref 8.5–10.1)
CHLORIDE SERPL-SCNC: 109 MMOL/L (ref 100–111)
CHOLEST SERPL-MCNC: 154 MG/DL
CO2 SERPL-SCNC: 29 MMOL/L (ref 21–32)
CREAT SERPL-MCNC: 1.31 MG/DL (ref 0.6–1.3)
EST. AVERAGE GLUCOSE BLD GHB EST-MCNC: 134 MG/DL
GLOBULIN SER CALC-MCNC: 3.1 G/DL (ref 2–4)
GLUCOSE SERPL-MCNC: 120 MG/DL (ref 74–99)
HBA1C MFR BLD: 6.3 % (ref 4.2–5.6)
HDLC SERPL-MCNC: 27 MG/DL (ref 40–60)
HDLC SERPL: 5.7 {RATIO} (ref 0–5)
LDLC SERPL CALC-MCNC: 83 MG/DL (ref 0–100)
LIPID PROFILE,FLP: ABNORMAL
POTASSIUM SERPL-SCNC: 4.5 MMOL/L (ref 3.5–5.5)
PROT SERPL-MCNC: 7 G/DL (ref 6.4–8.2)
SODIUM SERPL-SCNC: 143 MMOL/L (ref 136–145)
TRIGL SERPL-MCNC: 220 MG/DL (ref ?–150)
VLDLC SERPL CALC-MCNC: 44 MG/DL

## 2020-05-22 PROCEDURE — 80053 COMPREHEN METABOLIC PANEL: CPT

## 2020-05-22 PROCEDURE — 83036 HEMOGLOBIN GLYCOSYLATED A1C: CPT

## 2020-05-22 PROCEDURE — 80061 LIPID PANEL: CPT

## 2020-05-22 PROCEDURE — 36415 COLL VENOUS BLD VENIPUNCTURE: CPT

## 2020-05-27 ENCOUNTER — VIRTUAL VISIT (OUTPATIENT)
Dept: FAMILY MEDICINE CLINIC | Age: 70
End: 2020-05-27

## 2020-05-27 DIAGNOSIS — E11.21 TYPE 2 DIABETES MELLITUS WITH NEPHROPATHY (HCC): Primary | ICD-10-CM

## 2020-05-27 DIAGNOSIS — E78.2 MIXED HYPERLIPIDEMIA: ICD-10-CM

## 2020-05-27 DIAGNOSIS — I10 ESSENTIAL HYPERTENSION, BENIGN: ICD-10-CM

## 2020-06-07 RX ORDER — PIOGLITAZONEHYDROCHLORIDE 15 MG/1
TABLET ORAL
Qty: 90 TAB | Refills: 3 | Status: SHIPPED | OUTPATIENT
Start: 2020-06-07 | End: 2021-05-24

## 2020-06-16 ENCOUNTER — DOCUMENTATION ONLY (OUTPATIENT)
Dept: FAMILY MEDICINE CLINIC | Age: 70
End: 2020-06-16

## 2020-07-14 RX ORDER — CITALOPRAM 20 MG/1
TABLET, FILM COATED ORAL
Qty: 90 TAB | Refills: 3 | Status: SHIPPED | OUTPATIENT
Start: 2020-07-14

## 2020-07-27 NOTE — PATIENT DISCUSSION
Recommended artificial tears to use: 1 drop 4x a day in both eyes.  use especially with extended near tasking.

## 2020-08-05 RX ORDER — FENOFIBRATE 145 MG/1
TABLET, COATED ORAL
Qty: 90 TAB | Refills: 3 | Status: SHIPPED | OUTPATIENT
Start: 2020-08-05

## 2020-08-05 RX ORDER — QUINAPRIL 20 MG/1
TABLET ORAL
Qty: 90 TAB | Refills: 3 | Status: SHIPPED | OUTPATIENT
Start: 2020-08-05

## 2020-09-25 ENCOUNTER — IMPORTED ENCOUNTER (OUTPATIENT)
Dept: URBAN - NONMETROPOLITAN AREA CLINIC 1 | Facility: CLINIC | Age: 70
End: 2020-09-25

## 2020-09-25 PROCEDURE — 99214 OFFICE O/P EST MOD 30 MIN: CPT

## 2020-11-06 RX ORDER — ATORVASTATIN CALCIUM 40 MG/1
TABLET, FILM COATED ORAL
Qty: 90 TAB | Refills: 1 | Status: SHIPPED | OUTPATIENT
Start: 2020-11-06 | End: 2021-05-06

## 2020-11-09 DIAGNOSIS — E11.21 TYPE 2 DIABETES MELLITUS WITH NEPHROPATHY (HCC): Primary | ICD-10-CM

## 2020-11-09 DIAGNOSIS — Z11.59 NEED FOR HEPATITIS C SCREENING TEST: ICD-10-CM

## 2020-11-09 NOTE — PROGRESS NOTES
HPI:   Check up  + hx of HTN, T2DM, hyperlipidemia  A1C/chol 6.3/154 May 2020  Mild depression on rx  No acute issues  Currently w/o chest pain/abd. discomfort; no dyspnea, cough or pedal edema; denies constitutional complaints of fever, night sweats or wt loss; no evidence of GI/ hemorrhage    ROS is otherwise negative.     Past Medical History:   Diagnosis Date    Asbestosis ([facility] [address]     Bilateral shoulder pain     Chronic airway obstruction, not elsewhere classified     Depressive disorder, not elsewhere classified     Diabetes mellitus ([facility] [address]     Esophagitis, unspecified     Generalized osteoarthrosis, involving multiple sites     GERD (gastroesophageal reflux disease)     Headache(784.0)     Hematuria     neg cysto    Hypercholesterolemia     Hypercholesterolemia     Hypertension     Mixed hyperlipidemia     Neck pain     Pancreatitis 6/2014    Rheumatic fever     w/o sequelae    Type II or unspecified type diabetes mellitus without mention of complication, not stated as uncontrolled     Unspecified hyperplasia of prostate with urinary obstruction and other lower urinary tract symptoms (LUTS)        Past Surgical History:   Procedure Laterality Date    HX APPENDECTOMY      HX COLONOSCOPY  7/26/2013    diverticulosis    HX OPEN REDUCTION INTERNAL FIXATION Right     clavicle fx       Social History     Socioeconomic History    Marital status:      Spouse name: Not on file    Number of children: Not on file    Years of education: Not on file    Highest education level: Not on file   Occupational History    Not on file   Social Needs    Financial resource strain: Not on file    Food insecurity     Worry: Not on file     Inability: Not on file    Transportation needs     Medical: Not on file     Non-medical: Not on file   Tobacco Use    Smoking status: Former Smoker     Packs/day: 1.00     Years: 40.00     Pack years: 40.00     Last attempt to quit: 4/10/2011     Years since quittin.6    Smokeless tobacco: Former User   Substance and Sexual Activity    Alcohol use: No     Alcohol/week: 0.0 standard drinks     Comment: a few beers    Drug use: No    Sexual activity: Not on file   Lifestyle    Physical activity     Days per week: Not on file     Minutes per session: Not on file    Stress: Not on file   Relationships    Social connections     Talks on phone: Not on file     Gets together: Not on file     Attends Judaism service: Not on file     Active member of club or organization: Not on file     Attends meetings of clubs or organizations: Not on file     Relationship status: Not on file    Intimate partner violence     Fear of current or ex partner: Not on file     Emotionally abused: Not on file     Physically abused: Not on file     Forced sexual activity: Not on file   Other Topics Concern    Not on file   Social History Narrative    Not on file       Allergies   Allergen Reactions    Neurontin [Gabapentin] Other (comments)     confusion    Topamax [Topiramate] Other (comments)     confusion       Family History   Problem Relation Age of Onset    Cancer Father     Cancer Other     Heart Disease Maternal Grandmother     Heart Disease Paternal Grandfather     Other Sister         brain hemorrhage       Current Outpatient Medications   Medication Sig Dispense Refill    atorvastatin (LIPITOR) 40 mg tablet TAKE 1 TABLET DAILY 90 Tab 1    fenofibrate nanocrystallized (TRICOR) 145 mg tablet TAKE 1 TABLET DAILY 90 Tab 3    quinapriL (ACCUPRIL) 20 mg tablet TAKE 1 TABLET NIGHTLY 90 Tab 3    citalopram (CELEXA) 20 mg tablet TAKE 1 TABLET DAILY 90 Tab 3    pioglitazone (ACTOS) 15 mg tablet take 1 tablet by mouth once daily 90 Tab 3    esomeprazole (NEXIUM) 40 mg capsule TAKE 1 CAPSULE DAILY 90 Cap 3    tamsulosin (FLOMAX) 0.4 mg capsule 1 qd 90 Cap 3    finasteride (PROSCAR) 5 mg tablet take 1 tablet by mouth once daily 90 Tab 3    quinapril (ACCUPRIL) 20 mg tablet TAKE 1 TABLET NIGHTLY 90 Tab 3    zolpidem (AMBIEN) 10 mg tablet   0    naproxen sodium (ALEVE) 220 mg tablet Take 220 mg by mouth two (2) times daily as needed.  loratadine (CLARITIN) 10 mg tablet Take 10 mg by mouth daily as needed.  FOLIC ACID/MULTIVIT-MIN/LUTEIN (CENTRUM SILVER PO) Take  by mouth.  cyclobenzaprine (FLEXERIL) 10 mg tablet Take  by mouth three (3) times daily as needed for Muscle Spasm(s).  hydrocodone-acetaminophen (LORTAB)  mg per tablet Take 1 Tab by mouth. Visit Vitals  /80   Pulse 70   Temp 98 °F (36.7 °C)   Resp 15   Ht 5' 10\" (1.778 m)   Wt 188 lb (85.3 kg)   SpO2 97%   BMI 26.98 kg/m²       PE  WDWN in NAD  HEENT:  OP: clear. Neck: supple w/o mass or bruits. Chest: clear. CV: RRR w/o m,r,g; pulses intact. Abd: soft, NT, w/o HSM or mass. Rectal: heme neg; prostate 3 FBS and smooth  Ext: w/o edema. Neuro: NF. Assessment and Plan    Encounter Diagnoses   Name Primary?     Routine general medical examination at a health care facility Yes    Recurrent depression ([address]     Type 2 diabetes mellitus with nephropathy ([address]     Essential hypertension, benign     Mixed hyperlipidemia        HTN - controlled  T2DM/hyperlipidemia - continue dietary/exercise efforts  Depression - mild on rx  [place] 7/2013 (diverticula); flu vacc given  No change in rx  OV 4 mos or prn  I have explained plan to patient and the patient verbalizes understanding

## 2020-12-03 ENCOUNTER — HOSPITAL ENCOUNTER (OUTPATIENT)
Dept: LAB | Age: 70
Discharge: HOME | End: 2020-12-03
Payer: COMMERCIAL

## 2020-12-03 ENCOUNTER — OFFICE VISIT (OUTPATIENT)
Dept: FAMILY MEDICINE CLINIC | Age: 70
End: 2020-12-03
Payer: COMMERCIAL

## 2020-12-03 VITALS
BODY MASS INDEX: 26.92 KG/M2 | WEIGHT: 188 LBS | OXYGEN SATURATION: 97 % | SYSTOLIC BLOOD PRESSURE: 130 MMHG | DIASTOLIC BLOOD PRESSURE: 80 MMHG | HEIGHT: 70 IN | HEART RATE: 70 BPM | TEMPERATURE: 98 F | RESPIRATION RATE: 15 BRPM

## 2020-12-03 DIAGNOSIS — Z00.00 ROUTINE GENERAL MEDICAL EXAMINATION AT A HEALTH CARE FACILITY: Primary | ICD-10-CM

## 2020-12-03 DIAGNOSIS — E11.21 TYPE 2 DIABETES MELLITUS WITH NEPHROPATHY (HCC): ICD-10-CM

## 2020-12-03 DIAGNOSIS — E78.2 MIXED HYPERLIPIDEMIA: ICD-10-CM

## 2020-12-03 DIAGNOSIS — Z23 NEEDS FLU SHOT: ICD-10-CM

## 2020-12-03 DIAGNOSIS — F33.9 RECURRENT DEPRESSION (HCC): ICD-10-CM

## 2020-12-03 DIAGNOSIS — Z11.59 NEED FOR HEPATITIS C SCREENING TEST: ICD-10-CM

## 2020-12-03 DIAGNOSIS — I10 ESSENTIAL HYPERTENSION, BENIGN: ICD-10-CM

## 2020-12-03 LAB
ALBUMIN SERPL-MCNC: 4.2 G/DL (ref 3.4–5)
ALBUMIN/GLOB SERPL: 1.3 {RATIO} (ref 0.8–1.7)
ALP SERPL-CCNC: 47 U/L (ref 45–117)
ALT SERPL-CCNC: 58 U/L (ref 16–61)
ANION GAP SERPL CALC-SCNC: 6 MMOL/L (ref 3–18)
AST SERPL-CCNC: 34 U/L (ref 10–38)
BASOPHILS # BLD: 0 K/UL (ref 0–0.1)
BASOPHILS NFR BLD: 1 % (ref 0–2)
BILIRUB SERPL-MCNC: 0.5 MG/DL (ref 0.2–1)
BUN SERPL-MCNC: 29 MG/DL (ref 7–18)
BUN/CREAT SERPL: 21 (ref 12–20)
CALCIUM SERPL-MCNC: 9.7 MG/DL (ref 8.5–10.1)
CHLORIDE SERPL-SCNC: 105 MMOL/L (ref 100–111)
CHOLEST SERPL-MCNC: 171 MG/DL
CO2 SERPL-SCNC: 29 MMOL/L (ref 21–32)
CREAT SERPL-MCNC: 1.37 MG/DL (ref 0.6–1.3)
DIFFERENTIAL METHOD BLD: NORMAL
EOSINOPHIL # BLD: 0.2 K/UL (ref 0–0.4)
EOSINOPHIL NFR BLD: 3 % (ref 0–5)
ERYTHROCYTE [DISTWIDTH] IN BLOOD BY AUTOMATED COUNT: 12.8 % (ref 11.6–14.5)
EST. AVERAGE GLUCOSE BLD GHB EST-MCNC: 123 MG/DL
GLOBULIN SER CALC-MCNC: 3.2 G/DL (ref 2–4)
GLUCOSE SERPL-MCNC: 122 MG/DL (ref 74–99)
HBA1C MFR BLD: 5.9 % (ref 4.2–5.6)
HCT VFR BLD AUTO: 43.4 % (ref 36–48)
HDLC SERPL-MCNC: 28 MG/DL (ref 40–60)
HDLC SERPL: 6.1 {RATIO} (ref 0–5)
HGB BLD-MCNC: 14.4 G/DL (ref 13–16)
LDLC SERPL CALC-MCNC: 86.2 MG/DL (ref 0–100)
LIPID PROFILE,FLP: ABNORMAL
LYMPHOCYTES # BLD: 1.9 K/UL (ref 0.9–3.6)
LYMPHOCYTES NFR BLD: 34 % (ref 21–52)
MCH RBC QN AUTO: 30 PG (ref 24–34)
MCHC RBC AUTO-ENTMCNC: 33.2 G/DL (ref 31–37)
MCV RBC AUTO: 90.4 FL (ref 74–97)
MONOCYTES # BLD: 0.5 K/UL (ref 0.05–1.2)
MONOCYTES NFR BLD: 9 % (ref 3–10)
NEUTS SEG # BLD: 3 K/UL (ref 1.8–8)
NEUTS SEG NFR BLD: 53 % (ref 40–73)
PLATELET # BLD AUTO: 250 K/UL (ref 135–420)
PMV BLD AUTO: 11.3 FL (ref 9.2–11.8)
POTASSIUM SERPL-SCNC: 4.5 MMOL/L (ref 3.5–5.5)
PROT SERPL-MCNC: 7.4 G/DL (ref 6.4–8.2)
RBC # BLD AUTO: 4.8 M/UL (ref 4.7–5.5)
SODIUM SERPL-SCNC: 140 MMOL/L (ref 136–145)
TRIGL SERPL-MCNC: 284 MG/DL (ref ?–150)
VLDLC SERPL CALC-MCNC: 56.8 MG/DL
WBC # BLD AUTO: 5.6 K/UL (ref 4.6–13.2)

## 2020-12-03 PROCEDURE — 99213 OFFICE O/P EST LOW 20 MIN: CPT | Performed by: INTERNAL MEDICINE

## 2020-12-03 PROCEDURE — G8754 DIAS BP LESS 90: HCPCS | Performed by: INTERNAL MEDICINE

## 2020-12-03 PROCEDURE — 80061 LIPID PANEL: CPT

## 2020-12-03 PROCEDURE — 86803 HEPATITIS C AB TEST: CPT

## 2020-12-03 PROCEDURE — 83036 HEMOGLOBIN GLYCOSYLATED A1C: CPT

## 2020-12-03 PROCEDURE — 3017F COLORECTAL CA SCREEN DOC REV: CPT | Performed by: INTERNAL MEDICINE

## 2020-12-03 PROCEDURE — 85025 COMPLETE CBC W/AUTO DIFF WBC: CPT

## 2020-12-03 PROCEDURE — 2022F DILAT RTA XM EVC RTNOPTHY: CPT | Performed by: INTERNAL MEDICINE

## 2020-12-03 PROCEDURE — G8752 SYS BP LESS 140: HCPCS | Performed by: INTERNAL MEDICINE

## 2020-12-03 PROCEDURE — G9717 DOC PT DX DEP/BP F/U NT REQ: HCPCS | Performed by: INTERNAL MEDICINE

## 2020-12-03 PROCEDURE — 90694 VACC AIIV4 NO PRSRV 0.5ML IM: CPT | Performed by: INTERNAL MEDICINE

## 2020-12-03 PROCEDURE — G8419 CALC BMI OUT NRM PARAM NOF/U: HCPCS | Performed by: INTERNAL MEDICINE

## 2020-12-03 PROCEDURE — 3044F HG A1C LEVEL LT 7.0%: CPT | Performed by: INTERNAL MEDICINE

## 2020-12-03 PROCEDURE — 1101F PT FALLS ASSESS-DOCD LE1/YR: CPT | Performed by: INTERNAL MEDICINE

## 2020-12-03 PROCEDURE — 36415 COLL VENOUS BLD VENIPUNCTURE: CPT

## 2020-12-03 PROCEDURE — 80053 COMPREHEN METABOLIC PANEL: CPT

## 2020-12-03 PROCEDURE — 90471 IMMUNIZATION ADMIN: CPT | Performed by: INTERNAL MEDICINE

## 2020-12-04 LAB
HCV AB SER IA-ACNC: 0.03 INDEX
HCV AB SERPL QL IA: NEGATIVE
HCV COMMENT,HCGAC: NORMAL

## 2020-12-21 RX ORDER — FINASTERIDE 5 MG/1
TABLET, FILM COATED ORAL
Qty: 90 TAB | Refills: 3 | Status: SHIPPED | OUTPATIENT
Start: 2020-12-21

## 2020-12-21 NOTE — TELEPHONE ENCOUNTER
Last Visit: 12/3/20 with MD Stacey Randle  Next Appointment: Advised to follow-up in 4 months  Previous Refill Encounter(s): 12/18/19 #90 with 3 refills    Requested Prescriptions     Pending Prescriptions Disp Refills    finasteride (PROSCAR) 5 mg tablet 90 Tab 3     Sig: take 1 tablet by mouth once daily

## 2021-02-01 RX ORDER — TAMSULOSIN HYDROCHLORIDE 0.4 MG/1
0.4 CAPSULE ORAL DAILY
Qty: 90 CAP | Refills: 3 | Status: SHIPPED | OUTPATIENT
Start: 2021-02-01

## 2021-02-01 NOTE — TELEPHONE ENCOUNTER
Last Visit: 12/3/20 with MD Darryle Paterson  Next Appointment: Advised to follow-up in 4 months  Previous Refill Encounter(s): 12/18/19 #90 with 3 refills    Requested Prescriptions     Pending Prescriptions Disp Refills    tamsulosin (FLOMAX) 0.4 mg capsule 90 Cap 3     Sig: Take 1 Cap by mouth daily.

## 2021-03-03 RX ORDER — ESOMEPRAZOLE MAGNESIUM 40 MG/1
40 CAPSULE, DELAYED RELEASE ORAL DAILY
Qty: 90 CAP | Refills: 3 | Status: SHIPPED | OUTPATIENT
Start: 2021-03-03

## 2021-03-03 NOTE — TELEPHONE ENCOUNTER
Last Visit: 12/3/20 with MD Villalba Score  Next Appointment: Advised to follow-up in 4 months  Previous Refill Encounter(s): 4/15/20 #90 with 3 refills    Requested Prescriptions     Pending Prescriptions Disp Refills    esomeprazole (NEXIUM) 40 mg capsule 90 Cap 3     Sig: Take 1 Cap by mouth daily.

## 2021-09-24 ENCOUNTER — IMPORTED ENCOUNTER (OUTPATIENT)
Dept: URBAN - NONMETROPOLITAN AREA CLINIC 1 | Facility: CLINIC | Age: 71
End: 2021-09-24

## 2021-09-24 PROCEDURE — 99213 OFFICE O/P EST LOW 20 MIN: CPT

## 2022-04-09 ASSESSMENT — VISUAL ACUITY
OU_SC: J2
OS_CC: 20/20
OU_CC: 20/20
OD_CC: 20/20
OS_CC: 20/20
OS_CC: 20/20
OD_CC: 20/20
OU_CC: 20/20
OU_CC: 20/20
OD_CC: 20/20

## 2022-04-09 ASSESSMENT — TONOMETRY
OS_IOP_MMHG: 12
OS_IOP_MMHG: 13
OD_IOP_MMHG: 13
OD_IOP_MMHG: 13
OD_IOP_MMHG: 12
OS_IOP_MMHG: 13

## 2022-12-09 ENCOUNTER — COMPREHENSIVE EXAM (OUTPATIENT)
Dept: RURAL CLINIC 1 | Facility: CLINIC | Age: 72
End: 2022-12-09

## 2022-12-09 PROCEDURE — 99213 OFFICE O/P EST LOW 20 MIN: CPT

## 2022-12-09 ASSESSMENT — VISUAL ACUITY
OS_SC: 20/20-1
OU_SC: 20/20-1
OU_SC: 20/30
OS_SC: 20/30
OD_SC: 20/30
OD_SC: 20/20

## 2022-12-09 ASSESSMENT — TONOMETRY
OS_IOP_MMHG: 13
OD_IOP_MMHG: 13

## 2022-12-09 NOTE — PATIENT DISCUSSION
DM c min   OD -Stressed the importance of keeping blood sugars under control blood pressure under control and weight normalization and regular visits with PCP. -Explained the possible effects of poorly controlled diabetes and the damage that diabetes can cause to ocular health. -Patient to check HgbA1C.-Pt instructed to contact our office with any vision changes. PCIOL OUMonitor for PCO Dermatochalasis UL OUBleph sx eval PRNBleph VF PRN; Dr's Notes: DFE 9/24/2021.

## 2024-11-07 ENCOUNTER — COMPREHENSIVE EXAM (OUTPATIENT)
Dept: RURAL CLINIC 1 | Facility: CLINIC | Age: 74
End: 2024-11-07

## 2024-11-07 DIAGNOSIS — Z96.1: ICD-10-CM

## 2024-11-07 DIAGNOSIS — E11.9: ICD-10-CM

## 2024-11-07 PROCEDURE — 92014 COMPRE OPH EXAM EST PT 1/>: CPT

## (undated) DEVICE — (D)SYR 10ML 1/5ML GRAD NSAF -- PKGING CHANGE USE ITEM 338027

## (undated) DEVICE — LUB SURG MEDC STRL 2OZ TUBE MC -- MEDICHOICE

## (undated) DEVICE — NITINOL STONE RETRIEVAL BASKET: Brand: ZERO TIP

## (undated) DEVICE — CATHETER URETH 20FR BLLN 5CC STD LTX HYDRGEL 2 W F BARDX

## (undated) DEVICE — BALLOON DILATATION CATHETER: Brand: UROMAX ULTRA

## (undated) DEVICE — FLEX ADVANTAGE 3000CC: Brand: FLEX ADVANTAGE

## (undated) DEVICE — BAG DRAINAGE CUST DISP

## (undated) DEVICE — GDWIRE URET STR STD .038X150 -- ZIPWIRE STD

## (undated) DEVICE — Z DISCONTINUED BY MEDLINE USE 2711682 TRAY SKIN PREP DRY W/ PREM GLV

## (undated) DEVICE — GOWN,PREVENTION PLUS,XLN/XL,ST,24/CS: Brand: MEDLINE

## (undated) DEVICE — (D)GLOVE SURG TRIFLX 8 PWD LTX -- DISC BY MFR USE ITEM 302994

## (undated) DEVICE — MEDI-VAC NON-CONDUCTIVE SUCTION TUBING: Brand: CARDINAL HEALTH

## (undated) DEVICE — KIT CLN UP BON SECOURS MARYV

## (undated) DEVICE — 3M™ BAIR PAWS FLEX™ WARMING GOWN, STANDARD, 20 PER CASE 81003: Brand: BAIR PAWS™

## (undated) DEVICE — URETERAL ACCESS SHEATH SET: Brand: NAVIGATOR

## (undated) DEVICE — URINARY LEG BAG COMBINATION PACK: Brand: HOLLISTER

## (undated) DEVICE — Z DUP USE 2565107 PACK SURG PROC LEG CYSTO T-DRAPE REINF TBL CVR HND TWL

## (undated) DEVICE — AIRLIFE™ ADULT CUSHION NASAL CANNULA 14 FOOT (4.3) CRUSH-RESISTANT OXYGEN TUBING, AND U/CONNECT-IT ADAPTER: Brand: AIRLIFE™

## (undated) DEVICE — GAUZE SPONGES,16 PLY: Brand: CURITY

## (undated) DEVICE — SOLUTION IRRIG 3000ML 0.9% SOD CHL FLX CONT 0797208] ICU MEDICAL INC]

## (undated) DEVICE — TUBING IRRIG L77IN DIA0.241IN L BOR FOR CYSTO W/ NVENT

## (undated) DEVICE — Device: Brand: MEDEX

## (undated) DEVICE — STER SINGLE BASIN SET W/BOWLS: Brand: CARDINAL HEALTH